# Patient Record
Sex: FEMALE | Race: WHITE | NOT HISPANIC OR LATINO
[De-identification: names, ages, dates, MRNs, and addresses within clinical notes are randomized per-mention and may not be internally consistent; named-entity substitution may affect disease eponyms.]

---

## 2018-08-03 ENCOUNTER — APPOINTMENT (OUTPATIENT)
Dept: ORTHOPEDIC SURGERY | Facility: CLINIC | Age: 83
End: 2018-08-03
Payer: MEDICARE

## 2018-08-03 VITALS — HEIGHT: 65 IN | RESPIRATION RATE: 14 BRPM | BODY MASS INDEX: 30.49 KG/M2 | WEIGHT: 183 LBS

## 2018-08-03 DIAGNOSIS — G56.01 CARPAL TUNNEL SYNDROME, RIGHT UPPER LIMB: ICD-10-CM

## 2018-08-03 PROCEDURE — 20605 DRAIN/INJ JOINT/BURSA W/O US: CPT | Mod: RT

## 2018-08-03 PROCEDURE — 99213 OFFICE O/P EST LOW 20 MIN: CPT | Mod: 25

## 2018-12-03 ENCOUNTER — APPOINTMENT (OUTPATIENT)
Dept: ORTHOPEDIC SURGERY | Facility: CLINIC | Age: 83
End: 2018-12-03
Payer: MEDICARE

## 2018-12-03 PROCEDURE — 73564 X-RAY EXAM KNEE 4 OR MORE: CPT | Mod: RT

## 2018-12-03 PROCEDURE — 20610 DRAIN/INJ JOINT/BURSA W/O US: CPT | Mod: RT

## 2018-12-03 PROCEDURE — 99214 OFFICE O/P EST MOD 30 MIN: CPT | Mod: 25

## 2019-02-19 ENCOUNTER — APPOINTMENT (OUTPATIENT)
Dept: ORTHOPEDIC SURGERY | Facility: CLINIC | Age: 84
End: 2019-02-19
Payer: MEDICARE

## 2019-02-19 VITALS — DIASTOLIC BLOOD PRESSURE: 75 MMHG | SYSTOLIC BLOOD PRESSURE: 181 MMHG | HEART RATE: 62 BPM

## 2019-02-19 VITALS — WEIGHT: 183 LBS | BODY MASS INDEX: 29.41 KG/M2 | HEIGHT: 66 IN

## 2019-02-19 DIAGNOSIS — M17.11 UNILATERAL PRIMARY OSTEOARTHRITIS, RIGHT KNEE: ICD-10-CM

## 2019-02-19 PROCEDURE — 20610 DRAIN/INJ JOINT/BURSA W/O US: CPT | Mod: RT

## 2019-02-19 PROCEDURE — 99214 OFFICE O/P EST MOD 30 MIN: CPT | Mod: 25

## 2019-02-19 RX ORDER — SIMVASTATIN 20 MG/1
20 TABLET, FILM COATED ORAL
Refills: 0 | Status: ACTIVE | COMMUNITY

## 2019-02-19 RX ORDER — OXYBUTYNIN CHLORIDE 5 MG/1
5 TABLET ORAL
Qty: 90 | Refills: 0 | Status: ACTIVE | COMMUNITY
Start: 2019-01-29

## 2019-02-19 RX ORDER — HYALURONATE SODIUM, STABILIZED 88 MG/4 ML
88 SYRINGE (ML) INTRAARTICULAR
Qty: 1 | Refills: 0 | Status: ACTIVE | OUTPATIENT
Start: 2019-02-19

## 2019-02-19 RX ORDER — TOLTERODINE TARTRATE 4 MG/1
4 CAPSULE, EXTENDED RELEASE ORAL
Qty: 30 | Refills: 0 | Status: ACTIVE | COMMUNITY
Start: 2018-08-28

## 2019-02-19 RX ORDER — APIXABAN 5 MG/1
5 TABLET, FILM COATED ORAL
Qty: 180 | Refills: 0 | Status: ACTIVE | COMMUNITY
Start: 2019-01-04

## 2019-02-19 RX ORDER — LOSARTAN POTASSIUM 100 MG/1
100 TABLET, FILM COATED ORAL
Qty: 90 | Refills: 0 | Status: ACTIVE | COMMUNITY
Start: 2019-01-28

## 2019-02-19 RX ORDER — AMLODIPINE BESYLATE 5 MG/1
5 TABLET ORAL
Qty: 90 | Refills: 0 | Status: ACTIVE | COMMUNITY
Start: 2019-01-26

## 2019-02-19 RX ORDER — PANTOPRAZOLE SODIUM 40 MG/10ML
INJECTION, POWDER, FOR SOLUTION INTRAVENOUS
Refills: 0 | Status: ACTIVE | COMMUNITY

## 2019-02-20 PROBLEM — M17.11 PRIMARY OSTEOARTHRITIS OF RIGHT KNEE: Status: ACTIVE | Noted: 2018-12-03

## 2019-06-21 ENCOUNTER — TRANSCRIPTION ENCOUNTER (OUTPATIENT)
Age: 84
End: 2019-06-21

## 2019-06-21 ENCOUNTER — INPATIENT (INPATIENT)
Facility: HOSPITAL | Age: 84
LOS: 21 days | Discharge: NURSING, REHAB OR LONG-TERM CARE | DRG: 469 | End: 2019-07-13
Attending: INTERNAL MEDICINE | Admitting: SPECIALIST
Payer: MEDICARE

## 2019-06-21 VITALS
RESPIRATION RATE: 16 BRPM | HEART RATE: 84 BPM | HEIGHT: 65 IN | OXYGEN SATURATION: 97 % | WEIGHT: 182.98 LBS | TEMPERATURE: 97 F | DIASTOLIC BLOOD PRESSURE: 74 MMHG | SYSTOLIC BLOOD PRESSURE: 149 MMHG

## 2019-06-21 DIAGNOSIS — Z90.49 ACQUIRED ABSENCE OF OTHER SPECIFIED PARTS OF DIGESTIVE TRACT: Chronic | ICD-10-CM

## 2019-06-21 LAB
ALBUMIN SERPL ELPH-MCNC: 4.3 G/DL — SIGNIFICANT CHANGE UP (ref 3.3–5)
ALP SERPL-CCNC: 150 U/L — HIGH (ref 40–120)
ALT FLD-CCNC: 15 U/L — SIGNIFICANT CHANGE UP (ref 10–45)
ANION GAP SERPL CALC-SCNC: 14 MMOL/L — SIGNIFICANT CHANGE UP (ref 5–17)
APTT BLD: 33.8 SEC — SIGNIFICANT CHANGE UP (ref 27.5–36.3)
AST SERPL-CCNC: 22 U/L — SIGNIFICANT CHANGE UP (ref 10–40)
BASOPHILS # BLD AUTO: 0.1 K/UL — SIGNIFICANT CHANGE UP (ref 0–0.2)
BASOPHILS NFR BLD AUTO: 1.1 % — SIGNIFICANT CHANGE UP (ref 0–2)
BILIRUB SERPL-MCNC: 0.3 MG/DL — SIGNIFICANT CHANGE UP (ref 0.2–1.2)
BLD GP AB SCN SERPL QL: NEGATIVE — SIGNIFICANT CHANGE UP
BUN SERPL-MCNC: 27 MG/DL — HIGH (ref 7–23)
CALCIUM SERPL-MCNC: 10 MG/DL — SIGNIFICANT CHANGE UP (ref 8.4–10.5)
CHLORIDE SERPL-SCNC: 102 MMOL/L — SIGNIFICANT CHANGE UP (ref 96–108)
CO2 SERPL-SCNC: 24 MMOL/L — SIGNIFICANT CHANGE UP (ref 22–31)
CREAT SERPL-MCNC: 1.46 MG/DL — HIGH (ref 0.5–1.3)
EOSINOPHIL # BLD AUTO: 0.2 K/UL — SIGNIFICANT CHANGE UP (ref 0–0.5)
EOSINOPHIL NFR BLD AUTO: 2.1 % — SIGNIFICANT CHANGE UP (ref 0–6)
GLUCOSE SERPL-MCNC: 128 MG/DL — HIGH (ref 70–99)
HCT VFR BLD CALC: 42.7 % — SIGNIFICANT CHANGE UP (ref 34.5–45)
HGB BLD-MCNC: 14.1 G/DL — SIGNIFICANT CHANGE UP (ref 11.5–15.5)
INR BLD: 1.08 RATIO — SIGNIFICANT CHANGE UP (ref 0.88–1.16)
LYMPHOCYTES # BLD AUTO: 1.9 K/UL — SIGNIFICANT CHANGE UP (ref 1–3.3)
LYMPHOCYTES # BLD AUTO: 24.1 % — SIGNIFICANT CHANGE UP (ref 13–44)
MCHC RBC-ENTMCNC: 30.8 PG — SIGNIFICANT CHANGE UP (ref 27–34)
MCHC RBC-ENTMCNC: 33 GM/DL — SIGNIFICANT CHANGE UP (ref 32–36)
MCV RBC AUTO: 93.3 FL — SIGNIFICANT CHANGE UP (ref 80–100)
MONOCYTES # BLD AUTO: 0.5 K/UL — SIGNIFICANT CHANGE UP (ref 0–0.9)
MONOCYTES NFR BLD AUTO: 6.9 % — SIGNIFICANT CHANGE UP (ref 2–14)
NEUTROPHILS # BLD AUTO: 5.2 K/UL — SIGNIFICANT CHANGE UP (ref 1.8–7.4)
NEUTROPHILS NFR BLD AUTO: 65.8 % — SIGNIFICANT CHANGE UP (ref 43–77)
PLATELET # BLD AUTO: 236 K/UL — SIGNIFICANT CHANGE UP (ref 150–400)
POTASSIUM SERPL-MCNC: 4.4 MMOL/L — SIGNIFICANT CHANGE UP (ref 3.5–5.3)
POTASSIUM SERPL-SCNC: 4.4 MMOL/L — SIGNIFICANT CHANGE UP (ref 3.5–5.3)
PROT SERPL-MCNC: 7.6 G/DL — SIGNIFICANT CHANGE UP (ref 6–8.3)
PROTHROM AB SERPL-ACNC: 12.3 SEC — SIGNIFICANT CHANGE UP (ref 10–12.9)
RBC # BLD: 4.57 M/UL — SIGNIFICANT CHANGE UP (ref 3.8–5.2)
RBC # FLD: 11.8 % — SIGNIFICANT CHANGE UP (ref 10.3–14.5)
RH IG SCN BLD-IMP: POSITIVE — SIGNIFICANT CHANGE UP
SODIUM SERPL-SCNC: 140 MMOL/L — SIGNIFICANT CHANGE UP (ref 135–145)
WBC # BLD: 7.9 K/UL — SIGNIFICANT CHANGE UP (ref 3.8–10.5)
WBC # FLD AUTO: 7.9 K/UL — SIGNIFICANT CHANGE UP (ref 3.8–10.5)

## 2019-06-21 PROCEDURE — 71045 X-RAY EXAM CHEST 1 VIEW: CPT | Mod: 26

## 2019-06-21 PROCEDURE — 73564 X-RAY EXAM KNEE 4 OR MORE: CPT | Mod: 26,RT

## 2019-06-21 PROCEDURE — 73503 X-RAY EXAM HIP UNI 4/> VIEWS: CPT | Mod: 26,RT

## 2019-06-21 PROCEDURE — 73552 X-RAY EXAM OF FEMUR 2/>: CPT | Mod: 26,RT

## 2019-06-21 PROCEDURE — 93010 ELECTROCARDIOGRAM REPORT: CPT

## 2019-06-21 PROCEDURE — 99285 EMERGENCY DEPT VISIT HI MDM: CPT | Mod: GC

## 2019-06-21 PROCEDURE — 73590 X-RAY EXAM OF LOWER LEG: CPT | Mod: 26,RT

## 2019-06-21 RX ORDER — MORPHINE SULFATE 50 MG/1
2 CAPSULE, EXTENDED RELEASE ORAL ONCE
Refills: 0 | Status: DISCONTINUED | OUTPATIENT
Start: 2019-06-21 | End: 2019-06-21

## 2019-06-21 RX ORDER — ONDANSETRON 8 MG/1
4 TABLET, FILM COATED ORAL ONCE
Refills: 0 | Status: COMPLETED | OUTPATIENT
Start: 2019-06-21 | End: 2019-06-21

## 2019-06-21 RX ADMIN — MORPHINE SULFATE 2 MILLIGRAM(S): 50 CAPSULE, EXTENDED RELEASE ORAL at 20:43

## 2019-06-21 RX ADMIN — MORPHINE SULFATE 2 MILLIGRAM(S): 50 CAPSULE, EXTENDED RELEASE ORAL at 23:04

## 2019-06-21 RX ADMIN — MORPHINE SULFATE 2 MILLIGRAM(S): 50 CAPSULE, EXTENDED RELEASE ORAL at 21:35

## 2019-06-21 NOTE — ED PROVIDER NOTE - NS ED ROS FT
GENERAL: No fever or chills  EYES: no change in vision  HEENT: no trouble swallowing or speaking  CARDIAC: no chest pain or palpitations   PULMONARY: no cough or SOB  GI: no abdominal pain, nausea, vomiting, diarrhea, or constipation   : No changes in urination  SKIN: no rashes  NEURO: no headache, numbness, or weakness  MSK: R hip/knee pain    ~Celso Huston PGY1

## 2019-06-21 NOTE — ED PROVIDER NOTE - PHYSICAL EXAMINATION
Gen: AAOx3, non-toxic  Head: NCAT  HEENT: EOMI, oral mucosa moist, normal conjunctiva  Lung: CTAB, no respiratory distress, no wheezes/rhonchi/rales B/L, speaking in full sentences  CV: +DP and PT pulses palpated bilaterally. RRR, no murmurs, rubs or gallops  Abd: soft, NTND, no guarding, no CVA tenderness  MSK: R leg shortened and externally rotated. TTP over R hip and R knee. no bony TTP anywhere else  Neuro: No focal sensory or motor deficits, normal CN exam   Skin: Warm, well perfused, no rash/bruising/laceration. R leg/foot warm and well perfused.   Psych: normal affect.     ~Celso Huston PGY1

## 2019-06-21 NOTE — ED ADULT NURSE NOTE - OBJECTIVE STATEMENT
brought in by [facility] from home s/p fall at at home from standing position; denies loc or headstrike;  has right hip pain brought in by [facility] from home s/p fall at at home from standing position; denies loc or headstrike;  on eliquis for afib; has right hip pain; hx polio; pt is alert and oriented x 4; skin warm and dry; right foot externally rotated; daughter at bedside

## 2019-06-21 NOTE — ED PROVIDER NOTE - OBJECTIVE STATEMENT
89F with PMH of HTN, HLD, and Afib on Eliquis p/w R hip and R knee pain s/p mechanical fall. States she was turning around in her kitchen when she accidentally tripped and fell onto her R side. Denies head trauma or LOC. Was able to call for help and get transported to the ED immediately. Now reporting pain to R hip and knee. Denies pain anywhere else. Bean any other symptoms including HA, visual changes, numbness, weakness, chest pain, SOB, abdominal pain, N/V/D.

## 2019-06-21 NOTE — ED PROVIDER NOTE - CLINICAL SUMMARY MEDICAL DECISION MAKING FREE TEXT BOX
R hip/knee pain s/p mechanical fall. No head trauma or LOC. Exam finding c/w hip fracture. Neurovascularly intact. Pt well appearing, HD stable. X-rays, pain control, and reassess. Patient presenting with R hip/knee pain s/p mechanical fall. No head trauma or LOC. Exam finding c/w hip fracture. Neurovascularly intact. Pt well appearing, HD stable. X-rays, pain control, and reassess.  ATTG: Dr. Bai

## 2019-06-22 ENCOUNTER — RESULT REVIEW (OUTPATIENT)
Age: 84
End: 2019-06-22

## 2019-06-22 DIAGNOSIS — S72.009A FRACTURE OF UNSPECIFIED PART OF NECK OF UNSPECIFIED FEMUR, INITIAL ENCOUNTER FOR CLOSED FRACTURE: ICD-10-CM

## 2019-06-22 LAB
ALBUMIN SERPL ELPH-MCNC: 3.9 G/DL — SIGNIFICANT CHANGE UP (ref 3.3–5)
ANION GAP SERPL CALC-SCNC: 11 MMOL/L — SIGNIFICANT CHANGE UP (ref 5–17)
ANION GAP SERPL CALC-SCNC: 15 MMOL/L — SIGNIFICANT CHANGE UP (ref 5–17)
APPEARANCE UR: CLEAR — SIGNIFICANT CHANGE UP
APTT BLD: 33.9 SEC — SIGNIFICANT CHANGE UP (ref 27.5–36.3)
BASOPHILS # BLD AUTO: 0 K/UL — SIGNIFICANT CHANGE UP (ref 0–0.2)
BASOPHILS NFR BLD AUTO: 0.3 % — SIGNIFICANT CHANGE UP (ref 0–2)
BILIRUB UR-MCNC: NEGATIVE — SIGNIFICANT CHANGE UP
BLD GP AB SCN SERPL QL: NEGATIVE — SIGNIFICANT CHANGE UP
BUN SERPL-MCNC: 24 MG/DL — HIGH (ref 7–23)
BUN SERPL-MCNC: 27 MG/DL — HIGH (ref 7–23)
CALCIUM SERPL-MCNC: 9.2 MG/DL — SIGNIFICANT CHANGE UP (ref 8.4–10.5)
CALCIUM SERPL-MCNC: 9.4 MG/DL — SIGNIFICANT CHANGE UP (ref 8.4–10.5)
CHLORIDE SERPL-SCNC: 103 MMOL/L — SIGNIFICANT CHANGE UP (ref 96–108)
CHLORIDE SERPL-SCNC: 107 MMOL/L — SIGNIFICANT CHANGE UP (ref 96–108)
CO2 SERPL-SCNC: 21 MMOL/L — LOW (ref 22–31)
CO2 SERPL-SCNC: 25 MMOL/L — SIGNIFICANT CHANGE UP (ref 22–31)
COLOR SPEC: YELLOW — SIGNIFICANT CHANGE UP
CREAT SERPL-MCNC: 1.41 MG/DL — HIGH (ref 0.5–1.3)
CREAT SERPL-MCNC: 1.48 MG/DL — HIGH (ref 0.5–1.3)
DIFF PNL FLD: NEGATIVE — SIGNIFICANT CHANGE UP
EOSINOPHIL # BLD AUTO: 0.1 K/UL — SIGNIFICANT CHANGE UP (ref 0–0.5)
EOSINOPHIL NFR BLD AUTO: 0.5 % — SIGNIFICANT CHANGE UP (ref 0–6)
GLUCOSE SERPL-MCNC: 134 MG/DL — HIGH (ref 70–99)
GLUCOSE SERPL-MCNC: 185 MG/DL — HIGH (ref 70–99)
GLUCOSE UR QL: NEGATIVE — SIGNIFICANT CHANGE UP
HCT VFR BLD CALC: 37.6 % — SIGNIFICANT CHANGE UP (ref 34.5–45)
HCT VFR BLD CALC: 37.8 % — SIGNIFICANT CHANGE UP (ref 34.5–45)
HGB BLD-MCNC: 12.8 G/DL — SIGNIFICANT CHANGE UP (ref 11.5–15.5)
HGB BLD-MCNC: 13 G/DL — SIGNIFICANT CHANGE UP (ref 11.5–15.5)
INR BLD: 1.14 RATIO — SIGNIFICANT CHANGE UP (ref 0.88–1.16)
KETONES UR-MCNC: NEGATIVE — SIGNIFICANT CHANGE UP
LEUKOCYTE ESTERASE UR-ACNC: ABNORMAL
LYMPHOCYTES # BLD AUTO: 1.1 K/UL — SIGNIFICANT CHANGE UP (ref 1–3.3)
LYMPHOCYTES # BLD AUTO: 9 % — LOW (ref 13–44)
MCHC RBC-ENTMCNC: 31.6 PG — SIGNIFICANT CHANGE UP (ref 27–34)
MCHC RBC-ENTMCNC: 32.5 PG — SIGNIFICANT CHANGE UP (ref 27–34)
MCHC RBC-ENTMCNC: 33.7 GM/DL — SIGNIFICANT CHANGE UP (ref 32–36)
MCHC RBC-ENTMCNC: 34.5 GM/DL — SIGNIFICANT CHANGE UP (ref 32–36)
MCV RBC AUTO: 93.7 FL — SIGNIFICANT CHANGE UP (ref 80–100)
MCV RBC AUTO: 94.1 FL — SIGNIFICANT CHANGE UP (ref 80–100)
MONOCYTES # BLD AUTO: 0.2 K/UL — SIGNIFICANT CHANGE UP (ref 0–0.9)
MONOCYTES NFR BLD AUTO: 1.7 % — LOW (ref 2–14)
NEUTROPHILS # BLD AUTO: 11.1 K/UL — HIGH (ref 1.8–7.4)
NEUTROPHILS NFR BLD AUTO: 88.5 % — HIGH (ref 43–77)
NITRITE UR-MCNC: NEGATIVE — SIGNIFICANT CHANGE UP
PH UR: 5.5 — SIGNIFICANT CHANGE UP (ref 5–8)
PLATELET # BLD AUTO: 202 K/UL — SIGNIFICANT CHANGE UP (ref 150–400)
PLATELET # BLD AUTO: 212 K/UL — SIGNIFICANT CHANGE UP (ref 150–400)
POTASSIUM SERPL-MCNC: 4.5 MMOL/L — SIGNIFICANT CHANGE UP (ref 3.5–5.3)
POTASSIUM SERPL-MCNC: 5 MMOL/L — SIGNIFICANT CHANGE UP (ref 3.5–5.3)
POTASSIUM SERPL-SCNC: 4.5 MMOL/L — SIGNIFICANT CHANGE UP (ref 3.5–5.3)
POTASSIUM SERPL-SCNC: 5 MMOL/L — SIGNIFICANT CHANGE UP (ref 3.5–5.3)
PROT UR-MCNC: SIGNIFICANT CHANGE UP
PROTHROM AB SERPL-ACNC: 13 SEC — HIGH (ref 10–12.9)
RBC # BLD: 3.99 M/UL — SIGNIFICANT CHANGE UP (ref 3.8–5.2)
RBC # BLD: 4.04 M/UL — SIGNIFICANT CHANGE UP (ref 3.8–5.2)
RBC # FLD: 11.7 % — SIGNIFICANT CHANGE UP (ref 10.3–14.5)
RBC # FLD: 11.8 % — SIGNIFICANT CHANGE UP (ref 10.3–14.5)
RH IG SCN BLD-IMP: POSITIVE — SIGNIFICANT CHANGE UP
SODIUM SERPL-SCNC: 139 MMOL/L — SIGNIFICANT CHANGE UP (ref 135–145)
SODIUM SERPL-SCNC: 143 MMOL/L — SIGNIFICANT CHANGE UP (ref 135–145)
SP GR SPEC: 1.02 — SIGNIFICANT CHANGE UP (ref 1.01–1.02)
UROBILINOGEN FLD QL: SIGNIFICANT CHANGE UP
WBC # BLD: 12.6 K/UL — HIGH (ref 3.8–10.5)
WBC # BLD: 9.6 K/UL — SIGNIFICANT CHANGE UP (ref 3.8–10.5)
WBC # FLD AUTO: 12.6 K/UL — HIGH (ref 3.8–10.5)
WBC # FLD AUTO: 9.6 K/UL — SIGNIFICANT CHANGE UP (ref 3.8–10.5)

## 2019-06-22 PROCEDURE — 73560 X-RAY EXAM OF KNEE 1 OR 2: CPT | Mod: 26,RT

## 2019-06-22 PROCEDURE — 72192 CT PELVIS W/O DYE: CPT | Mod: 26

## 2019-06-22 PROCEDURE — 27236 TREAT THIGH FRACTURE: CPT | Mod: RT

## 2019-06-22 PROCEDURE — 76377 3D RENDER W/INTRP POSTPROCES: CPT | Mod: 26

## 2019-06-22 PROCEDURE — 72170 X-RAY EXAM OF PELVIS: CPT | Mod: 26

## 2019-06-22 PROCEDURE — 88305 TISSUE EXAM BY PATHOLOGIST: CPT | Mod: 26

## 2019-06-22 PROCEDURE — 88311 DECALCIFY TISSUE: CPT | Mod: 26

## 2019-06-22 RX ORDER — SODIUM CHLORIDE 9 MG/ML
1000 INJECTION INTRAMUSCULAR; INTRAVENOUS; SUBCUTANEOUS
Refills: 0 | Status: DISCONTINUED | OUTPATIENT
Start: 2019-06-22 | End: 2019-06-22

## 2019-06-22 RX ORDER — SENNA PLUS 8.6 MG/1
2 TABLET ORAL AT BEDTIME
Refills: 0 | Status: DISCONTINUED | OUTPATIENT
Start: 2019-06-22 | End: 2019-07-13

## 2019-06-22 RX ORDER — MORPHINE SULFATE 50 MG/1
4 CAPSULE, EXTENDED RELEASE ORAL ONCE
Refills: 0 | Status: DISCONTINUED | OUTPATIENT
Start: 2019-06-22 | End: 2019-06-22

## 2019-06-22 RX ORDER — CEFAZOLIN SODIUM 1 G
2000 VIAL (EA) INJECTION EVERY 8 HOURS
Refills: 0 | Status: COMPLETED | OUTPATIENT
Start: 2019-06-23 | End: 2019-06-23

## 2019-06-22 RX ORDER — MORPHINE SULFATE 50 MG/1
2 CAPSULE, EXTENDED RELEASE ORAL EVERY 4 HOURS
Refills: 0 | Status: DISCONTINUED | OUTPATIENT
Start: 2019-06-22 | End: 2019-06-23

## 2019-06-22 RX ORDER — OXYCODONE HYDROCHLORIDE 5 MG/1
2.5 TABLET ORAL EVERY 4 HOURS
Refills: 0 | Status: DISCONTINUED | OUTPATIENT
Start: 2019-06-22 | End: 2019-06-23

## 2019-06-22 RX ORDER — HYDROMORPHONE HYDROCHLORIDE 2 MG/ML
0.2 INJECTION INTRAMUSCULAR; INTRAVENOUS; SUBCUTANEOUS
Refills: 0 | Status: DISCONTINUED | OUTPATIENT
Start: 2019-06-22 | End: 2019-06-22

## 2019-06-22 RX ORDER — MORPHINE SULFATE 50 MG/1
1 CAPSULE, EXTENDED RELEASE ORAL
Refills: 0 | Status: DISCONTINUED | OUTPATIENT
Start: 2019-06-22 | End: 2019-06-23

## 2019-06-22 RX ORDER — TRAMADOL HYDROCHLORIDE 50 MG/1
25 TABLET ORAL EVERY 6 HOURS
Refills: 0 | Status: DISCONTINUED | OUTPATIENT
Start: 2019-06-22 | End: 2019-06-22

## 2019-06-22 RX ORDER — SENNA PLUS 8.6 MG/1
2 TABLET ORAL AT BEDTIME
Refills: 0 | Status: DISCONTINUED | OUTPATIENT
Start: 2019-06-22 | End: 2019-06-22

## 2019-06-22 RX ORDER — MIRTAZAPINE 45 MG/1
15 TABLET, ORALLY DISINTEGRATING ORAL AT BEDTIME
Refills: 0 | Status: DISCONTINUED | OUTPATIENT
Start: 2019-06-22 | End: 2019-06-26

## 2019-06-22 RX ORDER — SIMVASTATIN 20 MG/1
20 TABLET, FILM COATED ORAL AT BEDTIME
Refills: 0 | Status: DISCONTINUED | OUTPATIENT
Start: 2019-06-22 | End: 2019-07-13

## 2019-06-22 RX ORDER — LOSARTAN POTASSIUM 100 MG/1
100 TABLET, FILM COATED ORAL DAILY
Refills: 0 | Status: DISCONTINUED | OUTPATIENT
Start: 2019-06-22 | End: 2019-06-24

## 2019-06-22 RX ORDER — ONDANSETRON 8 MG/1
4 TABLET, FILM COATED ORAL ONCE
Refills: 0 | Status: DISCONTINUED | OUTPATIENT
Start: 2019-06-22 | End: 2019-06-22

## 2019-06-22 RX ORDER — PANTOPRAZOLE SODIUM 20 MG/1
40 TABLET, DELAYED RELEASE ORAL
Refills: 0 | Status: DISCONTINUED | OUTPATIENT
Start: 2019-06-22 | End: 2019-06-22

## 2019-06-22 RX ORDER — OXYCODONE HYDROCHLORIDE 5 MG/1
2.5 TABLET ORAL EVERY 4 HOURS
Refills: 0 | Status: DISCONTINUED | OUTPATIENT
Start: 2019-06-22 | End: 2019-06-22

## 2019-06-22 RX ORDER — ONDANSETRON 8 MG/1
4 TABLET, FILM COATED ORAL EVERY 6 HOURS
Refills: 0 | Status: DISCONTINUED | OUTPATIENT
Start: 2019-06-22 | End: 2019-07-13

## 2019-06-22 RX ORDER — LOSARTAN POTASSIUM 100 MG/1
100 TABLET, FILM COATED ORAL DAILY
Refills: 0 | Status: DISCONTINUED | OUTPATIENT
Start: 2019-06-22 | End: 2019-06-22

## 2019-06-22 RX ORDER — MORPHINE SULFATE 50 MG/1
2 CAPSULE, EXTENDED RELEASE ORAL ONCE
Refills: 0 | Status: DISCONTINUED | OUTPATIENT
Start: 2019-06-22 | End: 2019-06-22

## 2019-06-22 RX ORDER — AMLODIPINE BESYLATE 2.5 MG/1
5 TABLET ORAL DAILY
Refills: 0 | Status: DISCONTINUED | OUTPATIENT
Start: 2019-06-22 | End: 2019-06-24

## 2019-06-22 RX ORDER — DOCUSATE SODIUM 100 MG
100 CAPSULE ORAL THREE TIMES A DAY
Refills: 0 | Status: DISCONTINUED | OUTPATIENT
Start: 2019-06-22 | End: 2019-07-13

## 2019-06-22 RX ORDER — PANTOPRAZOLE SODIUM 20 MG/1
40 TABLET, DELAYED RELEASE ORAL
Refills: 0 | Status: DISCONTINUED | OUTPATIENT
Start: 2019-06-22 | End: 2019-07-05

## 2019-06-22 RX ORDER — APIXABAN 2.5 MG/1
2.5 TABLET, FILM COATED ORAL EVERY 12 HOURS
Refills: 0 | Status: DISCONTINUED | OUTPATIENT
Start: 2019-06-23 | End: 2019-06-24

## 2019-06-22 RX ORDER — SIMVASTATIN 20 MG/1
20 TABLET, FILM COATED ORAL AT BEDTIME
Refills: 0 | Status: DISCONTINUED | OUTPATIENT
Start: 2019-06-22 | End: 2019-06-22

## 2019-06-22 RX ORDER — MIRTAZAPINE 45 MG/1
15 TABLET, ORALLY DISINTEGRATING ORAL AT BEDTIME
Refills: 0 | Status: DISCONTINUED | OUTPATIENT
Start: 2019-06-22 | End: 2019-06-22

## 2019-06-22 RX ORDER — OXYCODONE HYDROCHLORIDE 5 MG/1
5 TABLET ORAL EVERY 4 HOURS
Refills: 0 | Status: DISCONTINUED | OUTPATIENT
Start: 2019-06-22 | End: 2019-06-23

## 2019-06-22 RX ORDER — POLYETHYLENE GLYCOL 3350 17 G/17G
17 POWDER, FOR SOLUTION ORAL DAILY
Refills: 0 | Status: DISCONTINUED | OUTPATIENT
Start: 2019-06-22 | End: 2019-07-13

## 2019-06-22 RX ORDER — AMLODIPINE BESYLATE 2.5 MG/1
5 TABLET ORAL DAILY
Refills: 0 | Status: DISCONTINUED | OUTPATIENT
Start: 2019-06-22 | End: 2019-06-22

## 2019-06-22 RX ORDER — MAGNESIUM HYDROXIDE 400 MG/1
30 TABLET, CHEWABLE ORAL DAILY
Refills: 0 | Status: DISCONTINUED | OUTPATIENT
Start: 2019-06-22 | End: 2019-07-13

## 2019-06-22 RX ORDER — ACETAMINOPHEN 500 MG
975 TABLET ORAL EVERY 8 HOURS
Refills: 0 | Status: DISCONTINUED | OUTPATIENT
Start: 2019-06-22 | End: 2019-07-13

## 2019-06-22 RX ORDER — SODIUM CHLORIDE 9 MG/ML
1000 INJECTION INTRAMUSCULAR; INTRAVENOUS; SUBCUTANEOUS
Refills: 0 | Status: DISCONTINUED | OUTPATIENT
Start: 2019-06-22 | End: 2019-06-23

## 2019-06-22 RX ORDER — ACETAMINOPHEN 500 MG
975 TABLET ORAL EVERY 8 HOURS
Refills: 0 | Status: DISCONTINUED | OUTPATIENT
Start: 2019-06-22 | End: 2019-06-22

## 2019-06-22 RX ORDER — OXYCODONE HYDROCHLORIDE 5 MG/1
5 TABLET ORAL EVERY 4 HOURS
Refills: 0 | Status: DISCONTINUED | OUTPATIENT
Start: 2019-06-22 | End: 2019-06-22

## 2019-06-22 RX ORDER — DOCUSATE SODIUM 100 MG
100 CAPSULE ORAL THREE TIMES A DAY
Refills: 0 | Status: DISCONTINUED | OUTPATIENT
Start: 2019-06-22 | End: 2019-06-22

## 2019-06-22 RX ADMIN — SODIUM CHLORIDE 125 MILLILITER(S): 9 INJECTION INTRAMUSCULAR; INTRAVENOUS; SUBCUTANEOUS at 08:15

## 2019-06-22 RX ADMIN — PANTOPRAZOLE SODIUM 40 MILLIGRAM(S): 20 TABLET, DELAYED RELEASE ORAL at 06:48

## 2019-06-22 RX ADMIN — MORPHINE SULFATE 2 MILLIGRAM(S): 50 CAPSULE, EXTENDED RELEASE ORAL at 01:49

## 2019-06-22 RX ADMIN — MORPHINE SULFATE 2 MILLIGRAM(S): 50 CAPSULE, EXTENDED RELEASE ORAL at 03:30

## 2019-06-22 RX ADMIN — Medication 975 MILLIGRAM(S): at 13:46

## 2019-06-22 RX ADMIN — Medication 975 MILLIGRAM(S): at 07:20

## 2019-06-22 RX ADMIN — ONDANSETRON 4 MILLIGRAM(S): 8 TABLET, FILM COATED ORAL at 00:02

## 2019-06-22 RX ADMIN — OXYCODONE HYDROCHLORIDE 5 MILLIGRAM(S): 5 TABLET ORAL at 06:48

## 2019-06-22 RX ADMIN — Medication 975 MILLIGRAM(S): at 06:48

## 2019-06-22 RX ADMIN — SODIUM CHLORIDE 125 MILLILITER(S): 9 INJECTION INTRAMUSCULAR; INTRAVENOUS; SUBCUTANEOUS at 22:23

## 2019-06-22 RX ADMIN — LOSARTAN POTASSIUM 100 MILLIGRAM(S): 100 TABLET, FILM COATED ORAL at 06:49

## 2019-06-22 RX ADMIN — MORPHINE SULFATE 4 MILLIGRAM(S): 50 CAPSULE, EXTENDED RELEASE ORAL at 03:35

## 2019-06-22 RX ADMIN — AMLODIPINE BESYLATE 5 MILLIGRAM(S): 2.5 TABLET ORAL at 11:12

## 2019-06-22 RX ADMIN — Medication 975 MILLIGRAM(S): at 12:46

## 2019-06-22 RX ADMIN — OXYCODONE HYDROCHLORIDE 5 MILLIGRAM(S): 5 TABLET ORAL at 07:20

## 2019-06-22 NOTE — H&P ADULT - NSICDXPASTMEDICALHX_GEN_ALL_CORE_FT
PAST MEDICAL HISTORY:  High cholesterol     Hypertension     Pleural effusion on left     Polio     Splenic abscess

## 2019-06-22 NOTE — H&P ADULT - HISTORY OF PRESENT ILLNESS
89y Female presents to [facility] ED s/p mech fall in the kitchen onto R hip when right leg gave way, now c/o severe right hip pain and inability to ambulate.  Patient denies headstrike or LOC. Localizes pain to right hip/femur/knee. Patient denies radiation of pain. Patient denies numbness/tingling/burning in the RLE. No other bone/joint complaints. Patient is a community ambulator at baseline with a rolling walker. Patient has no issues w/ ADLs/IADLs.               Imaging:  XR demonstrating _ femoral neck/intertroch/subtroch fracture 89y Female presents to [facility] ED s/p mech fall in the kitchen onto R hip when right leg gave way, now c/o severe right hip pain and inability to ambulate.  Patient denies headstrike or LOC. Localizes pain to right hip/femur/knee. Patient denies radiation of pain. Patient denies numbness/tingling/burning in the RLE. No other bone/joint complaints. Patient is a community ambulator at baseline with a rolling walker. Patient has no issues w/ ADLs/IADLs.

## 2019-06-22 NOTE — H&P ADULT - ASSESSMENT
89y Female with right femoral neck fracture    - admit to orthopedic surgery service under Dr. Muhammad  - Pain control  - NPO/IVF  - CBC/BMP/Coags/UA/T+S x2  - EKG/CXR  - Medical clearance  - Plan for OR for right hip hemiarthroplasty

## 2019-06-22 NOTE — H&P ADULT - NSHPLABSRESULTS_GEN_ALL_CORE
Labs:                          14.1   7.9   )-----------( 236      ( 21 Jun 2019 20:53 )             42.7   06-21    140  |  102  |  27<H>  ----------------------------<  128<H>  4.4   |  24  |  1.46<H>    Ca    10.0      21 Jun 2019 20:53    TPro  7.6  /  Alb  4.3  /  TBili  0.3  /  DBili  x   /  AST  22  /  ALT  15  /  AlkPhos  150<H>  06-21  PT/INR - ( 21 Jun 2019 20:53 )   PT: 12.3 sec;   INR: 1.08 ratio         PTT - ( 21 Jun 2019 20:53 )  PTT:33.8 sec    Imaging:    < from: CT Pelvis Bony Only No Cont (06.22.19 @ 01:15) >    INTERPRETATION:  Acute right subcapital femoral fracture with anterior   displacement of the femoral neck with respect to the femoral head.    Discussed with Dr. Falcon at 2:19 AM      ******PRELIMINARY REPORT******    ******PRELIMINARY REPORT******          < end of copied text >      < from: CT 3D Reconstruct w/ Workstation (06.22.19 @ 01:15) >      INTERPRETATION:  Acute right subcapital femoral fracture with anterior   displacement of the femoral neck with respect to the femoral head.    Discussed with Dr. Falcon at 2:19 AM    < end of copied text >    Preliminary report

## 2019-06-22 NOTE — PRE-ANESTHESIA EVALUATION ADULT - NSANTHOSAYNRD_GEN_A_CORE
No. SANYA screening performed.  STOP BANG Legend: 0-2 = LOW Risk; 3-4 = INTERMEDIATE Risk; 5-8 = HIGH Risk

## 2019-06-22 NOTE — PRE-ANESTHESIA EVALUATION ADULT - NSANTHPMHFT_GEN_ALL_CORE
medical clearance: accidental fall with a right subcapital femoral neck fracture that requires orthopedic ORIF. Her comorbidities include paroxysmal atrial fibrillation now in NSR, anticoagulation with eliquis last dose 36 hours ago,  controlled hypertension, osteoporiosis, DJD of both knees, urinary incontinence and advanced age. Exam, lab, EKG and CXR are stable. The patient is medically stable, medically optimized and has no medical contraindication to surgery later today as required.  TTE 2016: EF 65%, 1) mild MR, min TR. Normal left ventricular internal dimensions and wall thicknesses. 2. Normal left ventricular systolic function. No segmental wall motion abnormalities. 3. Mild diastolic dysfunction (Stage I). 4. Normal right ventricular size and function. 5. Agitated saline injection demonstrates no evidence of a patent foramen ovale.  KATHRYN 9/2016: Conclusions:  1. Mitral annular calcification, otherwise normal mitral  valve. No vegetations seen.  2. Calcified trileaflet aortic valve with normal opening.  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  3. Moderate atheroma noted in aortic arch/descending aorta.  4. No left atrial or left atrial appendage thrombus seen.  5. Grossly normal left ventricular systolic function.  6. Grossly normal tricuspid valve. Mild tricuspid  regurgitation.  7. Grossly normal pulmonic valve. Minimal pulmonic  regurgitation.  8. Contrast injection demonstrates no evidence of a patent  foramen ovale.  9. Small pericardial effusion. medical clearance: accidental fall with a right subcapital femoral neck fracture that requires orthopedic ORIF. Her comorbidities include paroxysmal atrial fibrillation now in NSR, anticoagulation with eliquis last dose 36 hours ago,  controlled hypertension, osteoporiosis, DJD of both knees, urinary incontinence and advanced age. Exam, lab, EKG and CXR are stable. The patient is medically stable, medically optimized and has no medical contraindication to surgery later today as required; shoulder surgery; splenic drain for abscess  TTE 2016: EF 65%, 1) mild MR, min TR. Normal left ventricular internal dimensions and wall thicknesses. 2. Normal left ventricular systolic function. No segmental wall motion abnormalities. 3. Mild diastolic dysfunction (Stage I). 4. Normal right ventricular size and function. 5. Agitated saline injection demonstrates no evidence of a patent foramen ovale.  KATHRYN 9/2016: Conclusions:  1. Mitral annular calcification, otherwise normal mitral  valve. No vegetations seen.  2. Calcified trileaflet aortic valve with normal opening.  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  3. Moderate atheroma noted in aortic arch/descending aorta.  4. No left atrial or left atrial appendage thrombus seen.  5. Grossly normal left ventricular systolic function.  6. Grossly normal tricuspid valve. Mild tricuspid  regurgitation.  7. Grossly normal pulmonic valve. Minimal pulmonic  regurgitation.  8. Contrast injection demonstrates no evidence of a patent  foramen ovale.  9. Small pericardial effusion.

## 2019-06-22 NOTE — H&P ADULT - NSHPPHYSICALEXAM_GEN_ALL_CORE
PE   RLE:  Skin intact; No ecchymosis/soft tissue swelling  foot with external rotation deformity, per patient it is chronic as a result of childhood polio  Compartments soft; + TTP about hip. No TTP to leg/ankle/foot, mild TTP about knee  ROM lmited 2/2 pain  Unable to SLR; + Log Roll/Heel Strike  Motor intact GS/TA/FHL/EHL, weak 2/2 childhood polio  SILT L2-S1  WWP distally    LLE/BUE:   No bony TTP; Good ROM w/o pain; Exam Unremarkable

## 2019-06-22 NOTE — CONSULT NOTE ADULT - SUBJECTIVE AND OBJECTIVE BOX
The patient was seen and examined today after an accidental fall with a right subcapital femoral neck fracture that requires orthopedic ORIF. Her comorbidities include paroxysmal atrial fibrillation now in NSR, anticoagulation with eliquis last dose 36 hours ago,  controlled hypertension, osteoporiosis, DJD of both knessurinary incontenence and advanced age. Exam, lab, EKG and CXR are stable. The patient is medically stable, medically optimized and has no medical contraindication to surgery later today as required. Exam time 70 minutes including > than 50 % for bedside discussion and counseling. Comprehensive consultation dictated #[id number]		.

## 2019-06-22 NOTE — CHART NOTE - NSCHARTNOTEFT_GEN_A_CORE
POST-OPERATIVE NOTE    Subjective:  Patient is s/p R hip elia. Recovering appropriately.     Vital Signs Last 24 Hrs  T(C): 36 (2019 22:00), Max: 37.2 (2019 05:29)  T(F): 96.8 (2019 22:00), Max: 98.9 (2019 05:29)  HR: 77 (2019 22:30) (64 - 92)  BP: 108/52 (2019 22:30) (107/52 - 152/76)  BP(mean): 75 (2019 22:30) (75 - 93)  RR: 16 (2019 22:30) (16 - 18)  SpO2: 97% (2019 22:30) (91% - 98%)  I&O's Detail    2019 07:01  -  2019 22:48  --------------------------------------------------------  IN:    sodium chloride 0.9%: 1500 mL  Total IN: 1500 mL    OUT:    Incontinent per Collection Ba mL  Total OUT: 600 mL    Total NET: 900 mL        amLODIPine   Tablet 5  losartan 100    PAST MEDICAL & SURGICAL HISTORY:  Pleural effusion on left  Splenic abscess  Polio  High cholesterol  Hypertension  History of appendectomy        Physical Exam:  General: NAD, resting comfortably in bed  Pulmonary: Nonlabored breathing, no respiratory distress  Gen: awake, alert, NAD  Resp: no increased work of breathing  RLE:  dressing c/d/i  motor intact however weak 2/2 childhood polio  SILT L3-S1  +DP/PT Pulses  Compartments soft  No calf TTP       LABS:                        12.8   9.6   )-----------( 202      ( 2019 05:58 )             37.8     -    139  |  103  |  27<H>  ----------------------------<  134<H>  5.0   |  25  |  1.48<H>    Ca    9.4      2019 05:58    TPro  x   /  Alb  3.9  /  TBili  x   /  DBili  x   /  AST  x   /  ALT  x   /  AlkPhos  x       PT/INR - ( 2019 05:58 )   PT: 13.0 sec;   INR: 1.14 ratio         PTT - ( 2019 05:58 )  PTT:33.9 sec  CAPILLARY BLOOD GLUCOSE      Assessment:  The patient is a 89y Female who is now several hours post-op from a R hip hemiarthroplasty    Plan:  - Pain control as needed  - DVT ppx  - OOB and ambulating as tolerated  - WBAT  - PT/OT  - F/u AM labs

## 2019-06-23 LAB
ANION GAP SERPL CALC-SCNC: 12 MMOL/L — SIGNIFICANT CHANGE UP (ref 5–17)
BASOPHILS # BLD AUTO: 0.03 K/UL — SIGNIFICANT CHANGE UP (ref 0–0.2)
BASOPHILS NFR BLD AUTO: 0.4 % — SIGNIFICANT CHANGE UP (ref 0–2)
BUN SERPL-MCNC: 24 MG/DL — HIGH (ref 7–23)
CALCIUM SERPL-MCNC: 8.8 MG/DL — SIGNIFICANT CHANGE UP (ref 8.4–10.5)
CHLORIDE SERPL-SCNC: 107 MMOL/L — SIGNIFICANT CHANGE UP (ref 96–108)
CO2 SERPL-SCNC: 21 MMOL/L — LOW (ref 22–31)
CREAT SERPL-MCNC: 1.41 MG/DL — HIGH (ref 0.5–1.3)
EOSINOPHIL # BLD AUTO: 0.16 K/UL — SIGNIFICANT CHANGE UP (ref 0–0.5)
EOSINOPHIL NFR BLD AUTO: 1.9 % — SIGNIFICANT CHANGE UP (ref 0–6)
GLUCOSE SERPL-MCNC: 168 MG/DL — HIGH (ref 70–99)
HCT VFR BLD CALC: 34.7 % — SIGNIFICANT CHANGE UP (ref 34.5–45)
HGB BLD-MCNC: 10.7 G/DL — LOW (ref 11.5–15.5)
IMM GRANULOCYTES NFR BLD AUTO: 0.4 % — SIGNIFICANT CHANGE UP (ref 0–1.5)
LYMPHOCYTES # BLD AUTO: 0.8 K/UL — LOW (ref 1–3.3)
LYMPHOCYTES # BLD AUTO: 9.4 % — LOW (ref 13–44)
MCHC RBC-ENTMCNC: 30.2 PG — SIGNIFICANT CHANGE UP (ref 27–34)
MCHC RBC-ENTMCNC: 30.8 GM/DL — LOW (ref 32–36)
MCV RBC AUTO: 98 FL — SIGNIFICANT CHANGE UP (ref 80–100)
MONOCYTES # BLD AUTO: 0.32 K/UL — SIGNIFICANT CHANGE UP (ref 0–0.9)
MONOCYTES NFR BLD AUTO: 3.8 % — SIGNIFICANT CHANGE UP (ref 2–14)
NEUTROPHILS # BLD AUTO: 7.16 K/UL — SIGNIFICANT CHANGE UP (ref 1.8–7.4)
NEUTROPHILS NFR BLD AUTO: 84.1 % — HIGH (ref 43–77)
PLATELET # BLD AUTO: 170 K/UL — SIGNIFICANT CHANGE UP (ref 150–400)
POTASSIUM SERPL-MCNC: 4.8 MMOL/L — SIGNIFICANT CHANGE UP (ref 3.5–5.3)
POTASSIUM SERPL-SCNC: 4.8 MMOL/L — SIGNIFICANT CHANGE UP (ref 3.5–5.3)
RBC # BLD: 3.54 M/UL — LOW (ref 3.8–5.2)
RBC # FLD: 13 % — SIGNIFICANT CHANGE UP (ref 10.3–14.5)
SODIUM SERPL-SCNC: 140 MMOL/L — SIGNIFICANT CHANGE UP (ref 135–145)
WBC # BLD: 8.5 K/UL — SIGNIFICANT CHANGE UP (ref 3.8–10.5)
WBC # FLD AUTO: 8.5 K/UL — SIGNIFICANT CHANGE UP (ref 3.8–10.5)

## 2019-06-23 RX ORDER — LIDOCAINE 4 G/100G
1 CREAM TOPICAL EVERY 24 HOURS
Refills: 0 | Status: DISCONTINUED | OUTPATIENT
Start: 2019-06-23 | End: 2019-07-13

## 2019-06-23 RX ORDER — TRAMADOL HYDROCHLORIDE 50 MG/1
25 TABLET ORAL EVERY 4 HOURS
Refills: 0 | Status: DISCONTINUED | OUTPATIENT
Start: 2019-06-23 | End: 2019-06-30

## 2019-06-23 RX ORDER — TRAMADOL HYDROCHLORIDE 50 MG/1
50 TABLET ORAL EVERY 4 HOURS
Refills: 0 | Status: DISCONTINUED | OUTPATIENT
Start: 2019-06-23 | End: 2019-06-30

## 2019-06-23 RX ORDER — OXYBUTYNIN CHLORIDE 5 MG
5 TABLET ORAL DAILY
Refills: 0 | Status: DISCONTINUED | OUTPATIENT
Start: 2019-06-23 | End: 2019-07-13

## 2019-06-23 RX ADMIN — Medication 975 MILLIGRAM(S): at 21:45

## 2019-06-23 RX ADMIN — SENNA PLUS 2 TABLET(S): 8.6 TABLET ORAL at 21:14

## 2019-06-23 RX ADMIN — Medication 100 MILLIGRAM(S): at 13:34

## 2019-06-23 RX ADMIN — APIXABAN 2.5 MILLIGRAM(S): 2.5 TABLET, FILM COATED ORAL at 06:36

## 2019-06-23 RX ADMIN — Medication 975 MILLIGRAM(S): at 13:33

## 2019-06-23 RX ADMIN — TRAMADOL HYDROCHLORIDE 25 MILLIGRAM(S): 50 TABLET ORAL at 11:50

## 2019-06-23 RX ADMIN — Medication 100 MILLIGRAM(S): at 03:03

## 2019-06-23 RX ADMIN — LOSARTAN POTASSIUM 100 MILLIGRAM(S): 100 TABLET, FILM COATED ORAL at 06:40

## 2019-06-23 RX ADMIN — TRAMADOL HYDROCHLORIDE 25 MILLIGRAM(S): 50 TABLET ORAL at 21:45

## 2019-06-23 RX ADMIN — Medication 100 MILLIGRAM(S): at 10:24

## 2019-06-23 RX ADMIN — TRAMADOL HYDROCHLORIDE 25 MILLIGRAM(S): 50 TABLET ORAL at 21:14

## 2019-06-23 RX ADMIN — LIDOCAINE 1 PATCH: 4 CREAM TOPICAL at 12:03

## 2019-06-23 RX ADMIN — Medication 975 MILLIGRAM(S): at 07:10

## 2019-06-23 RX ADMIN — TRAMADOL HYDROCHLORIDE 25 MILLIGRAM(S): 50 TABLET ORAL at 12:25

## 2019-06-23 RX ADMIN — SIMVASTATIN 20 MILLIGRAM(S): 20 TABLET, FILM COATED ORAL at 21:15

## 2019-06-23 RX ADMIN — Medication 975 MILLIGRAM(S): at 14:00

## 2019-06-23 RX ADMIN — Medication 975 MILLIGRAM(S): at 06:40

## 2019-06-23 RX ADMIN — Medication 975 MILLIGRAM(S): at 21:14

## 2019-06-23 RX ADMIN — POLYETHYLENE GLYCOL 3350 17 GRAM(S): 17 POWDER, FOR SOLUTION ORAL at 11:55

## 2019-06-23 RX ADMIN — PANTOPRAZOLE SODIUM 40 MILLIGRAM(S): 20 TABLET, DELAYED RELEASE ORAL at 06:40

## 2019-06-23 RX ADMIN — Medication 100 MILLIGRAM(S): at 06:40

## 2019-06-23 RX ADMIN — APIXABAN 2.5 MILLIGRAM(S): 2.5 TABLET, FILM COATED ORAL at 18:02

## 2019-06-23 RX ADMIN — Medication 100 MILLIGRAM(S): at 21:14

## 2019-06-23 RX ADMIN — LIDOCAINE 1 PATCH: 4 CREAM TOPICAL at 20:10

## 2019-06-23 RX ADMIN — Medication 5 MILLIGRAM(S): at 11:52

## 2019-06-23 RX ADMIN — AMLODIPINE BESYLATE 5 MILLIGRAM(S): 2.5 TABLET ORAL at 06:40

## 2019-06-23 NOTE — PROGRESS NOTE ADULT - SUBJECTIVE AND OBJECTIVE BOX
Patient resting without complaints.  No chest pain, SOB, N/V.    T(C): 36.9 (06-23-19 @ 05:28), Max: 36.9 (06-22-19 @ 08:02)  HR: 81 (06-23-19 @ 05:28) (64 - 92)  BP: 132/58 (06-23-19 @ 05:28) (105/56 - 148/68)  RR: 18 (06-23-19 @ 05:28) (16 - 18)  SpO2: 94% (06-23-19 @ 05:28) (93% - 98%)      Exam:  Alert and Oriented, No Acute Distress  Lower Extremities:  RLE: Aquacel Dressing C/D/I, Dull sensation grossly intact, No hematoma appreciated, Abduction pillow in place,  Toes warm and mobile,  Calves soft, NT                            13.0   12.6  )-----------( 212      ( 22 Jun 2019 22:54 )             37.6    06-22    143  |  107  |  24<H>  ----------------------------<  185<H>  4.5   |  21<L>  |  1.41<H>    Ca    9.2      22 Jun 2019 22:54    TPro  x   /  Alb  3.9  /  TBili  x   /  DBili  x   /  AST  x   /  ALT  x   /  AlkPhos  x   06-22

## 2019-06-23 NOTE — PROGRESS NOTE ADULT - ASSESSMENT
The patient underwent a right hemiarthroplasty ORIF surgery on 6/22/2019. The patient is now postop, feeling well and complaining of moderate right hip incisional pain.  She is anxious to be out of bed to chair and begin ambulation, which will be performed tomorrow morning.   The patient has no new medical complaints at this time.

## 2019-06-23 NOTE — PROGRESS NOTE ADULT - SUBJECTIVE AND OBJECTIVE BOX
Patient is a 89y old  Female who presents with a chief complaint of hip fracture (2019 07:36)        MEDICATIONS  (STANDING):  acetaminophen   Tablet .. 975 milliGRAM(s) Oral every 8 hours  amLODIPine   Tablet 5 milliGRAM(s) Oral daily  apixaban 2.5 milliGRAM(s) Oral every 12 hours  ceFAZolin   IVPB 2000 milliGRAM(s) IV Intermittent every 8 hours  docusate sodium 100 milliGRAM(s) Oral three times a day  losartan 100 milliGRAM(s) Oral daily  pantoprazole    Tablet 40 milliGRAM(s) Oral before breakfast  polyethylene glycol 3350 17 Gram(s) Oral daily  senna 2 Tablet(s) Oral at bedtime  simvastatin 20 milliGRAM(s) Oral at bedtime  sodium chloride 0.9%. 1000 milliLiter(s) (125 mL/Hr) IV Continuous <Continuous>    MEDICATIONS  (PRN):  magnesium hydroxide Suspension 30 milliLiter(s) Oral daily PRN Constipation  mirtazapine 15 milliGRAM(s) Oral at bedtime PRN Insomnia  morphine  - Injectable 1 milliGRAM(s) IV Push every 2 hours PRN Severe Pain (7 - 10)  morphine  - Injectable 2 milliGRAM(s) IV Push every 4 hours PRN severe breakthrough pain  ondansetron Injectable 4 milliGRAM(s) IV Push every 6 hours PRN Nausea and/or Vomiting  oxyCODONE    IR 2.5 milliGRAM(s) Oral every 4 hours PRN Moderate Pain (4 - 6)  oxyCODONE    IR 5 milliGRAM(s) Oral every 4 hours PRN Severe Pain (7 - 10)      Allergies    No Known Allergies    Intolerances      VITALS:   T(C): 37.4 (19 @ 08:40), Max: 37.4 (19 @ 08:40)  HR: 75 (19 @ 08:40) (64 - 92)  BP: 108/48 (19 @ 08:40) (105/56 - 148/68)  RR: 18 (19 @ 08:40) (16 - 18)  SpO2: 95% (19 @ 08:40) (93% - 98%)  Wt(kg): --    PHYSICAL EXAM:  GENERAL: NAD, well nourished and conversant  HEAD:  Atraumatic  EYES: EOM, PERRLA, conjunctiva pink and sclera white  ENT: No tonsillar erythema, exudates, or enlargement, moist mucous membranes, good dentition, no lesions  NECK: Supple, No JVD, normal thyroid, carotids with normal upstrokes and no bruits  CHEST/LUNG: Clear to auscultation bilaterally, No rales, rhonchi, wheezing, or rubs  HEART: Regular rate and rhythm, No murmurs, rubs, or gallops  ABDOMEN: Soft, nondistended, no masses, guarding, tenderness or rebound, bowel sounds present  EXTREMITIES:  2+ Peripheral Pulses, No clubbing, cyanosis, or edema. No arthritis of shoulders, elbows, hands, hips, knees, ankles, or feet. No DJD C spine, T spine, or L/S spine  LYMPH: No lymphadenopathy noted  SKIN: No rashes or lesions  NERVOUS SYSTEM:  Alert & Oriented X3, normal cognitive function. Motor Strength 5/5 right upper and right lower.  5/5 left upper and left lower extremities, DTRs 2+ intact and symmetric    LABS:        CBC Full  -  ( 2019 08:46 )  WBC Count : 8.50 K/uL  RBC Count : 3.54 M/uL  Hemoglobin : 10.7 g/dL  Hematocrit : 34.7 %  Platelet Count - Automated : 170 K/uL  Mean Cell Volume : 98.0 fl  Mean Cell Hemoglobin : 30.2 pg  Mean Cell Hemoglobin Concentration : 30.8 gm/dL  Auto Neutrophil # : 7.16 K/uL  Auto Lymphocyte # : 0.80 K/uL  Auto Monocyte # : 0.32 K/uL  Auto Eosinophil # : 0.16 K/uL  Auto Basophil # : 0.03 K/uL  Auto Neutrophil % : 84.1 %  Auto Lymphocyte % : 9.4 %  Auto Monocyte % : 3.8 %  Auto Eosinophil % : 1.9 %  Auto Basophil % : 0.4 %    -    140  |  107  |  24<H>  ----------------------------<  168<H>  4.8   |  21<L>  |  1.41<H>    Ca    8.8      2019 07:00    TPro  x   /  Alb  3.9  /  TBili  x   /  DBili  x   /  AST  x   /  ALT  x   /  AlkPhos  x   -22    LIVER FUNCTIONS - ( 2019 05:58 )  Alb: 3.9 g/dL / Pro: x     / ALK PHOS: x     / ALT: x     / AST: x     / GGT: x           PT/INR - ( 2019 05:58 )   PT: 13.0 sec;   INR: 1.14 ratio         PTT - ( 2019 05:58 )  PTT:33.9 sec  Urinalysis Basic - ( 2019 14:54 )    Color: Yellow / Appearance: Clear / S.021 / pH: x  Gluc: x / Ketone: Negative  / Bili: Negative / Urobili: <2 mg/dL   Blood: x / Protein: Trace / Nitrite: Negative   Leuk Esterase: Moderate / RBC: 2 /HPF / WBC 6 /HPF   Sq Epi: x / Non Sq Epi: 3 /HPF / Bacteria: Negative      CAPILLARY BLOOD GLUCOSE          RADIOLOGY & ADDITIONAL TESTS:      Consultant(s):    Care Discussed with Consultants/Other Providers [ ] YES  [ ] NO Patient is a 89y old  Female who presents with a chief complaint of hip fracture.  s/p mech fall in the kitchen onto R hip when right leg gave way, now c/o severe right hip pain and inability to ambulate.  Patient denies headstrike or LOC. Localizes pain to right hip/femur/knee. Patient denies radiation of pain. Patient denies numbness/tingling/burning in the RLE. No other bone/joint complaints. Patient is a community ambulator at baseline with a rolling walker. Patient has no issues w/ ADLs/IADLs. The patient underwent a right hemiarthroplasty ORIF surgery on 2019. The patient is now postop, feeling well and complaining of moderate right hip incisional pain.  She is anxious to be out of bed to chair and begin ambulation, which will be performed tomorrow morning.   The patient has no new medical complaints at this time.        MEDICATIONS  (STANDING):  acetaminophen   Tablet .. 975 milliGRAM(s) Oral every 8 hours  amLODIPine   Tablet 5 milliGRAM(s) Oral daily  apixaban 2.5 milliGRAM(s) Oral every 12 hours  ceFAZolin   IVPB 2000 milliGRAM(s) IV Intermittent every 8 hours  docusate sodium 100 milliGRAM(s) Oral three times a day  losartan 100 milliGRAM(s) Oral daily  pantoprazole    Tablet 40 milliGRAM(s) Oral before breakfast  polyethylene glycol 3350 17 Gram(s) Oral daily  senna 2 Tablet(s) Oral at bedtime  simvastatin 20 milliGRAM(s) Oral at bedtime  sodium chloride 0.9%. 1000 milliLiter(s) (125 mL/Hr) IV Continuous <Continuous>    MEDICATIONS  (PRN):  magnesium hydroxide Suspension 30 milliLiter(s) Oral daily PRN Constipation  mirtazapine 15 milliGRAM(s) Oral at bedtime PRN Insomnia  morphine  - Injectable 1 milliGRAM(s) IV Push every 2 hours PRN Severe Pain (7 - 10)  morphine  - Injectable 2 milliGRAM(s) IV Push every 4 hours PRN severe breakthrough pain  ondansetron Injectable 4 milliGRAM(s) IV Push every 6 hours PRN Nausea and/or Vomiting  oxyCODONE    IR 2.5 milliGRAM(s) Oral every 4 hours PRN Moderate Pain (4 - 6)  oxyCODONE    IR 5 milliGRAM(s) Oral every 4 hours PRN Severe Pain (7 - 10)      Allergies    No Known Allergies    Intolerances      VITALS:   T(C): 37.4 (19 @ 08:40), Max: 37.4 (19 @ 08:40)  HR: 75 (19 @ 08:40) (64 - 92)  BP: 108/48 (19 @ 08:40) (105/56 - 148/68)  RR: 18 (19 @ 08:40) (16 - 18)  SpO2: 95% (19 @ 08:40) (93% - 98%)  Wt(kg): --    PHYSICAL EXAM:  GENERAL: NAD, well nourished and conversant  HEAD:  Atraumatic  EYES: EOM, PERRLA, conjunctiva pink and sclera white  ENT: No tonsillar erythema, exudates, or enlargement, moist mucous membranes, good dentition, no lesions  NECK: Supple, No JVD, normal thyroid, carotids with normal upstrokes and no bruits  CHEST/LUNG: Clear to auscultation bilaterally, No rales, rhonchi, wheezing, or rubs  HEART: Regular rate and rhythm, No murmurs, rubs, or gallops  ABDOMEN: Soft, nondistended, no masses, guarding, tenderness or rebound, bowel sounds present  EXTREMITIES:  2+ Peripheral Pulses, No clubbing, cyanosis, or edema. No arthritis of shoulders, elbows, hands, hips, knees, ankles, or feet. No DJD C spine, T spine, or L/S spine  LYMPH: No lymphadenopathy noted  SKIN: No rashes or lesions  NERVOUS SYSTEM:  Alert & Oriented X3, normal cognitive function. Motor Strength 5/5 right upper and right lower.  5/5 left upper and left lower extremities, DTRs 2+ intact and symmetric    LABS:        CBC Full  -  ( 2019 08:46 )  WBC Count : 8.50 K/uL  RBC Count : 3.54 M/uL  Hemoglobin : 10.7 g/dL  Hematocrit : 34.7 %  Platelet Count - Automated : 170 K/uL  Mean Cell Volume : 98.0 fl  Mean Cell Hemoglobin : 30.2 pg  Mean Cell Hemoglobin Concentration : 30.8 gm/dL  Auto Neutrophil # : 7.16 K/uL  Auto Lymphocyte # : 0.80 K/uL  Auto Monocyte # : 0.32 K/uL  Auto Eosinophil # : 0.16 K/uL  Auto Basophil # : 0.03 K/uL  Auto Neutrophil % : 84.1 %  Auto Lymphocyte % : 9.4 %  Auto Monocyte % : 3.8 %  Auto Eosinophil % : 1.9 %  Auto Basophil % : 0.4 %        140  |  107  |  24<H>  ----------------------------<  168<H>  4.8   |  21<L>  |  1.41<H>    Ca    8.8      2019 07:00    TPro  x   /  Alb  3.9  /  TBili  x   /  DBili  x   /  AST  x   /  ALT  x   /  AlkPhos  x       LIVER FUNCTIONS - ( 2019 05:58 )  Alb: 3.9 g/dL / Pro: x     / ALK PHOS: x     / ALT: x     / AST: x     / GGT: x           PT/INR - ( 2019 05:58 )   PT: 13.0 sec;   INR: 1.14 ratio         PTT - ( 2019 05:58 )  PTT:33.9 sec  Urinalysis Basic - ( 2019 14:54 )    Color: Yellow / Appearance: Clear / S.021 / pH: x  Gluc: x / Ketone: Negative  / Bili: Negative / Urobili: <2 mg/dL   Blood: x / Protein: Trace / Nitrite: Negative   Leuk Esterase: Moderate / RBC: 2 /HPF / WBC 6 /HPF   Sq Epi: x / Non Sq Epi: 3 /HPF / Bacteria: Negative      CAPILLARY BLOOD GLUCOSE          RADIOLOGY & ADDITIONAL TESTS:      Consultant(s):    Care Discussed with Consultants/Other Providers [ ] YES  [ ] NO

## 2019-06-23 NOTE — PROGRESS NOTE ADULT - ASSESSMENT
A/P: 89 y/o F POD# 1 S/P R Willam    DVT ppx - Eliquis  RLE WBAT  PT  Pain management prn  FU AM labs      HENRY Casarez  Orthopedic Surgery  [phone]

## 2019-06-24 ENCOUNTER — TRANSCRIPTION ENCOUNTER (OUTPATIENT)
Age: 84
End: 2019-06-24

## 2019-06-24 LAB — 24R-OH-CALCIDIOL SERPL-MCNC: 26.3 NG/ML — LOW (ref 30–80)

## 2019-06-24 PROCEDURE — 70450 CT HEAD/BRAIN W/O DYE: CPT | Mod: 26

## 2019-06-24 PROCEDURE — 93010 ELECTROCARDIOGRAM REPORT: CPT

## 2019-06-24 PROCEDURE — 71045 X-RAY EXAM CHEST 1 VIEW: CPT | Mod: 26

## 2019-06-24 PROCEDURE — 70553 MRI BRAIN STEM W/O & W/DYE: CPT | Mod: 26

## 2019-06-24 RX ORDER — ACETAMINOPHEN 500 MG
1000 TABLET ORAL ONCE
Refills: 0 | Status: COMPLETED | OUTPATIENT
Start: 2019-06-24 | End: 2019-06-24

## 2019-06-24 RX ORDER — ACETAMINOPHEN 500 MG
975 TABLET ORAL ONCE
Refills: 0 | Status: COMPLETED | OUTPATIENT
Start: 2019-06-24 | End: 2019-06-24

## 2019-06-24 RX ORDER — LOSARTAN POTASSIUM 100 MG/1
100 TABLET, FILM COATED ORAL DAILY
Refills: 0 | Status: DISCONTINUED | OUTPATIENT
Start: 2019-06-24 | End: 2019-07-13

## 2019-06-24 RX ORDER — ASPIRIN/CALCIUM CARB/MAGNESIUM 324 MG
325 TABLET ORAL DAILY
Refills: 0 | Status: DISCONTINUED | OUTPATIENT
Start: 2019-06-24 | End: 2019-07-13

## 2019-06-24 RX ORDER — DOCUSATE SODIUM 100 MG
1 CAPSULE ORAL
Qty: 0 | Refills: 0 | DISCHARGE
Start: 2019-06-24

## 2019-06-24 RX ORDER — APIXABAN 2.5 MG/1
1 TABLET, FILM COATED ORAL
Qty: 0 | Refills: 0 | DISCHARGE
Start: 2019-06-24

## 2019-06-24 RX ORDER — APIXABAN 2.5 MG/1
5 TABLET, FILM COATED ORAL
Refills: 0 | Status: DISCONTINUED | OUTPATIENT
Start: 2019-06-24 | End: 2019-07-06

## 2019-06-24 RX ORDER — AMLODIPINE BESYLATE 2.5 MG/1
5 TABLET ORAL DAILY
Refills: 0 | Status: DISCONTINUED | OUTPATIENT
Start: 2019-06-24 | End: 2019-07-13

## 2019-06-24 RX ORDER — ACETAMINOPHEN 500 MG
3 TABLET ORAL
Qty: 0 | Refills: 0 | DISCHARGE
Start: 2019-06-24

## 2019-06-24 RX ORDER — TRAMADOL HYDROCHLORIDE 50 MG/1
0.5 TABLET ORAL
Qty: 0 | Refills: 0 | DISCHARGE
Start: 2019-06-24

## 2019-06-24 RX ORDER — POLYETHYLENE GLYCOL 3350 17 G/17G
17 POWDER, FOR SOLUTION ORAL
Qty: 0 | Refills: 0 | DISCHARGE
Start: 2019-06-24

## 2019-06-24 RX ORDER — ASPIRIN/CALCIUM CARB/MAGNESIUM 324 MG
325 TABLET ORAL ONCE
Refills: 0 | Status: COMPLETED | OUTPATIENT
Start: 2019-06-24 | End: 2019-06-24

## 2019-06-24 RX ADMIN — Medication 1000 MILLIGRAM(S): at 13:41

## 2019-06-24 RX ADMIN — APIXABAN 5 MILLIGRAM(S): 2.5 TABLET, FILM COATED ORAL at 18:43

## 2019-06-24 RX ADMIN — LIDOCAINE 1 PATCH: 4 CREAM TOPICAL at 00:59

## 2019-06-24 RX ADMIN — TRAMADOL HYDROCHLORIDE 25 MILLIGRAM(S): 50 TABLET ORAL at 05:33

## 2019-06-24 RX ADMIN — Medication 100 MILLIGRAM(S): at 22:14

## 2019-06-24 RX ADMIN — PANTOPRAZOLE SODIUM 40 MILLIGRAM(S): 20 TABLET, DELAYED RELEASE ORAL at 05:34

## 2019-06-24 RX ADMIN — SIMVASTATIN 20 MILLIGRAM(S): 20 TABLET, FILM COATED ORAL at 22:15

## 2019-06-24 RX ADMIN — LIDOCAINE 1 PATCH: 4 CREAM TOPICAL at 12:13

## 2019-06-24 RX ADMIN — LOSARTAN POTASSIUM 100 MILLIGRAM(S): 100 TABLET, FILM COATED ORAL at 05:34

## 2019-06-24 RX ADMIN — Medication 100 MILLIGRAM(S): at 05:35

## 2019-06-24 RX ADMIN — Medication 400 MILLIGRAM(S): at 13:18

## 2019-06-24 RX ADMIN — Medication 975 MILLIGRAM(S): at 19:23

## 2019-06-24 RX ADMIN — Medication 325 MILLIGRAM(S): at 18:44

## 2019-06-24 RX ADMIN — TRAMADOL HYDROCHLORIDE 25 MILLIGRAM(S): 50 TABLET ORAL at 06:03

## 2019-06-24 RX ADMIN — Medication 5 MILLIGRAM(S): at 12:13

## 2019-06-24 RX ADMIN — SENNA PLUS 2 TABLET(S): 8.6 TABLET ORAL at 22:15

## 2019-06-24 RX ADMIN — Medication 975 MILLIGRAM(S): at 05:34

## 2019-06-24 RX ADMIN — Medication 975 MILLIGRAM(S): at 06:03

## 2019-06-24 RX ADMIN — TRAMADOL HYDROCHLORIDE 50 MILLIGRAM(S): 50 TABLET ORAL at 12:12

## 2019-06-24 RX ADMIN — Medication 975 MILLIGRAM(S): at 22:16

## 2019-06-24 RX ADMIN — TRAMADOL HYDROCHLORIDE 50 MILLIGRAM(S): 50 TABLET ORAL at 12:45

## 2019-06-24 RX ADMIN — APIXABAN 2.5 MILLIGRAM(S): 2.5 TABLET, FILM COATED ORAL at 05:34

## 2019-06-24 RX ADMIN — AMLODIPINE BESYLATE 5 MILLIGRAM(S): 2.5 TABLET ORAL at 05:34

## 2019-06-24 RX ADMIN — POLYETHYLENE GLYCOL 3350 17 GRAM(S): 17 POWDER, FOR SOLUTION ORAL at 12:13

## 2019-06-24 NOTE — CHART NOTE - NSCHARTNOTEFT_GEN_A_CORE
Called to bedside by PT Waqas, concerned of new onset LUE weakness, possible facial droop.  On evaluation, no cranial nerve findings, appreciate L>R weakness on .  Patient also states numbness to pain on forearm and hand.  Patient states symptoms began evening of 6/23/19 at about 8pm and did not alert staff or physicians this AM.  Will obtain non-contrast CT urgently, will consider neuro consult, and will hold eliquis until CT head performed.  If no acute bleed/findings will discuss with medicine/restart eliquis.  Patient is at high risk for TIA/Embolic stroke with hx afib.    ADRIENNE Puri PA-C  #[id number] Called to bedside by PT Waqas, concerned of new onset LUE weakness, possible facial droop.  On evaluation, no cranial nerve findings, appreciate L>R weakness on .  All other extremities grossly intact.  Patient also states numbness to pain on forearm and hand.  Patient states symptoms began evening of 6/23/19 at about 8pm and did not alert staff or physicians this AM.  Will obtain non-contrast CT urgently, will consider neuro consult, and will hold eliquis until CT head performed.  If no acute bleed/findings will discuss with medicine/restart eliquis.  Patient is at high risk for TIA/Embolic stroke with hx afib.    ADRIENNE Puri PA-C  #[id number]

## 2019-06-24 NOTE — CONSULT NOTE ADULT - ASSESSMENT
Pt is a 89 yo F with a PMH of HTN, HLD, A-Fib (on Eliquis) and Polio (w/ residual LLE 1/5 strength) who is admitted for right femoral neck fracture after a slip and fall. Neurology consulted for new onset left facial droop, RUE weakness w/ decreased sensation. Exam shows nasolabial flattening with JACKIE 4/5 and decreased sensation on the left with upgoing left toe. Possible symptoms 2/2 intracranial hemorrhage (especially given fall on Eliquis) but given time since fall and limited symptoms more likely ischemic. Very unlikely cervical spine pathology given laterality, full distribution of weakness and facial asymmetry. NIHSS: 6. MRS: 4.    Recommendations:  - Neuro Checks Q4hr  - Urgent CT head to r/o intracranial hemorrhage  - If no hemorrhage resume Eliquis immediately  - Carotid Duplex to assess for stenosis, if greater than 70% in the right ICA get Vascular eval for possible CEA (otherwise c/w medical management)  - TTE w/ bubble study can be done as outpatient  - Dysphagia Screen, if fails then Speech and Swallow Eval  - c/w Simvastatin 20mg PO HS when passed dysphagia  - Lipid Panel and HbA1c  - Permissive HTN (up to 220/110) for 24 hours followed by gradual normotension  - PT/OT

## 2019-06-24 NOTE — PROVIDER CONTACT NOTE (OTHER) - ASSESSMENT
c/o weakness/numbness L arm. Pain 10/10 managed with IV tylenol. VSS as charted. Stat EKG done and reported by MD CORNELIUS. According to Pt. daughter Pt experiencing these symptoms 6/23 post op but stated she didn't notify RN/provider because she thought it was anesthesia related. Since onset of symtpoms is unclear, PA stated no code stroke necessary b/c pt. not TPA candidate. c/o weakness/numbness L arm. Pain 10/10 managed with IV tylenol. VSS as charted. Stat EKG done and reported to MD CORNELIUS.  Pt's daughter at bedside when these symptoms started 6/23 post op but stated she didn't notify RN/provider because she thought it was anesthesia related. Since onset of symtpoms was unclear, PA stated no code stroke necessary b/c pt. not TPA candidate.

## 2019-06-24 NOTE — PROGRESS NOTE ADULT - SUBJECTIVE AND OBJECTIVE BOX
Patient is a 89y old  Female who presents with a chief complaint of hip fracture.  s/p mech fall in the kitchen onto R hip when right leg gave way, now c/o severe right hip pain and inability to ambulate.  Patient denies headstrike or LOC. Localizes pain to right hip/femur/knee. Patient denies radiation of pain. Patient denies numbness/tingling/burning in the RLE. No other bone/joint complaints. Patient is a community ambulator at baseline with a rolling walker. Patient has no issues w/ ADLs/IADLs. The patient underwent a right hemiarthroplasty ORIF surgery on 2019. The patient is now postop, feeling well and complaining of moderate right hip incisional pain.  She is anxious to be out of bed to chair and begin ambulation, which will be performed tomorrow morning.   The patient has no new medical complaints at this time.        MEDICATIONS  (STANDING):  acetaminophen   Tablet .. 975 milliGRAM(s) Oral every 8 hours  amLODIPine   Tablet 5 milliGRAM(s) Oral daily  apixaban 2.5 milliGRAM(s) Oral every 12 hours  ceFAZolin   IVPB 2000 milliGRAM(s) IV Intermittent every 8 hours  docusate sodium 100 milliGRAM(s) Oral three times a day  losartan 100 milliGRAM(s) Oral daily  pantoprazole    Tablet 40 milliGRAM(s) Oral before breakfast  polyethylene glycol 3350 17 Gram(s) Oral daily  senna 2 Tablet(s) Oral at bedtime  simvastatin 20 milliGRAM(s) Oral at bedtime  sodium chloride 0.9%. 1000 milliLiter(s) (125 mL/Hr) IV Continuous <Continuous>    MEDICATIONS  (PRN):  magnesium hydroxide Suspension 30 milliLiter(s) Oral daily PRN Constipation  mirtazapine 15 milliGRAM(s) Oral at bedtime PRN Insomnia  morphine  - Injectable 1 milliGRAM(s) IV Push every 2 hours PRN Severe Pain (7 - 10)  morphine  - Injectable 2 milliGRAM(s) IV Push every 4 hours PRN severe breakthrough pain  ondansetron Injectable 4 milliGRAM(s) IV Push every 6 hours PRN Nausea and/or Vomiting  oxyCODONE    IR 2.5 milliGRAM(s) Oral every 4 hours PRN Moderate Pain (4 - 6)  oxyCODONE    IR 5 milliGRAM(s) Oral every 4 hours PRN Severe Pain (7 - 10)      Allergies    No Known Allergies    Intolerances      VITALS:   T(C): 37.4 (19 @ 08:40), Max: 37.4 (19 @ 08:40)  HR: 75 (19 @ 08:40) (64 - 92)  BP: 108/48 (19 @ 08:40) (105/56 - 148/68)  RR: 18 (19 @ 08:40) (16 - 18)  SpO2: 95% (19 @ 08:40) (93% - 98%)  Wt(kg): --    PHYSICAL EXAM:  GENERAL: NAD, well nourished and conversant  HEAD:  Atraumatic  EYES: EOM, PERRLA, conjunctiva pink and sclera white  ENT: No tonsillar erythema, exudates, or enlargement, moist mucous membranes, good dentition, no lesions  NECK: Supple, No JVD, normal thyroid, carotids with normal upstrokes and no bruits  CHEST/LUNG: Clear to auscultation bilaterally, No rales, rhonchi, wheezing, or rubs  HEART: Regular rate and rhythm, No murmurs, rubs, or gallops  ABDOMEN: Soft, nondistended, no masses, guarding, tenderness or rebound, bowel sounds present  EXTREMITIES:  2+ Peripheral Pulses, No clubbing, cyanosis, or edema. No arthritis of shoulders, elbows, hands, hips, knees, ankles, or feet. No DJD C spine, T spine, or L/S spine  LYMPH: No lymphadenopathy noted  SKIN: No rashes or lesions  NERVOUS SYSTEM:  Alert & Oriented X3, normal cognitive function. Motor Strength 5/5 right upper and right lower.  5/5 left upper and left lower extremities, DTRs 2+ intact and symmetric    LABS:        CBC Full  -  ( 2019 08:46 )  WBC Count : 8.50 K/uL  RBC Count : 3.54 M/uL  Hemoglobin : 10.7 g/dL  Hematocrit : 34.7 %  Platelet Count - Automated : 170 K/uL  Mean Cell Volume : 98.0 fl  Mean Cell Hemoglobin : 30.2 pg  Mean Cell Hemoglobin Concentration : 30.8 gm/dL  Auto Neutrophil # : 7.16 K/uL  Auto Lymphocyte # : 0.80 K/uL  Auto Monocyte # : 0.32 K/uL  Auto Eosinophil # : 0.16 K/uL  Auto Basophil # : 0.03 K/uL  Auto Neutrophil % : 84.1 %  Auto Lymphocyte % : 9.4 %  Auto Monocyte % : 3.8 %  Auto Eosinophil % : 1.9 %  Auto Basophil % : 0.4 %        140  |  107  |  24<H>  ----------------------------<  168<H>  4.8   |  21<L>  |  1.41<H>    Ca    8.8      2019 07:00    TPro  x   /  Alb  3.9  /  TBili  x   /  DBili  x   /  AST  x   /  ALT  x   /  AlkPhos  x       LIVER FUNCTIONS - ( 2019 05:58 )  Alb: 3.9 g/dL / Pro: x     / ALK PHOS: x     / ALT: x     / AST: x     / GGT: x           PT/INR - ( 2019 05:58 )   PT: 13.0 sec;   INR: 1.14 ratio         PTT - ( 2019 05:58 )  PTT:33.9 sec  Urinalysis Basic - ( 2019 14:54 )    Color: Yellow / Appearance: Clear / S.021 / pH: x  Gluc: x / Ketone: Negative  / Bili: Negative / Urobili: <2 mg/dL   Blood: x / Protein: Trace / Nitrite: Negative   Leuk Esterase: Moderate / RBC: 2 /HPF / WBC 6 /HPF   Sq Epi: x / Non Sq Epi: 3 /HPF / Bacteria: Negative      CAPILLARY BLOOD GLUCOSE          RADIOLOGY & ADDITIONAL TESTS:      Consultant(s):    Care Discussed with Consultants/Other Providers [ ] YES  [ ] NO Patient is a 89y old  Female who presents with a chief complaint of hip fracture.  s/p mech fall in the kitchen onto R hip when right leg gave way, now c/o severe right hip pain and inability to ambulate.  Patient denies headstrike or LOC. Localizes pain to right hip/femur/knee. Patient denies radiation of pain. Patient denies numbness/tingling/burning in the RLE. No other bone/joint complaints. Patient is a community ambulator at baseline with a rolling walker. Patient has no issues w/ ADLs/IADLs. The patient underwent a right hemiarthroplasty ORIF surgery on 2019. The patient is now postop, feeling well and complaining of moderate right hip incisional pain. When last seen on the morning of 19, she is wanted to be out of bed to chair and begin ambulation. She had an uneventful day until 6:30 PM when she noted weakness of her left hand and altered ability to move her fingers.  Her daughter noticed facial weakness and slurring of words.  No staff was notified and the patient remained this way until next examined by physical therapy on the morning of 19.  She was found to have  significant left hemiparesis of the left arm greater than left leg, with facial droop and difficulty articulating.  The patient was alert and oriented x3 and she answered questions appropriately.  She had clear weakness in the left arm and left leg as compared to the day before.  EKG was obtained and she was in normal sinus rhythm,  with no ST-T wave changes. Blood pressure was low between 100- 120 systolic.  Blood pressure medications were held, unless blood pressure was greater than 130 systolic.  The patient had a neurological consultation and  right brain CVA was suspected.  The patient underwent CT of the brain at this time showed no hemorrhages or masses.  There was no hydrocephalus.  Possibility of infarction was present and an MRI was advised.  After the CT scan of the brain the patient received 325 mg of aspirin.  It was now approximately 16 hours after the onset of left hemiparesis.  The patient was transferred to a monitored bed to rule out  episodic paroxysmal atrial fibrillation that could have caused her  acute CVA.  The patient had previously been on 2.5 mg Eliquis twice a day and dosage wasnow increased to 5 mg twice a day for therapeutic anticoagulation.  Her rhythm has remained  NSR with a pulse of 64.  The patient has no pain and with the exception of left-sided weakness, has no changes in her condition.  She complains bitterly of right leg postoperative pain  The patient has no new medical complaints at this time. katie was submitted for MRI of the brain, carotid Doppler study, echocardiogram, and venous Doppler of both legs.  The daughter was present during the entire time that service was rendered.        MEDICATIONS  (STANDING):  acetaminophen   Tablet .. 975 milliGRAM(s) Oral every 8 hours  amLODIPine   Tablet 5 milliGRAM(s) Oral daily  apixaban 2.5 milliGRAM(s) Oral every 12 hours  ceFAZolin   IVPB 2000 milliGRAM(s) IV Intermittent every 8 hours  docusate sodium 100 milliGRAM(s) Oral three times a day  losartan 100 milliGRAM(s) Oral daily  pantoprazole    Tablet 40 milliGRAM(s) Oral before breakfast  polyethylene glycol 3350 17 Gram(s) Oral daily  senna 2 Tablet(s) Oral at bedtime  simvastatin 20 milliGRAM(s) Oral at bedtime  sodium chloride 0.9%. 1000 milliLiter(s) (125 mL/Hr) IV Continuous <Continuous>    MEDICATIONS  (PRN):  magnesium hydroxide Suspension 30 milliLiter(s) Oral daily PRN Constipation  mirtazapine 15 milliGRAM(s) Oral at bedtime PRN Insomnia  morphine  - Injectable 1 milliGRAM(s) IV Push every 2 hours PRN Severe Pain (7 - 10)  morphine  - Injectable 2 milliGRAM(s) IV Push every 4 hours PRN severe breakthrough pain  ondansetron Injectable 4 milliGRAM(s) IV Push every 6 hours PRN Nausea and/or Vomiting  oxyCODONE    IR 2.5 milliGRAM(s) Oral every 4 hours PRN Moderate Pain (4 - 6)  oxyCODONE    IR 5 milliGRAM(s) Oral every 4 hours PRN Severe Pain (7 - 10)      Allergies    No Known Allergies    Intolerances      VITALS:   T(C): 37.4 (19 @ 08:40), Max: 37.4 (19 @ 08:40)  HR: 75 (19 @ 08:40) (64 - 92)  BP: 108/48 (19 @ 08:40) (105/56 - 148/68)  RR: 18 (19 @ 08:40) (16 - 18)  SpO2: 95% (19 @ 08:40) (93% - 98%)  Wt(kg): --    PHYSICAL EXAM:  GENERAL: NAD, well nourished and conversant  HEAD:  Atraumatic  EYES: EOM, PERRLA, conjunctiva pink and sclera white  ENT: No tonsillar erythema, exudates, or enlargement, moist mucous membranes, good dentition, no lesions  NECK: Supple, No JVD, normal thyroid, carotids with normal upstrokes and no bruits  CHEST/LUNG: Clear to auscultation bilaterally, No rales, rhonchi, wheezing, or rubs  HEART: Regular rate and rhythm, No murmurs, rubs, or gallops  ABDOMEN: Soft, nondistended, no masses, guarding, tenderness or rebound, bowel sounds present  EXTREMITIES:  2+ Peripheral Pulses, No clubbing, cyanosis, or edema. No arthritis of shoulders, elbows, hands, hips, knees, ankles, or feet. No DJD C spine, T spine, or L/S spine  LYMPH: No lymphadenopathy noted  SKIN: No rashes or lesions  NERVOUS SYSTEM:  Alert & Oriented X3, normal cognitive function. Motor Strength 5/5 right upper and right lower.  5/5 left upper and left lower extremities, DTRs 2+ intact and symmetric    LABS:        CBC Full  -  ( 2019 08:46 )  WBC Count : 8.50 K/uL  RBC Count : 3.54 M/uL  Hemoglobin : 10.7 g/dL  Hematocrit : 34.7 %  Platelet Count - Automated : 170 K/uL  Mean Cell Volume : 98.0 fl  Mean Cell Hemoglobin : 30.2 pg  Mean Cell Hemoglobin Concentration : 30.8 gm/dL  Auto Neutrophil # : 7.16 K/uL  Auto Lymphocyte # : 0.80 K/uL  Auto Monocyte # : 0.32 K/uL  Auto Eosinophil # : 0.16 K/uL  Auto Basophil # : 0.03 K/uL  Auto Neutrophil % : 84.1 %  Auto Lymphocyte % : 9.4 %  Auto Monocyte % : 3.8 %  Auto Eosinophil % : 1.9 %  Auto Basophil % : 0.4 %        140  |  107  |  24<H>  ----------------------------<  168<H>  4.8   |  21<L>  |  1.41<H>    Ca    8.8      2019 07:00    TPro  x   /  Alb  3.9  /  TBili  x   /  DBili  x   /  AST  x   /  ALT  x   /  AlkPhos  x   06-22    LIVER FUNCTIONS - ( 2019 05:58 )  Alb: 3.9 g/dL / Pro: x     / ALK PHOS: x     / ALT: x     / AST: x     / GGT: x           PT/INR - ( 2019 05:58 )   PT: 13.0 sec;   INR: 1.14 ratio         PTT - ( 2019 05:58 )  PTT:33.9 sec  Urinalysis Basic - ( 2019 14:54 )    Color: Yellow / Appearance: Clear / S.021 / pH: x  Gluc: x / Ketone: Negative  / Bili: Negative / Urobili: <2 mg/dL   Blood: x / Protein: Trace / Nitrite: Negative   Leuk Esterase: Moderate / RBC: 2 /HPF / WBC 6 /HPF   Sq Epi: x / Non Sq Epi: 3 /HPF / Bacteria: Negative      CAPILLARY BLOOD GLUCOSE          RADIOLOGY & ADDITIONAL TESTS:      Consultant(s):    Care Discussed with Consultants/Other Providers [ ] YES  [ ] NO

## 2019-06-24 NOTE — DISCHARGE NOTE PROVIDER - NSDCFUADDINST_GEN_ALL_CORE_FT
Rehab staff may remove sutures/staples on post op day 14 if applicable. Keep dressing clean and dry. PT/OT: WBAT RLE. Continue taking eliquis for DVT prophylaxis as previously prescribed. Schedule appointment with Dr. Muhammad upon discharge from rehab for followup. Recommend scheduling followup appointment with PMD within 1 month of surgery for routine care. Rehab staff may remove sutures/staples on post op day 14 (7/5) if applicable. Keep dressing clean and dry. PT/OT: WBAT RLE. Continue taking Eliquis for DVT prophylaxis as previously prescribed. Schedule appointment with Dr. Muhammad upon discharge from rehab for followup. Recommend scheduling followup appointment with PMD within 1 month of surgery for routine care.

## 2019-06-24 NOTE — PROGRESS NOTE ADULT - SUBJECTIVE AND OBJECTIVE BOX
Orthopaedic Surgery Progress Note    Subjective:   Patient seen and examined  No acute events overnight  Pain well controlled  OOBTC with PT yesterda    Objective:  T(C): 37.1 (19 @ 04:29), Max: 37.6 (19 @ 20:22)  HR: 82 (19 @ 04:29) (63 - 82)  BP: 122/58 (19 @ 04:29) (95/51 - 150/85)  RR: 18 (19 @ 04:29) (18 - 18)  SpO2: 95% (19 @ 04:29) (93% - 97%)  Wt(kg): --     @ 07:01  -   @ 07:00  --------------------------------------------------------  IN: 3025 mL / OUT: 1300 mL / NET: 1725 mL     @ 07:01  -   @ 06:32  --------------------------------------------------------  IN: 1465 mL / OUT: 1200 mL / NET: 265 mL        PE    NAD  RLE:   dressing C/D/I  motor intact GS/TA/EHL  SILT S/S/SP/DP  WWP                          10.7   8.50  )-----------( 170      ( 2019 08:46 )             34.7     06-23    140  |  107  |  24<H>  ----------------------------<  168<H>  4.8   |  21<L>  |  1.41<H>    Ca    8.8      2019 07:00        Urinalysis Basic - ( 2019 14:54 )    Color: Yellow / Appearance: Clear / S.021 / pH: x  Gluc: x / Ketone: Negative  / Bili: Negative / Urobili: <2 mg/dL   Blood: x / Protein: Trace / Nitrite: Negative   Leuk Esterase: Moderate / RBC: 2 /HPF / WBC 6 /HPF   Sq Epi: x / Non Sq Epi: 3 /HPF / Bacteria: Negative        89y Female s/p R hip elia  - Pain control  - WBAT  - Posterior dislocation precautions  - PT/OT/OOB  - DVT ppx - eliquis  - Dispo planning

## 2019-06-24 NOTE — CONSULT NOTE ADULT - SUBJECTIVE AND OBJECTIVE BOX
HPI:  Pt is a 89 yo F with a PMH of HTN, HLD, A-Fib (on Eliquis) and Polio (w/ residual LLE 1/5 strength) who is admitted for right femoral neck fracture after a slip and fall. Neurology consulted for new onset right facial droop and RUE weakness. LKN 6/23 at bedtime. Pt also notes decreased sensation on the right side of the face and right arm. Pt denies and head trauma, LOC, shaking or urine/bowel loss with the fall. Pt denies any prior such episodes or any recent fevers, chills, HA, dizziness, tingling, speech changes. NIHSS: 6. MRS: 4.      MEDICATIONS  (STANDING):  acetaminophen   Tablet .. 975 milliGRAM(s) Oral every 8 hours  amLODIPine   Tablet 5 milliGRAM(s) Oral daily  docusate sodium 100 milliGRAM(s) Oral three times a day  lidocaine   Patch 1 Patch Transdermal every 24 hours  losartan 100 milliGRAM(s) Oral daily  oxybutynin 5 milliGRAM(s) Oral daily  pantoprazole    Tablet 40 milliGRAM(s) Oral before breakfast  polyethylene glycol 3350 17 Gram(s) Oral daily  senna 2 Tablet(s) Oral at bedtime  simvastatin 20 milliGRAM(s) Oral at bedtime    MEDICATIONS  (PRN):  bisacodyl Suppository 10 milliGRAM(s) Rectal daily PRN If no bowel movement  magnesium hydroxide Suspension 30 milliLiter(s) Oral daily PRN Constipation  mirtazapine 15 milliGRAM(s) Oral at bedtime PRN Insomnia  ondansetron Injectable 4 milliGRAM(s) IV Push every 6 hours PRN Nausea and/or Vomiting  traMADol 25 milliGRAM(s) Oral every 4 hours PRN Moderate Pain (4 - 6)  traMADol 50 milliGRAM(s) Oral every 4 hours PRN Severe Pain (7 - 10)    PAST MEDICAL & SURGICAL HISTORY:  Pleural effusion on left  Splenic abscess  Polio  High cholesterol  Hypertension  History of appendectomy    FAMILY HISTORY:  No pertinent family history in first degree relatives    Allergies    No Known Allergies    Intolerances    SHx - No smoking, No ETOH, No drug abuse    Review of Systems:  See HPI.    Vital Signs Last 24 Hrs  T(C): 36.8 (24 Jun 2019 13:52), Max: 37.6 (23 Jun 2019 20:22)  T(F): 98.3 (24 Jun 2019 13:52), Max: 99.7 (23 Jun 2019 20:22)  HR: 51 (24 Jun 2019 13:52) (51 - 82)  BP: 122/53 (24 Jun 2019 13:52) (95/51 - 122/58)  BP(mean): --  RR: 18 (24 Jun 2019 13:52) (18 - 18)  SpO2: 95% (24 Jun 2019 13:52) (93% - 97%)      General Exam:   General appearance: No acute distress    Neurological Exam:  Mental Status: Orientated to self, date and place.  Attention intact.  No dysarthria. Speech fluent.  Cranial Nerves: PERRL, EOMI, VFF, no nystagmus. Decreased sensation to LT in the left CN V1-3. Nasolabial flattening on the left. Hearing intact to finger rub bilaterally.  Tongue, uvula and palate midline.  Sternocleidomastoid and Trapezius intact bilaterally.    Motor:   Tone: normal.                  Strength:     [] Upper extremity                      Delt       Bicep    Tricep                                                  R         5/5        5/5        5/5       5/5                                               L          4/5        4/5        4/5       4/5  [] Lower extremity                       HF          KE          KF        DF         PF                                               R        1/5        1/5        1/5       1/5       1/5                                               L         5/5        5/5       5/5       5/5        5/5  Dysmetria: None to finger-nose-finger b/l (but weakness limited in the LUE)  No truncal ataxia.    Tremor: No resting, postural or action tremor.  No myoclonus.    Sensation: decreased to light touch in the LUE, no extinction    Deep Tendon Reflexes:              Biceps          BR        Patellar        Ankle       Babinski                                         R       2+               2+                                         upgoing                                         L        2+               2+        2+               0             upgoing      06-23    140  |  107  |  24<H>  ----------------------------<  168<H>  4.8   |  21<L>  |  1.41<H>    Ca    8.8      23 Jun 2019 07:00               10.7   8.50  )-----------( 170      ( 23 Jun 2019 08:46 )             34.7

## 2019-06-24 NOTE — PROVIDER CONTACT NOTE (OTHER) - ASSESSMENT
pt a&ox4, pt was [facility] from [address] [place] [address] [facility] made aware, stated that she will give report to NP, pt resting in bed comfortably,

## 2019-06-24 NOTE — DISCHARGE NOTE PROVIDER - CARE PROVIDER_API CALL
Jeff Muhammad)  Orthopaedic Surgery  [address] [facility] [address]  [place] [address]  Phone: [phone]  Fax: [phone]  Follow Up Time: Jeff Muhammad)  Orthopaedic Surgery  [address] [facility], [address]  [place] [address]  Phone: [phone]  Fax: [phone]  Follow Up Time:     Rosy Al)  Surgery; Surgical Critical Care; Vascular Surgery  [address] [facility], [address]  [place] [address]  Phone: [phone]  Fax: [phone]  Follow Up Time: 2 weeks

## 2019-06-24 NOTE — DISCHARGE NOTE PROVIDER - HOSPITAL COURSE
89y female with PMH of hypertension, afib, hyperlipidemia, and polio presented to [facility] ED on 6/21 s/p mech fall onto R hip. Patient was admitted and underwent a R hip hemiarthroplasty with Dr. Muhammad on 6/22. Patient underwent procedure without complication. PT/OT evaluated patient and recommended subacute rehab for discharge. Rest of hospital stay unremarkable.        Home Medications:     * Patient Currently Takes Medications as of 18-Nov-2016 11:11 documented in Structured Notes    · 	losartan 100 mg oral tablet: 1 tab(s) orally once a day    · 	mirtazapine 15 mg oral tablet: 1 tab(s) orally once a day (at bedtime), As needed, insomnia    · 	simvastatin 20 mg oral tablet: 1 tab(s) orally once a day (at bedtime)    · 	amLODIPine 5 mg oral tablet: 1 tab(s) orally once a day    · 	pantoprazole 40 mg oral delayed release tablet: 1 tab(s) orally once a day (before a meal) 89 y/o Female who presents with a chief complaint of hip fracture.  s/p mech fall in the kitchen onto R hip when right leg gave way, now c/o severe right hip pain and inability to ambulate.  Patient denies headstrike or LOC. Localizes pain to right hip/femur/knee. Patient denies radiation of pain. Patient denies numbness/tingling/burning in the RLE. No other bone/joint complaints. Patient is a community ambulator at baseline with a rolling walker. Patient has no issues w/ ADLs/IADLs. The patient underwent a right hemiarthroplasty ORIF surgery on 6/22/2019. The patient is now postop, feeling well and complaining of moderate right hip incisional pain. When last seen on the morning of 6/23/19, she is wanted to be out of bed to chair and begin ambulation. She had an uneventful day until 6:30 PM when she noted weakness of her left hand and altered ability to move her fingers.  Her daughter noticed facial weakness and slurring of words.  No staff was notified and the patient remained this way until next examined by physical therapy on the morning of 6/24/19.  She was found to have  significant left hemiparesis of the left arm greater than left leg, with facial droop and difficulty articulating.  The patient was alert and oriented x3 and she answered questions appropriately.  She had clear weakness in the left arm and left leg as compared to the day before.  EKG was obtained and she was in normal sinus rhythm,  with no ST-T wave changes. Blood pressure was low between 100- 120 systolic.  Blood pressure medications were held, unless blood pressure was greater than 130 systolic.  The patient had a neurological consultation and  right brain CVA was suspected.  The patient underwent CT of the brain at this time showed no hemorrhages or masses.  There was no hydrocephalus.  Possibility of infarction was present and an MRI was advised.  After the CT scan of the brain the patient received 325 mg of aspirin.  It was now approximately 16 hours after the onset of left hemiparesis.  The patient was transferred to a monitored bed to rule out  episodic paroxysmal atrial fibrillation that could have caused her  acute CVA.  The patient had previously been on 2.5 mg Eliquis twice a day and dosage wasnow increased to 5 mg twice a day for therapeutic anticoagulation.  Her rhythm has remained  NSR with a pulse of 64.  The patient has no pain and with the exception of left-sided weakness, has no changes in her condition.  She complains bitterly of right leg postoperative pain  The patient has no new medical complaints at this time. 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  The patient and daughter were fully  informed of right carotid critical stenosis. Telemetry continues with NSR  and no arrhythmias. Patient seen resting comfortably. speech is minimally slurred and Patient is moving all extremities    Patient and vascular have decided to do rehab before undergoing carotid endarterectomy.     Pt received 1U PRBC on 7/2 for Hgb: 6.5.     Patient is otherwise medically stable for discharge.

## 2019-06-24 NOTE — DISCHARGE NOTE PROVIDER - PROVIDER TOKENS
PROVIDER:[TOKEN:[[id number]]] PROVIDER:[TOKEN:[[id number]]],PROVIDER:[TOKEN:[[id number]],FOLLOWUP:[2 weeks]]

## 2019-06-24 NOTE — PROVIDER CONTACT NOTE (OTHER) - ASSESSMENT
AOx4, 98.6, HR 75, 95/61, RR 18 96% 2L O2. No c/o dizziness, SOB, chest pain. Pt sleeping comfortably in bed.

## 2019-06-24 NOTE — CONSULT NOTE ADULT - ATTENDING COMMENTS
VASCULAR NEUROLOGY ATTENDING  The patient is seen and examined the history and imaging are reviewed. I agree with the resident note unless otherwise noted. Patient appears at her neurologic baseline. Low suspicion for cerebrovascular event. Continue AC. Outpatient neurology follow-up.

## 2019-06-24 NOTE — PROGRESS NOTE ADULT - ASSESSMENT
The patient underwent a right hemiarthroplasty ORIF surgery on 6/22/2019. The patient is now postop, feeling well and complaining of moderate right hip incisional pain.  She is anxious to be out of bed to chair and begin ambulation, which will be performed tomorrow morning.   The patient has no new medical complaints at this time. The patient underwent a right hemiarthroplasty ORIF surgery on 6/22/2019. The patient is now postop, feeling well and complaining of moderate right hip incisional pain.  The patient was 24 hours postop, when she developed acute left hemiparesis, arm greater than leg. he extent of her stroke and etiology has yet to be determined.

## 2019-06-24 NOTE — DISCHARGE NOTE PROVIDER - NSDCACTIVITY_GEN_ALL_CORE
Do not make important decisions/No heavy lifting/straining/Walking - Outdoors allowed/Do not drive or operate machinery

## 2019-06-24 NOTE — DISCHARGE NOTE PROVIDER - NSDCCAREPROVSEEN_GEN_ALL_CORE_FT
Jeff Muhammad Jeff Muhammad  [facility] Medicine, [facility] PracticeFormerly [facility], [facility] Surgery, Vascular  [facility] Trauma Surgery, Team

## 2019-06-24 NOTE — PROGRESS NOTE ADULT - ATTENDING COMMENTS
No medical complications post-op to date and to proceed with physical therapy, as tolerated. Continues pulmonary toilet to lessen atelectasis, leg exercises as taught to lessen the risk of DVT and supervised pain medications for post-op pain control. The patient suffered an acute right CVA 24, hours postop. The patient is now transferred to the medical service for cardiac monitoring and full neurological evaluation.  Patient received 325 mg of aspirin and Eliquis is increased to 5 mg twice a day.

## 2019-06-24 NOTE — PROVIDER CONTACT NOTE (OTHER) - SITUATION
Pt. C/O weakness/numbness on L arm when trying to get OOB with Pt today. According to Pt. daughter Pt experiencing these symptoms 6/23 post op but stated she didn't notify RN/provider Pt. C/O weakness/numbness on L arm when trying to get OOB with Pt today. According to Pt's. daughter Pt experiencing these symptoms 6/23 post op

## 2019-06-24 NOTE — CHART NOTE - NSCHARTNOTEFT_GEN_A_CORE
Called by RN for patient who arrived to floor with fever of 101.5.  Patient non-diaphoretic and non-toxic appearing.  Denies chills, sore throat, cough, diarrhea, and dysuria.  Spoke with Dr. Barraza, suspects post-op atelectasis.  CXR, urinalysis (will send urine culture if positive), blood cultures, and pulmonary toileting ordered as discussed.  Will hold off on antibiotics pending workup - will give Tylenol now for fever.    Sadaf Melendez Np  [phone] Called by RN for patient who arrived to floor with fever of 101.5 - vitals otherwise stable.  Patient non-diaphoretic and non-toxic appearing.  Denies chills, sore throat, cough, diarrhea, and dysuria.  Spoke with Dr. Barraza, suspects post-op atelectasis.  CXR, urinalysis (will send urine culture if positive), blood cultures, and pulmonary toileting ordered as discussed.  Will hold off on antibiotics pending workup - will give Tylenol now for fever.    Sadaf Melendez, PARISH  [phone]

## 2019-06-24 NOTE — DISCHARGE NOTE PROVIDER - NSDCCPCAREPLAN_GEN_ALL_CORE_FT
PRINCIPAL DISCHARGE DIAGNOSIS  Diagnosis: Hip fracture  Assessment and Plan of Treatment: PRINCIPAL DISCHARGE DIAGNOSIS  Diagnosis: Hip fracture  Assessment and Plan of Treatment: s/p R hemiarthroplasty on 6/22.  Follow up with Dr. Muhammad in 1-2 weeks following discharge      SECONDARY DISCHARGE DIAGNOSES  Diagnosis: Carotid stenosis  Assessment and Plan of Treatment: Proceed with carotid endarterectomy as an outpatient after discharge to rehab to build strength and increase independence/functionality.    Diagnosis: CVA (cerebral vascular accident)  Assessment and Plan of Treatment: Continue with Eliquis and Aspirin.   You had an ischemic stroke which is a clot in the vessels of the brain that decreases the oxygenation to the brain which can cause brain death. It can cause major deficits. Early warning signs of stroke include slurred speech or trouble speaking, one sided weakness, paralysis or numbness of the face, arm, or leg,  facial droop, instability, sudden or temporary vision loss, mental confusion, headache, and/or inability to understand. Detection of a stroke and timing is very important as there is a short amount of time allotted for certain interventions to minimize deficits. Follow up with your neurologist within 1-2 weeks of discharge. Continue to take all your medications as prescribed by your doctor. If you experience any of the above mentioned symptoms call 911 and/or return to the emergency room.      Diagnosis: Hypertension  Assessment and Plan of Treatment: You have high blood pressure which puts you at risk for heart attack and stroke.   Take all blood pressure medication as prescribed by your doctor.   Follow up with your medical doctor for routine blood pressure monitoring at your next visit.  Notify your doctor if you have any of the following symptoms:   Dizziness, Lightheadedness, Blurry vision, Headache, Chest pain, Shortness of breath.   Continue to follow a low salt and low cholesterol diet with limited added salt. Avoid fatty foods such as cured meats - becerra, ham, prosciutto, fried foods, canned soups/broths, and frozen dinners.

## 2019-06-25 ENCOUNTER — TRANSCRIPTION ENCOUNTER (OUTPATIENT)
Age: 84
End: 2019-06-25

## 2019-06-25 LAB
ANION GAP SERPL CALC-SCNC: 12 MMOL/L — SIGNIFICANT CHANGE UP (ref 5–17)
APPEARANCE UR: CLEAR — SIGNIFICANT CHANGE UP
BACTERIA # UR AUTO: NEGATIVE — SIGNIFICANT CHANGE UP
BILIRUB UR-MCNC: NEGATIVE — SIGNIFICANT CHANGE UP
BUN SERPL-MCNC: 28 MG/DL — HIGH (ref 7–23)
CALCIUM SERPL-MCNC: 9 MG/DL — SIGNIFICANT CHANGE UP (ref 8.4–10.5)
CHLORIDE SERPL-SCNC: 106 MMOL/L — SIGNIFICANT CHANGE UP (ref 96–108)
CO2 SERPL-SCNC: 23 MMOL/L — SIGNIFICANT CHANGE UP (ref 22–31)
COLOR SPEC: SIGNIFICANT CHANGE UP
CREAT SERPL-MCNC: 1.34 MG/DL — HIGH (ref 0.5–1.3)
DIFF PNL FLD: NEGATIVE — SIGNIFICANT CHANGE UP
EPI CELLS # UR: 1 /HPF — SIGNIFICANT CHANGE UP
GLUCOSE SERPL-MCNC: 107 MG/DL — HIGH (ref 70–99)
GLUCOSE UR QL: NEGATIVE — SIGNIFICANT CHANGE UP
HCT VFR BLD CALC: 28.5 % — LOW (ref 34.5–45)
HGB BLD-MCNC: 8.7 G/DL — LOW (ref 11.5–15.5)
HYALINE CASTS # UR AUTO: 0 /LPF — SIGNIFICANT CHANGE UP (ref 0–2)
KETONES UR-MCNC: NEGATIVE — SIGNIFICANT CHANGE UP
LEUKOCYTE ESTERASE UR-ACNC: NEGATIVE — SIGNIFICANT CHANGE UP
MCHC RBC-ENTMCNC: 30.3 PG — SIGNIFICANT CHANGE UP (ref 27–34)
MCHC RBC-ENTMCNC: 30.5 GM/DL — LOW (ref 32–36)
MCV RBC AUTO: 99.3 FL — SIGNIFICANT CHANGE UP (ref 80–100)
NITRITE UR-MCNC: NEGATIVE — SIGNIFICANT CHANGE UP
PH UR: 5.5 — SIGNIFICANT CHANGE UP (ref 5–8)
PLATELET # BLD AUTO: 161 K/UL — SIGNIFICANT CHANGE UP (ref 150–400)
POTASSIUM SERPL-MCNC: 4.4 MMOL/L — SIGNIFICANT CHANGE UP (ref 3.5–5.3)
POTASSIUM SERPL-SCNC: 4.4 MMOL/L — SIGNIFICANT CHANGE UP (ref 3.5–5.3)
PROT UR-MCNC: SIGNIFICANT CHANGE UP
RBC # BLD: 2.87 M/UL — LOW (ref 3.8–5.2)
RBC # FLD: 13.3 % — SIGNIFICANT CHANGE UP (ref 10.3–14.5)
RBC CASTS # UR COMP ASSIST: 1 /HPF — SIGNIFICANT CHANGE UP (ref 0–4)
SODIUM SERPL-SCNC: 141 MMOL/L — SIGNIFICANT CHANGE UP (ref 135–145)
SP GR SPEC: 1.01 — SIGNIFICANT CHANGE UP (ref 1.01–1.02)
UROBILINOGEN FLD QL: NEGATIVE — SIGNIFICANT CHANGE UP
WBC # BLD: 7.52 K/UL — SIGNIFICANT CHANGE UP (ref 3.8–10.5)
WBC # FLD AUTO: 7.52 K/UL — SIGNIFICANT CHANGE UP (ref 3.8–10.5)
WBC UR QL: 1 /HPF — SIGNIFICANT CHANGE UP (ref 0–5)

## 2019-06-25 PROCEDURE — 93306 TTE W/DOPPLER COMPLETE: CPT | Mod: 26

## 2019-06-25 PROCEDURE — 93970 EXTREMITY STUDY: CPT | Mod: 26

## 2019-06-25 PROCEDURE — 93880 EXTRACRANIAL BILAT STUDY: CPT | Mod: 26

## 2019-06-25 RX ADMIN — Medication 975 MILLIGRAM(S): at 21:54

## 2019-06-25 RX ADMIN — APIXABAN 5 MILLIGRAM(S): 2.5 TABLET, FILM COATED ORAL at 19:48

## 2019-06-25 RX ADMIN — Medication 975 MILLIGRAM(S): at 06:21

## 2019-06-25 RX ADMIN — Medication 975 MILLIGRAM(S): at 14:23

## 2019-06-25 RX ADMIN — Medication 5 MILLIGRAM(S): at 14:23

## 2019-06-25 RX ADMIN — POLYETHYLENE GLYCOL 3350 17 GRAM(S): 17 POWDER, FOR SOLUTION ORAL at 14:23

## 2019-06-25 RX ADMIN — LIDOCAINE 1 PATCH: 4 CREAM TOPICAL at 19:37

## 2019-06-25 RX ADMIN — LIDOCAINE 1 PATCH: 4 CREAM TOPICAL at 18:20

## 2019-06-25 RX ADMIN — Medication 100 MILLIGRAM(S): at 05:15

## 2019-06-25 RX ADMIN — Medication 975 MILLIGRAM(S): at 15:21

## 2019-06-25 RX ADMIN — LIDOCAINE 1 PATCH: 4 CREAM TOPICAL at 14:23

## 2019-06-25 RX ADMIN — APIXABAN 5 MILLIGRAM(S): 2.5 TABLET, FILM COATED ORAL at 05:15

## 2019-06-25 RX ADMIN — SIMVASTATIN 20 MILLIGRAM(S): 20 TABLET, FILM COATED ORAL at 21:11

## 2019-06-25 RX ADMIN — Medication 100 MILLIGRAM(S): at 14:23

## 2019-06-25 RX ADMIN — Medication 975 MILLIGRAM(S): at 05:14

## 2019-06-25 RX ADMIN — SENNA PLUS 2 TABLET(S): 8.6 TABLET ORAL at 21:10

## 2019-06-25 RX ADMIN — PANTOPRAZOLE SODIUM 40 MILLIGRAM(S): 20 TABLET, DELAYED RELEASE ORAL at 05:15

## 2019-06-25 RX ADMIN — Medication 10 MILLIGRAM(S): at 14:24

## 2019-06-25 RX ADMIN — LIDOCAINE 1 PATCH: 4 CREAM TOPICAL at 00:02

## 2019-06-25 RX ADMIN — Medication 975 MILLIGRAM(S): at 21:10

## 2019-06-25 RX ADMIN — Medication 100 MILLIGRAM(S): at 21:10

## 2019-06-25 RX ADMIN — Medication 325 MILLIGRAM(S): at 14:22

## 2019-06-25 NOTE — CHART NOTE - NSCHARTNOTEFT_GEN_A_CORE
Notified by attending patient with + carotid doppler, > 90% stenosis on right side, instructed to call vascular surgery consult, -- > #[phone], d/w Resident Rik, will see patient, will f/u recommendations.     Winston Mcleod PA-C   Department of Medicine

## 2019-06-25 NOTE — PROGRESS NOTE ADULT - ASSESSMENT
A/P: 89 y/o F POD# 2 S/P R Willam    DVT ppx - Eliquis  RLE WBAT  PT  Pain management prn  FU AM labs  FU MRI head read  FU carotid dopplers  FU echo  Dispo - SARAH planning

## 2019-06-25 NOTE — PROGRESS NOTE ADULT - SUBJECTIVE AND OBJECTIVE BOX
Patient is a 89y old  Female who presents with a chief complaint of hip fracture.  s/p mech fall in the kitchen onto R hip when right leg gave way, now c/o severe right hip pain and inability to ambulate.  Patient denies headstrike or LOC. Localizes pain to right hip/femur/knee. Patient denies radiation of pain. Patient denies numbness/tingling/burning in the RLE. No other bone/joint complaints. Patient is a community ambulator at baseline with a rolling walker. Patient has no issues w/ ADLs/IADLs. The patient underwent a right hemiarthroplasty ORIF surgery on 2019. The patient is now postop, feeling well and complaining of moderate right hip incisional pain. When last seen on the morning of 19, she is wanted to be out of bed to chair and begin ambulation. She had an uneventful day until 6:30 PM when she noted weakness of her left hand and altered ability to move her fingers.  Her daughter noticed facial weakness and slurring of words.  No staff was notified and the patient remained this way until next examined by physical therapy on the morning of 19.  She was found to have  significant left hemiparesis of the left arm greater than left leg, with facial droop and difficulty articulating.  The patient was alert and oriented x3 and she answered questions appropriately.  She had clear weakness in the left arm and left leg as compared to the day before.  EKG was obtained and she was in normal sinus rhythm,  with no ST-T wave changes. Blood pressure was low between 100- 120 systolic.  Blood pressure medications were held, unless blood pressure was greater than 130 systolic.  The patient had a neurological consultation and  right brain CVA was suspected.  The patient underwent CT of the brain at this time showed no hemorrhages or masses.  There was no hydrocephalus.  Possibility of infarction was present and an MRI was advised.  After the CT scan of the brain the patient received 325 mg of aspirin.  It was now approximately 16 hours after the onset of left hemiparesis.  The patient was transferred to a monitored bed to rule out  episodic paroxysmal atrial fibrillation that could have caused her  acute CVA.  The patient had previously been on 2.5 mg Eliquis twice a day and dosage wasnow increased to 5 mg twice a day for therapeutic anticoagulation.  Her rhythm has remained  NSR with a pulse of 64.  The patient has no pain and with the exception of left-sided weakness, has no changes in her condition.  She complains bitterly of right leg postoperative pain  The patient has no new medical complaints at this time. katie was submitted for MRI of the brain, carotid Doppler study, echocardiogram, and venous Doppler of both legs.  The daughter was present during the entire time that service was rendered.        MEDICATIONS  (STANDING):  acetaminophen   Tablet .. 975 milliGRAM(s) Oral every 8 hours  amLODIPine   Tablet 5 milliGRAM(s) Oral daily  apixaban 2.5 milliGRAM(s) Oral every 12 hours  ceFAZolin   IVPB 2000 milliGRAM(s) IV Intermittent every 8 hours  docusate sodium 100 milliGRAM(s) Oral three times a day  losartan 100 milliGRAM(s) Oral daily  pantoprazole    Tablet 40 milliGRAM(s) Oral before breakfast  polyethylene glycol 3350 17 Gram(s) Oral daily  senna 2 Tablet(s) Oral at bedtime  simvastatin 20 milliGRAM(s) Oral at bedtime  sodium chloride 0.9%. 1000 milliLiter(s) (125 mL/Hr) IV Continuous <Continuous>    MEDICATIONS  (PRN):  magnesium hydroxide Suspension 30 milliLiter(s) Oral daily PRN Constipation  mirtazapine 15 milliGRAM(s) Oral at bedtime PRN Insomnia  morphine  - Injectable 1 milliGRAM(s) IV Push every 2 hours PRN Severe Pain (7 - 10)  morphine  - Injectable 2 milliGRAM(s) IV Push every 4 hours PRN severe breakthrough pain  ondansetron Injectable 4 milliGRAM(s) IV Push every 6 hours PRN Nausea and/or Vomiting  oxyCODONE    IR 2.5 milliGRAM(s) Oral every 4 hours PRN Moderate Pain (4 - 6)  oxyCODONE    IR 5 milliGRAM(s) Oral every 4 hours PRN Severe Pain (7 - 10)      Allergies    No Known Allergies    Intolerances      VITALS:   T(C): 37.4 (19 @ 08:40), Max: 37.4 (19 @ 08:40)  HR: 75 (19 @ 08:40) (64 - 92)  BP: 108/48 (19 @ 08:40) (105/56 - 148/68)  RR: 18 (19 @ 08:40) (16 - 18)  SpO2: 95% (19 @ 08:40) (93% - 98%)  Wt(kg): --    PHYSICAL EXAM:  GENERAL: NAD, well nourished and conversant  HEAD:  Atraumatic  EYES: EOM, PERRLA, conjunctiva pink and sclera white  ENT: No tonsillar erythema, exudates, or enlargement, moist mucous membranes, good dentition, no lesions  NECK: Supple, No JVD, normal thyroid, carotids with normal upstrokes and no bruits  CHEST/LUNG: Clear to auscultation bilaterally, No rales, rhonchi, wheezing, or rubs  HEART: Regular rate and rhythm, No murmurs, rubs, or gallops  ABDOMEN: Soft, nondistended, no masses, guarding, tenderness or rebound, bowel sounds present  EXTREMITIES:  2+ Peripheral Pulses, No clubbing, cyanosis, or edema. No arthritis of shoulders, elbows, hands, hips, knees, ankles, or feet. No DJD C spine, T spine, or L/S spine  LYMPH: No lymphadenopathy noted  SKIN: No rashes or lesions  NERVOUS SYSTEM:  Alert & Oriented X3, normal cognitive function. Motor Strength 5/5 right upper and right lower.  5/5 left upper and left lower extremities, DTRs 2+ intact and symmetric    LABS:        CBC Full  -  ( 2019 08:46 )  WBC Count : 8.50 K/uL  RBC Count : 3.54 M/uL  Hemoglobin : 10.7 g/dL  Hematocrit : 34.7 %  Platelet Count - Automated : 170 K/uL  Mean Cell Volume : 98.0 fl  Mean Cell Hemoglobin : 30.2 pg  Mean Cell Hemoglobin Concentration : 30.8 gm/dL  Auto Neutrophil # : 7.16 K/uL  Auto Lymphocyte # : 0.80 K/uL  Auto Monocyte # : 0.32 K/uL  Auto Eosinophil # : 0.16 K/uL  Auto Basophil # : 0.03 K/uL  Auto Neutrophil % : 84.1 %  Auto Lymphocyte % : 9.4 %  Auto Monocyte % : 3.8 %  Auto Eosinophil % : 1.9 %  Auto Basophil % : 0.4 %        140  |  107  |  24<H>  ----------------------------<  168<H>  4.8   |  21<L>  |  1.41<H>    Ca    8.8      2019 07:00    TPro  x   /  Alb  3.9  /  TBili  x   /  DBili  x   /  AST  x   /  ALT  x   /  AlkPhos  x   06-22    LIVER FUNCTIONS - ( 2019 05:58 )  Alb: 3.9 g/dL / Pro: x     / ALK PHOS: x     / ALT: x     / AST: x     / GGT: x           PT/INR - ( 2019 05:58 )   PT: 13.0 sec;   INR: 1.14 ratio         PTT - ( 2019 05:58 )  PTT:33.9 sec  Urinalysis Basic - ( 2019 14:54 )    Color: Yellow / Appearance: Clear / S.021 / pH: x  Gluc: x / Ketone: Negative  / Bili: Negative / Urobili: <2 mg/dL   Blood: x / Protein: Trace / Nitrite: Negative   Leuk Esterase: Moderate / RBC: 2 /HPF / WBC 6 /HPF   Sq Epi: x / Non Sq Epi: 3 /HPF / Bacteria: Negative      CAPILLARY BLOOD GLUCOSE          RADIOLOGY & ADDITIONAL TESTS:      Consultant(s):    Care Discussed with Consultants/Other Providers [ ] YES  [ ] NO Patient is a 89y old  Female who presents with a chief complaint of hip fracture.  s/p mech fall in the kitchen onto R hip when right leg gave way, now c/o severe right hip pain and inability to ambulate.  Patient denies headstrike or LOC. Localizes pain to right hip/femur/knee. Patient denies radiation of pain. Patient denies numbness/tingling/burning in the RLE. No other bone/joint complaints. Patient is a community ambulator at baseline with a rolling walker. Patient has no issues w/ ADLs/IADLs. The patient underwent a right hemiarthroplasty ORIF surgery on 2019. The patient is now postop, feeling well and complaining of moderate right hip incisional pain. When last seen on the morning of 19, she is wanted to be out of bed to chair and begin ambulation. She had an uneventful day until 6:30 PM when she noted weakness of her left hand and altered ability to move her fingers.  Her daughter noticed facial weakness and slurring of words.  No staff was notified and the patient remained this way until next examined by physical therapy on the morning of 19.  She was found to have  significant left hemiparesis of the left arm greater than left leg, with facial droop and difficulty articulating.  The patient was alert and oriented x3 and she answered questions appropriately.  She had clear weakness in the left arm and left leg as compared to the day before.  EKG was obtained and she was in normal sinus rhythm,  with no ST-T wave changes. Blood pressure was low between 100- 120 systolic.  Blood pressure medications were held, unless blood pressure was greater than 130 systolic.  The patient had a neurological consultation and  right brain CVA was suspected.  The patient underwent CT of the brain at this time showed no hemorrhages or masses.  There was no hydrocephalus.  Possibility of infarction was present and an MRI was advised.  After the CT scan of the brain the patient received 325 mg of aspirin.  It was now approximately 16 hours after the onset of left hemiparesis.  The patient was transferred to a monitored bed to rule out  episodic paroxysmal atrial fibrillation that could have caused her  acute CVA.  The patient had previously been on 2.5 mg Eliquis twice a day and dosage wasnow increased to 5 mg twice a day for therapeutic anticoagulation.  Her rhythm has remained  NSR with a pulse of 64.  The patient has no pain and with the exception of left-sided weakness, has no changes in her condition.  She complains bitterly of right leg postoperative pain  The patient has no new medical complaints at this time. katie was submitted for MRI of the brain, carotid Doppler study, echocardiogram, and venous Doppler of both legs.  The daughter was present during the entire time that service was rendered.        MEDICATIONS  (STANDING):  acetaminophen   Tablet .. 975 milliGRAM(s) Oral every 8 hours  amLODIPine   Tablet 5 milliGRAM(s) Oral daily  apixaban 5 milliGRAM(s) Oral two times a day  aspirin 325 milliGRAM(s) Oral daily  docusate sodium 100 milliGRAM(s) Oral three times a day  lidocaine   Patch 1 Patch Transdermal every 24 hours  losartan 100 milliGRAM(s) Oral daily  oxybutynin 5 milliGRAM(s) Oral daily  pantoprazole    Tablet 40 milliGRAM(s) Oral before breakfast  polyethylene glycol 3350 17 Gram(s) Oral daily  senna 2 Tablet(s) Oral at bedtime  simvastatin 20 milliGRAM(s) Oral at bedtime    MEDICATIONS  (PRN):  bisacodyl Suppository 10 milliGRAM(s) Rectal daily PRN If no bowel movement  magnesium hydroxide Suspension 30 milliLiter(s) Oral daily PRN Constipation  mirtazapine 15 milliGRAM(s) Oral at bedtime PRN Insomnia  ondansetron Injectable 4 milliGRAM(s) IV Push every 6 hours PRN Nausea and/or Vomiting  traMADol 25 milliGRAM(s) Oral every 4 hours PRN Moderate Pain (4 - 6)  traMADol 50 milliGRAM(s) Oral every 4 hours PRN Severe Pain (7 - 10)        Allergies    No Known Allergies    Intolerances      Vital Signs Last 24 Hrs  T(C): 36.9 (2019 11:49), Max: 38.6 (2019 18:23)  T(F): 98.4 (2019 11:49), Max: 101.5 (2019 18:23)  HR: 72 (2019 11:49) (51 - 95)  BP: 124/67 (2019 11:49) (94/53 - 124/67)  BP(mean): --  RR: 18 (2019 11:49) (18 - 18)  SpO2: 96% (2019 11:49) (89% - 97%)    PHYSICAL EXAM:  GENERAL: NAD, well nourished and conversant  HEAD:  Atraumatic  EYES: EOM, PERRLA, conjunctiva pink and sclera white  ENT: No tonsillar erythema, exudates, or enlargement, moist mucous membranes, good dentition, no lesions  NECK: Supple, No JVD, normal thyroid, carotids with normal upstrokes and no bruits  CHEST/LUNG: Clear to auscultation bilaterally, No rales, rhonchi, wheezing, or rubs  HEART: Regular rate and rhythm, No murmurs, rubs, or gallops  ABDOMEN: Soft, nondistended, no masses, guarding, tenderness or rebound, bowel sounds present  EXTREMITIES:  2+ Peripheral Pulses, No clubbing, cyanosis, or edema. No arthritis of shoulders, elbows, hands, hips, knees, ankles, or feet. No DJD C spine, T spine, or L/S spine  LYMPH: No lymphadenopathy noted  SKIN: No rashes or lesions  NERVOUS SYSTEM:  Alert & Oriented X3, normal cognitive function. Motor Strength 5/5 right upper and right lower.  5/5 left upper and left lower extremities, DTRs 2+ intact and symmetric          CBC Full  -  ( 2019 10:04 )  WBC Count : 7.52 K/uL  RBC Count : 2.87 M/uL  Hemoglobin : 8.7 g/dL  Hematocrit : 28.5 %  Platelet Count - Automated : 161 K/uL  Mean Cell Volume : 99.3 fl  Mean Cell Hemoglobin : 30.3 pg  Mean Cell Hemoglobin Concentration : 30.5 gm/dL  Auto Neutrophil # : x  Auto Lymphocyte # : x  Auto Monocyte # : x  Auto Eosinophil # : x  Auto Basophil # : x  Auto Neutrophil % : x  Auto Lymphocyte % : x  Auto Monocyte % : x  Auto Eosinophil % : x  Auto Basophil % : x    06-25    141  |  106  |  28<H>  ----------------------------<  107<H>  4.4   |  23  |  1.34<H>    Ca    9.0      2019 07:01          Urinalysis Basic - ( 2019 00:30 )    Color: Light Yellow / Appearance: Clear / S.013 / pH: x  Gluc: x / Ketone: Negative  / Bili: Negative / Urobili: Negative   Blood: x / Protein: Trace / Nitrite: Negative   Leuk Esterase: Negative / RBC: 1 /hpf / WBC 1 /HPF   Sq Epi: x / Non Sq Epi: 1 /hpf / Bacteria: Negative                  RADIOLOGY & ADDITIONAL TESTS:      Consultant(s):    Care Discussed with Consultants/Other Providers [ ] YES  [ ] NO Patient is a 89y old  Female who presents with a chief complaint of hip fracture.  s/p mech fall in the kitchen onto R hip when right leg gave way, now c/o severe right hip pain and inability to ambulate.  Patient denies headstrike or LOC. Localizes pain to right hip/femur/knee. Patient denies radiation of pain. Patient denies numbness/tingling/burning in the RLE. No other bone/joint complaints. Patient is a community ambulator at baseline with a rolling walker. Patient has no issues w/ ADLs/IADLs. The patient underwent a right hemiarthroplasty ORIF surgery on 2019. The patient is now postop, feeling well and complaining of moderate right hip incisional pain. When last seen on the morning of 19, she is wanted to be out of bed to chair and begin ambulation. She had an uneventful day until 6:30 PM when she noted weakness of her left hand and altered ability to move her fingers.  Her daughter noticed facial weakness and slurring of words.  No staff was notified and the patient remained this way until next examined by physical therapy on the morning of 19.  She was found to have  significant left hemiparesis of the left arm greater than left leg, with facial droop and difficulty articulating.  The patient was alert and oriented x3 and she answered questions appropriately.  She had clear weakness in the left arm and left leg as compared to the day before.  EKG was obtained and she was in normal sinus rhythm,  with no ST-T wave changes. Blood pressure was low between 100- 120 systolic.  Blood pressure medications were held, unless blood pressure was greater than 130 systolic.  The patient had a neurological consultation and  right brain CVA was suspected.  The patient underwent CT of the brain at this time showed no hemorrhages or masses.  There was no hydrocephalus.  Possibility of infarction was present and an MRI was advised.  After the CT scan of the brain the patient received 325 mg of aspirin.  It was now approximately 16 hours after the onset of left hemiparesis.  The patient was transferred to a monitored bed to rule out  episodic paroxysmal atrial fibrillation that could have caused her  acute CVA.  The patient had previously been on 2.5 mg Eliquis twice a day and dosage wasnow increased to 5 mg twice a day for therapeutic anticoagulation.  Her rhythm has remained  NSR with a pulse of 64.  The patient has no pain and with the exception of left-sided weakness, has no changes in her condition.  She complains bitterly of right leg postoperative pain  The patient has no new medical complaints at this time. 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  The patient and daughter were fully  informed of right carotid critical stenosis. Telemetry continues with NSR  and no arrhythmias        MEDICATIONS  (STANDING):  acetaminophen   Tablet .. 975 milliGRAM(s) Oral every 8 hours  amLODIPine   Tablet 5 milliGRAM(s) Oral daily  apixaban 5 milliGRAM(s) Oral two times a day  aspirin 325 milliGRAM(s) Oral daily  docusate sodium 100 milliGRAM(s) Oral three times a day  lidocaine   Patch 1 Patch Transdermal every 24 hours  losartan 100 milliGRAM(s) Oral daily  oxybutynin 5 milliGRAM(s) Oral daily  pantoprazole    Tablet 40 milliGRAM(s) Oral before breakfast  polyethylene glycol 3350 17 Gram(s) Oral daily  senna 2 Tablet(s) Oral at bedtime  simvastatin 20 milliGRAM(s) Oral at bedtime    MEDICATIONS  (PRN):  bisacodyl Suppository 10 milliGRAM(s) Rectal daily PRN If no bowel movement  magnesium hydroxide Suspension 30 milliLiter(s) Oral daily PRN Constipation  mirtazapine 15 milliGRAM(s) Oral at bedtime PRN Insomnia  ondansetron Injectable 4 milliGRAM(s) IV Push every 6 hours PRN Nausea and/or Vomiting  traMADol 25 milliGRAM(s) Oral every 4 hours PRN Moderate Pain (4 - 6)  traMADol 50 milliGRAM(s) Oral every 4 hours PRN Severe Pain (7 - 10)        Allergies    No Known Allergies    Intolerances      Vital Signs Last 24 Hrs  T(C): 36.9 (2019 11:49), Max: 38.6 (2019 18:23)  T(F): 98.4 (2019 11:49), Max: 101.5 (2019 18:23)  HR: 72 (2019 11:49) (51 - 95)  BP: 124/67 (2019 11:49) (94/53 - 124/67)  BP(mean): --  RR: 18 (2019 11:49) (18 - 18)  SpO2: 96% (2019 11:49) (89% - 97%)    PHYSICAL EXAM:  GENERAL: NAD, well nourished and conversant  HEAD:  Atraumatic  EYES: EOM, PERRLA, conjunctiva pink and sclera white  ENT: No tonsillar erythema, exudates, or enlargement, moist mucous membranes, good dentition, no lesions  NECK: Supple, No JVD, normal thyroid, carotids with normal upstrokes and no bruits  CHEST/LUNG: Clear to auscultation bilaterally, No rales, rhonchi, wheezing, or rubs  HEART: Regular rate and rhythm, No murmurs, rubs, or gallops  ABDOMEN: Soft, nondistended, no masses, guarding, tenderness or rebound, bowel sounds present  EXTREMITIES:  2+ Peripheral Pulses, No clubbing, cyanosis, or edema. No arthritis of shoulders, elbows, hands, hips, knees, ankles, or feet. No DJD C spine, T spine, or L/S spine  LYMPH: No lymphadenopathy noted  SKIN: No rashes or lesions  NERVOUS SYSTEM:  Alert & Oriented X3, normal cognitive function. Motor Strength 5/5 right upper and right lower.  5/5 left upper and left lower extremities, DTRs 2+ intact and symmetric          CBC Full  -  ( 2019 10:04 )  WBC Count : 7.52 K/uL  RBC Count : 2.87 M/uL  Hemoglobin : 8.7 g/dL  Hematocrit : 28.5 %  Platelet Count - Automated : 161 K/uL  Mean Cell Volume : 99.3 fl  Mean Cell Hemoglobin : 30.3 pg  Mean Cell Hemoglobin Concentration : 30.5 gm/dL  Auto Neutrophil # : x  Auto Lymphocyte # : x  Auto Monocyte # : x  Auto Eosinophil # : x  Auto Basophil # : x  Auto Neutrophil % : x  Auto Lymphocyte % : x  Auto Monocyte % : x  Auto Eosinophil % : x  Auto Basophil % : x    06-    141  |  106  |  28<H>  ----------------------------<  107<H>  4.4   |  23  |  1.34<H>    Ca    9.0      2019 07:01          Urinalysis Basic - ( 2019 00:30 )    Color: Light Yellow / Appearance: Clear / S.013 / pH: x  Gluc: x / Ketone: Negative  / Bili: Negative / Urobili: Negative   Blood: x / Protein: Trace / Nitrite: Negative   Leuk Esterase: Negative / RBC: 1 /hpf / WBC 1 /HPF   Sq Epi: x / Non Sq Epi: 1 /hpf / Bacteria: Negative                  RADIOLOGY & ADDITIONAL TESTS:      Consultant(s):    Care Discussed with Consultants/Other Providers [ ] YES  [ ] NO

## 2019-06-25 NOTE — DISCHARGE NOTE NURSING/CASE MANAGEMENT/SOCIAL WORK - NSDCDPATPORTLINK_GEN_ALL_CORE
You can access the [facility], offered by , by registering with the following website: [email or web address]

## 2019-06-25 NOTE — PROVIDER CONTACT NOTE (OTHER) - SITUATION
patient failed dysphagia screen post acute CVA. she coughed and had wet voice after 5ml water. refusing all forms of swallow eval, verbalized understanding of risks of aspiration

## 2019-06-25 NOTE — PROGRESS NOTE ADULT - SUBJECTIVE AND OBJECTIVE BOX
Orthopedic Surgery Progress Note     S: Patient seen and examined today. Patient with LUE weakness and numbness yesterday, transferred to tele. Had MRI last night    MEDICATIONS  (STANDING):  acetaminophen   Tablet .. 975 milliGRAM(s) Oral every 8 hours  amLODIPine   Tablet 5 milliGRAM(s) Oral daily  apixaban 5 milliGRAM(s) Oral two times a day  aspirin 325 milliGRAM(s) Oral daily  docusate sodium 100 milliGRAM(s) Oral three times a day  lidocaine   Patch 1 Patch Transdermal every 24 hours  losartan 100 milliGRAM(s) Oral daily  oxybutynin 5 milliGRAM(s) Oral daily  pantoprazole    Tablet 40 milliGRAM(s) Oral before breakfast  polyethylene glycol 3350 17 Gram(s) Oral daily  senna 2 Tablet(s) Oral at bedtime  simvastatin 20 milliGRAM(s) Oral at bedtime    MEDICATIONS  (PRN):  bisacodyl Suppository 10 milliGRAM(s) Rectal daily PRN If no bowel movement  magnesium hydroxide Suspension 30 milliLiter(s) Oral daily PRN Constipation  mirtazapine 15 milliGRAM(s) Oral at bedtime PRN Insomnia  ondansetron Injectable 4 milliGRAM(s) IV Push every 6 hours PRN Nausea and/or Vomiting  traMADol 25 milliGRAM(s) Oral every 4 hours PRN Moderate Pain (4 - 6)  traMADol 50 milliGRAM(s) Oral every 4 hours PRN Severe Pain (7 - 10)      Physical Exam:    Vital Signs Last 24 Hrs  T(C): 36.9 (25 Jun 2019 04:25), Max: 38.6 (24 Jun 2019 18:23)  T(F): 98.5 (25 Jun 2019 04:25), Max: 101.5 (24 Jun 2019 18:23)  HR: 76 (25 Jun 2019 05:36) (51 - 95)  BP: 123/66 (25 Jun 2019 05:36) (94/53 - 123/66)  BP(mean): --  RR: 18 (25 Jun 2019 04:25) (18 - 18)  SpO2: 97% (25 Jun 2019 04:25) (89% - 97%)    06-23-19 @ 07:01  -  06-24-19 @ 07:00  --------------------------------------------------------  IN: 1665 mL / OUT: 1600 mL / NET: 65 mL    06-24-19 @ 07:01  -  06-25-19 @ 06:23  --------------------------------------------------------  IN: 490 mL / OUT: 1950 mL / NET: -1460 mL        NAD  RLE: Aquacel Dressing C/D/I, Dull sensation grossly intact, No hematoma appreciated, Abduction pillow in place,  Toes warm and mobile,  Calves soft, NT    LUE: 4/5 strength, decreased sensation          LABS:                        10.7   8.50  )-----------( 170      ( 23 Jun 2019 08:46 )             34.7     06-23    140  |  107  |  24<H>  ----------------------------<  168<H>  4.8   |  21<L>  |  1.41<H>    Ca    8.8      23 Jun 2019 07:00

## 2019-06-25 NOTE — CONSULT NOTE ADULT - ASSESSMENT
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 Vascular surgery consulted for preliminary duplex showing greater than 70% stenosis of R ICA    - Follow up final read of duplex    [id number]

## 2019-06-25 NOTE — PROGRESS NOTE ADULT - ATTENDING COMMENTS
The patient suffered an acute right CVA 24, hours postop. The patient is now transferred to the medical service for cardiac monitoring and full neurological evaluation.  Patient received 325 mg of aspirin and Eliquis is increased to 5 mg twice a day. The patient suffered an acute right CVA 24, hours postop. Severe right carotid stenosis seen on Doppler. The patient is now transferred to the medical service for cardiac monitoring and full neurological evaluation.  Patient received 325 mg of aspirin and Eliquis is increased to 5 mg twice a day. Vasular surgical consultation requested.

## 2019-06-25 NOTE — CONSULT NOTE ADULT - SUBJECTIVE AND OBJECTIVE BOX
General Surgery Consult  Consulting surgical team: Vascular   Consulting attending: Jayna    CC: 89 yo F with a PMH of HTN, HLD, A-Fib (on Eliquis) and Polio (w/ residual LLE 1/5 strength) who is admitted for right femoral neck fracture after a slip and fall s/p operative repair. C/b new right facial droop and RUE weakness postoperatively. LKN  at bedtime. MRI showed acute infarcts in R MCA territory. Vascular duplex obtained which showed >70% stenosis in R ICA (per prelim read) for which vascular surgery is consulted.      PAST MEDICAL HISTORY:  Pleural effusion on left  Splenic abscess  Polio  High cholesterol  Hypertension      PAST SURGICAL HISTORY:  History of appendectomy      MEDICATIONS:  acetaminophen   Tablet .. 975 milliGRAM(s) Oral every 8 hours  amLODIPine   Tablet 5 milliGRAM(s) Oral daily  apixaban 5 milliGRAM(s) Oral two times a day  aspirin 325 milliGRAM(s) Oral daily  bisacodyl Suppository 10 milliGRAM(s) Rectal daily PRN  docusate sodium 100 milliGRAM(s) Oral three times a day  lidocaine   Patch 1 Patch Transdermal every 24 hours  losartan 100 milliGRAM(s) Oral daily  magnesium hydroxide Suspension 30 milliLiter(s) Oral daily PRN  mirtazapine 15 milliGRAM(s) Oral at bedtime PRN  ondansetron Injectable 4 milliGRAM(s) IV Push every 6 hours PRN  oxybutynin 5 milliGRAM(s) Oral daily  pantoprazole    Tablet 40 milliGRAM(s) Oral before breakfast  polyethylene glycol 3350 17 Gram(s) Oral daily  senna 2 Tablet(s) Oral at bedtime  simvastatin 20 milliGRAM(s) Oral at bedtime  traMADol 25 milliGRAM(s) Oral every 4 hours PRN  traMADol 50 milliGRAM(s) Oral every 4 hours PRN      ALLERGIES:  No Known Allergies      VITALS & I/Os:  Vital Signs Last 24 Hrs  T(C): 36.9 (2019 20:27), Max: 36.9 (2019 04:25)  T(F): 98.4 (2019 20:27), Max: 98.5 (2019 04:25)  HR: 78 (2019 20:27) (72 - 78)  BP: 128/60 (2019 20:27) (109/62 - 128/60)  BP(mean): --  RR: 18 (2019 20:27) (18 - 18)  SpO2: 95% (2019 20:27) (87% - 97%)    I&O's Summary    2019 07:01  -  2019 07:00  --------------------------------------------------------  IN: 490 mL / OUT: 1950 mL / NET: -1460 mL    2019 07:01  -  2019 23:08  --------------------------------------------------------  IN: 0 mL / OUT: 300 mL / NET: -300 mL        PHYSICAL EXAM:  General: No acute distress  Respiratory: Nonlabored  Cardiovascular: RRR  Abdominal: Soft, nondistended, nontender.  Extremities: Warm    LABS:                        8.7    7.52  )-----------( 161      ( 2019 10:04 )             28.5     06-25    141  |  106  |  28<H>  ----------------------------<  107<H>  4.4   |  23  |  1.34<H>    Ca    9.0      2019 07:01      Lactate:              Urinalysis Basic - ( 2019 00:30 )    Color: Light Yellow / Appearance: Clear / S.013 / pH: x  Gluc: x / Ketone: Negative  / Bili: Negative / Urobili: Negative   Blood: x / Protein: Trace / Nitrite: Negative   Leuk Esterase: Negative / RBC: 1 /hpf / WBC 1 /HPF   Sq Epi: x / Non Sq Epi: 1 /hpf / Bacteria: Negative        IMAGING: Pending

## 2019-06-25 NOTE — DISCHARGE NOTE NURSING/CASE MANAGEMENT/SOCIAL WORK - NSDCPEPTSTRK_GEN_ALL_CORE
Prescribed medications/Need for follow up after discharge/Risk factors for stroke/Stroke education booklet/Stroke warning signs and symptoms/Signs and symptoms of stroke/Call 911 for stroke/Stroke support groups for patients, families, and friends

## 2019-06-25 NOTE — PROGRESS NOTE ADULT - ASSESSMENT
The patient underwent a right hemiarthroplasty ORIF surgery on 6/22/2019. The patient is now postop, feeling well and complaining of moderate right hip incisional pain.  The patient was 24 hours postop, when she developed acute left hemiparesis, arm greater than leg. he extent of her stroke and etiology has yet to be determined.

## 2019-06-26 LAB
ANION GAP SERPL CALC-SCNC: 10 MMOL/L — SIGNIFICANT CHANGE UP (ref 5–17)
BUN SERPL-MCNC: 27 MG/DL — HIGH (ref 7–23)
CALCIUM SERPL-MCNC: 9.2 MG/DL — SIGNIFICANT CHANGE UP (ref 8.4–10.5)
CHLORIDE SERPL-SCNC: 106 MMOL/L — SIGNIFICANT CHANGE UP (ref 96–108)
CHOLEST SERPL-MCNC: 117 MG/DL — SIGNIFICANT CHANGE UP (ref 10–199)
CO2 SERPL-SCNC: 24 MMOL/L — SIGNIFICANT CHANGE UP (ref 22–31)
CREAT SERPL-MCNC: 1.31 MG/DL — HIGH (ref 0.5–1.3)
GLUCOSE SERPL-MCNC: 130 MG/DL — HIGH (ref 70–99)
HBA1C BLD-MCNC: 5.6 % — SIGNIFICANT CHANGE UP (ref 4–5.6)
HCT VFR BLD CALC: 31 % — LOW (ref 34.5–45)
HDLC SERPL-MCNC: 37 MG/DL — LOW
HGB BLD-MCNC: 9.6 G/DL — LOW (ref 11.5–15.5)
LIPID PNL WITH DIRECT LDL SERPL: 53 MG/DL — SIGNIFICANT CHANGE UP
MCHC RBC-ENTMCNC: 30 PG — SIGNIFICANT CHANGE UP (ref 27–34)
MCHC RBC-ENTMCNC: 31 GM/DL — LOW (ref 32–36)
MCV RBC AUTO: 96.9 FL — SIGNIFICANT CHANGE UP (ref 80–100)
PLATELET # BLD AUTO: 198 K/UL — SIGNIFICANT CHANGE UP (ref 150–400)
POTASSIUM SERPL-MCNC: 4.6 MMOL/L — SIGNIFICANT CHANGE UP (ref 3.5–5.3)
POTASSIUM SERPL-SCNC: 4.6 MMOL/L — SIGNIFICANT CHANGE UP (ref 3.5–5.3)
RBC # BLD: 3.2 M/UL — LOW (ref 3.8–5.2)
RBC # FLD: 12.9 % — SIGNIFICANT CHANGE UP (ref 10.3–14.5)
SODIUM SERPL-SCNC: 140 MMOL/L — SIGNIFICANT CHANGE UP (ref 135–145)
TOTAL CHOLESTEROL/HDL RATIO MEASUREMENT: 3.2 RATIO — LOW (ref 3.3–7.1)
TRIGL SERPL-MCNC: 137 MG/DL — SIGNIFICANT CHANGE UP (ref 10–149)
WBC # BLD: 8.31 K/UL — SIGNIFICANT CHANGE UP (ref 3.8–10.5)
WBC # FLD AUTO: 8.31 K/UL — SIGNIFICANT CHANGE UP (ref 3.8–10.5)

## 2019-06-26 PROCEDURE — 70549 MR ANGIOGRAPH NECK W/O&W/DYE: CPT | Mod: 26

## 2019-06-26 PROCEDURE — 70544 MR ANGIOGRAPHY HEAD W/O DYE: CPT | Mod: 26

## 2019-06-26 RX ADMIN — Medication 325 MILLIGRAM(S): at 13:50

## 2019-06-26 RX ADMIN — LIDOCAINE 1 PATCH: 4 CREAM TOPICAL at 00:37

## 2019-06-26 RX ADMIN — Medication 975 MILLIGRAM(S): at 23:02

## 2019-06-26 RX ADMIN — LOSARTAN POTASSIUM 100 MILLIGRAM(S): 100 TABLET, FILM COATED ORAL at 05:14

## 2019-06-26 RX ADMIN — Medication 975 MILLIGRAM(S): at 05:36

## 2019-06-26 RX ADMIN — AMLODIPINE BESYLATE 5 MILLIGRAM(S): 2.5 TABLET ORAL at 05:14

## 2019-06-26 RX ADMIN — APIXABAN 5 MILLIGRAM(S): 2.5 TABLET, FILM COATED ORAL at 05:14

## 2019-06-26 RX ADMIN — Medication 975 MILLIGRAM(S): at 21:24

## 2019-06-26 RX ADMIN — LIDOCAINE 1 PATCH: 4 CREAM TOPICAL at 13:51

## 2019-06-26 RX ADMIN — Medication 975 MILLIGRAM(S): at 13:51

## 2019-06-26 RX ADMIN — LIDOCAINE 1 PATCH: 4 CREAM TOPICAL at 19:01

## 2019-06-26 RX ADMIN — Medication 5 MILLIGRAM(S): at 13:51

## 2019-06-26 RX ADMIN — SIMVASTATIN 20 MILLIGRAM(S): 20 TABLET, FILM COATED ORAL at 21:23

## 2019-06-26 RX ADMIN — APIXABAN 5 MILLIGRAM(S): 2.5 TABLET, FILM COATED ORAL at 18:10

## 2019-06-26 RX ADMIN — PANTOPRAZOLE SODIUM 40 MILLIGRAM(S): 20 TABLET, DELAYED RELEASE ORAL at 05:55

## 2019-06-26 RX ADMIN — MIRTAZAPINE 15 MILLIGRAM(S): 45 TABLET, ORALLY DISINTEGRATING ORAL at 02:14

## 2019-06-26 RX ADMIN — Medication 100 MILLIGRAM(S): at 13:51

## 2019-06-26 RX ADMIN — Medication 975 MILLIGRAM(S): at 05:14

## 2019-06-26 NOTE — OCCUPATIONAL THERAPY INITIAL EVALUATION ADULT - BALANCE TRAINING, PT EVAL
Pt will improve dynamic standing balance by 1/2 grade to improve functional performance with ADLs within 4 weeks.

## 2019-06-26 NOTE — PROGRESS NOTE ADULT - ASSESSMENT
A/P 89y year old female s/p Hemiarthroplasty, now with acute ischemic stroke presenting with L side weakness, numbness appears to be resolving.     Pain Control  DVT PPX - ASA BID  Afib -cont Eliquis  PT/OOB  WBAT   FU carotid dopplers, appreciate vascular recs  Appreciate care per primary team

## 2019-06-26 NOTE — PROGRESS NOTE ADULT - ASSESSMENT
The patient underwent a right hemiarthroplasty ORIF surgery on 6/22/2019. The patient is now postop, feeling well and complaining of moderate right hip incisional pain.  The patient was 24 hours postop, when she developed acute left hemiparesis, arm greater than leg. he extent of her stroke and etiology has yet to be determined.    (+) right carotid stenosis > 70% Fr MRA of the carotids and brain.  Vascular surgery to evaluate for treatment options

## 2019-06-26 NOTE — OCCUPATIONAL THERAPY INITIAL EVALUATION ADULT - DIAGNOSIS, OT EVAL
impaired balance, decreased strength, decreased AROM, decreased endurance,  cognitive impairments, impacting independence with ADLs and ability to perform functional mobility.

## 2019-06-26 NOTE — PROGRESS NOTE ADULT - SUBJECTIVE AND OBJECTIVE BOX
Patient is a 89y old  Female who presents with a chief complaint of hip fracture.  s/p mech fall in the kitchen onto R hip when right leg gave way, now c/o severe right hip pain and inability to ambulate.  Patient denies headstrike or LOC. Localizes pain to right hip/femur/knee. Patient denies radiation of pain. Patient denies numbness/tingling/burning in the RLE. No other bone/joint complaints. Patient is a community ambulator at baseline with a rolling walker. Patient has no issues w/ ADLs/IADLs. The patient underwent a right hemiarthroplasty ORIF surgery on 2019. The patient is now postop, feeling well and complaining of moderate right hip incisional pain. When last seen on the morning of 19, she is wanted to be out of bed to chair and begin ambulation. She had an uneventful day until 6:30 PM when she noted weakness of her left hand and altered ability to move her fingers.  Her daughter noticed facial weakness and slurring of words.  No staff was notified and the patient remained this way until next examined by physical therapy on the morning of 19.  She was found to have  significant left hemiparesis of the left arm greater than left leg, with facial droop and difficulty articulating.  The patient was alert and oriented x3 and she answered questions appropriately.  She had clear weakness in the left arm and left leg as compared to the day before.  EKG was obtained and she was in normal sinus rhythm,  with no ST-T wave changes. Blood pressure was low between 100- 120 systolic.  Blood pressure medications were held, unless blood pressure was greater than 130 systolic.  The patient had a neurological consultation and  right brain CVA was suspected.  The patient underwent CT of the brain at this time showed no hemorrhages or masses.  There was no hydrocephalus.  Possibility of infarction was present and an MRI was advised.  After the CT scan of the brain the patient received 325 mg of aspirin.  It was now approximately 16 hours after the onset of left hemiparesis.  The patient was transferred to a monitored bed to rule out  episodic paroxysmal atrial fibrillation that could have caused her  acute CVA.  The patient had previously been on 2.5 mg Eliquis twice a day and dosage wasnow increased to 5 mg twice a day for therapeutic anticoagulation.  Her rhythm has remained  NSR with a pulse of 64.  The patient has no pain and with the exception of left-sided weakness, has no changes in her condition.  She complains bitterly of right leg postoperative pain  The patient has no new medical complaints at this time. 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  The patient and daughter were fully  informed of right carotid critical stenosis. Telemetry continues with NSR  and no arrhythmias    Spoke with Dr Acosta Vascular  >70% stenosis to have MRA of brain and carotid and Dr Carnes vasular surgery to see patient  for possible surgical intervention.        MEDICATIONS  (STANDING):  acetaminophen   Tablet .. 975 milliGRAM(s) Oral every 8 hours  amLODIPine   Tablet 5 milliGRAM(s) Oral daily  apixaban 5 milliGRAM(s) Oral two times a day  aspirin 325 milliGRAM(s) Oral daily  docusate sodium 100 milliGRAM(s) Oral three times a day  lidocaine   Patch 1 Patch Transdermal every 24 hours  losartan 100 milliGRAM(s) Oral daily  oxybutynin 5 milliGRAM(s) Oral daily  pantoprazole    Tablet 40 milliGRAM(s) Oral before breakfast  polyethylene glycol 3350 17 Gram(s) Oral daily  senna 2 Tablet(s) Oral at bedtime  simvastatin 20 milliGRAM(s) Oral at bedtime    MEDICATIONS  (PRN):  bisacodyl Suppository 10 milliGRAM(s) Rectal daily PRN If no bowel movement  magnesium hydroxide Suspension 30 milliLiter(s) Oral daily PRN Constipation  mirtazapine 15 milliGRAM(s) Oral at bedtime PRN Insomnia  ondansetron Injectable 4 milliGRAM(s) IV Push every 6 hours PRN Nausea and/or Vomiting  traMADol 25 milliGRAM(s) Oral every 4 hours PRN Moderate Pain (4 - 6)  traMADol 50 milliGRAM(s) Oral every 4 hours PRN Severe Pain (7 - 10)        Allergies    No Known Allergies    Intolerances      Vital Signs Last 24 Hrs  T(C): 36.9 (2019 11:24), Max: 37.4 (2019 04:23)  T(F): 98.5 (2019 11:24), Max: 99.3 (2019 04:23)  HR: 73 (2019 11:24) (73 - 82)  BP: 117/70 (2019 11:24) (117/70 - 153/55)  BP(mean): --  RR: 17 (2019 11:24) (17 - 18)  SpO2: 96% (2019 11:24) (87% - 98%)    PHYSICAL EXAM:  GENERAL: NAD, well nourished and conversant  HEAD:  Atraumatic  (+)  > 70% stenosis Right ICA  EYES: EOM, PERRLA, conjunctiva pink and sclera white  ENT: No tonsillar erythema, exudates, or enlargement, moist mucous membranes, good dentition, no lesions  NECK: Supple, No JVD, normal thyroid, carotids with normal upstrokes and no bruits  CHEST/LUNG: Clear to auscultation bilaterally, No rales, rhonchi, wheezing, or rubs  HEART: Regular rate and rhythm, No murmurs, rubs, or gallops  ABDOMEN: Soft, nondistended, no masses, guarding, tenderness or rebound, bowel sounds present  EXTREMITIES:  2+ Peripheral Pulses, No clubbing, cyanosis, or edema. No arthritis of shoulders, elbows, hands, hips, knees, ankles, or feet. No DJD C spine, T spine, or L/S spine  LYMPH: No lymphadenopathy noted  SKIN: No rashes or lesions  NERVOUS SYSTEM:  Alert & Oriented X3, normal cognitive function. Motor Strength 5/5 right upper and right lower.  4/5 left upper and left lower extremities, DTRs 2+ intact and symmetric            CBC Full  -  ( 2019 08:17 )  WBC Count : 8.31 K/uL  RBC Count : 3.20 M/uL  Hemoglobin : 9.6 g/dL  Hematocrit : 31.0 %  Platelet Count - Automated : 198 K/uL  Mean Cell Volume : 96.9 fl  Mean Cell Hemoglobin : 30.0 pg  Mean Cell Hemoglobin Concentration : 31.0 gm/dL  Auto Neutrophil # : x  Auto Lymphocyte # : x  Auto Monocyte # : x  Auto Eosinophil # : x  Auto Basophil # : x  Auto Neutrophil % : x  Auto Lymphocyte % : x  Auto Monocyte % : x  Auto Eosinophil % : x  Auto Basophil % : x    06-    140  |  106  |  27<H>  ----------------------------<  130<H>  4.6   |  24  |  1.31<H>    Ca    9.2      2019 06:15          Urinalysis Basic - ( 2019 00:30 )    Color: Light Yellow / Appearance: Clear / S.013 / pH: x  Gluc: x / Ketone: Negative  / Bili: Negative / Urobili: Negative   Blood: x / Protein: Trace / Nitrite: Negative   Leuk Esterase: Negative / RBC: 1 /hpf / WBC 1 /HPF   Sq Epi: x / Non Sq Epi: 1 /hpf / Bacteria: Negative        CBC Full  -  ( 2019 10:04 )  WBC Count : 7.52 K/uL  RBC Count : 2.87 M/uL  Hemoglobin : 8.7 g/dL  Hematocrit : 28.5 %  Platelet Count - Automated : 161 K/uL  Mean Cell Volume : 99.3 fl  Mean Cell Hemoglobin : 30.3 pg  Mean Cell Hemoglobin Concentration : 30.5 gm/dL  Auto Neutrophil # : x  Auto Lymphocyte # : x  Auto Monocyte # : x  Auto Eosinophil # : x  Auto Basophil # : x  Auto Neutrophil % : x  Auto Lymphocyte % : x  Auto Monocyte % : x  Auto Eosinophil % : x  Auto Basophil % : x        141  |  106  |  28<H>  ----------------------------<  107<H>  4.4   |  23  |  1.34<H>    Ca    9.0      2019 07:01          Urinalysis Basic - ( 2019 00:30 )    Color: Light Yellow / Appearance: Clear / S.013 / pH: x  Gluc: x / Ketone: Negative  / Bili: Negative / Urobili: Negative   Blood: x / Protein: Trace / Nitrite: Negative   Leuk Esterase: Negative / RBC: 1 /hpf / WBC 1 /HPF   Sq Epi: x / Non Sq Epi: 1 /hpf / Bacteria: Negative                  RADIOLOGY & ADDITIONAL TESTS:      Consultant(s):    Care Discussed with Consultants/Other Providers [ ] YES  [ ] NO

## 2019-06-26 NOTE — OCCUPATIONAL THERAPY INITIAL EVALUATION ADULT - RANGE OF MOTION EXAMINATION, LOWER EXTREMITY
Decreased LLE AROM. Decreased distal RLE AROM/Left LE Passive ROM was WNL (within normal limits)/Right LE Passive ROM was WNL (within normal limits)

## 2019-06-26 NOTE — PHYSICAL THERAPY INITIAL EVALUATION ADULT - FOLLOWS COMMANDS/ANSWERS QUESTIONS, REHAB EVAL
100% of the time/able to follow single-step instructions
able to follow single-step instructions/100% of the time

## 2019-06-26 NOTE — PHYSICAL THERAPY INITIAL EVALUATION ADULT - BED MOBILITY TRAINING, PT EVAL
GOAL: patient will be able to perform bed mob (I) in 4 weeks
GOAL: patient will be able to performed bed mob (I) in 2 weeks

## 2019-06-26 NOTE — PHYSICAL THERAPY INITIAL EVALUATION ADULT - PASSIVE RANGE OF MOTION EXAMINATION, REHAB EVAL
Right LE Passive ROM was WFL (within functional limits)
Right LE Passive ROM was WFL (within functional limits)

## 2019-06-26 NOTE — PHYSICAL THERAPY INITIAL EVALUATION ADULT - ADDITIONAL COMMENTS
as per pt and daughter at b/s, pt resides in a PH alone, 4 stairs to enter with railings, one floor set up, PTA, pt amb (I) with rollator, distance limited to 100ft x2 d/t decr endurance, (I) with ADls.
as per pt and daughter at b/s, pt resides in a PH alone, 4 stairs to enter with railings, one floor set up, PTA, pt amb (I) with rollator, distance limited to 100ft x2 d/t decr endurance, (I) with ADls.

## 2019-06-26 NOTE — PHYSICAL THERAPY INITIAL EVALUATION ADULT - BALANCE DISTURBANCE, IDENTIFIED IMPAIRMENT CONTRIBUTE, REHAB EVAL
pain/decreased strength
impaired motor control/impaired postural control/decreased strength/pain/decreased ROM

## 2019-06-26 NOTE — OCCUPATIONAL THERAPY INITIAL EVALUATION ADULT - VISUAL ASSESSMENT: EYE MUSCLE BALANCE
normal Non-Graft Cartilage Fenestration Text: The cartilage was fenestrated with a 2mm punch biopsy to help facilitate healing.

## 2019-06-26 NOTE — PROGRESS NOTE ADULT - SUBJECTIVE AND OBJECTIVE BOX
I was requested by Gunner brooks and  Madi  to see this  patient  According  to the  family  patient  is  seeing  a Vascular Surgeon  for the  first time  today

## 2019-06-26 NOTE — OCCUPATIONAL THERAPY INITIAL EVALUATION ADULT - PLANNED THERAPY INTERVENTIONS, OT EVAL
bed mobility training/strengthening/transfer training/cognitive, visual perceptual/ADL retraining/balance training/fine motor coordination training/motor coordination training/ROM

## 2019-06-26 NOTE — PHYSICAL THERAPY INITIAL EVALUATION ADULT - BED MOBILITY LIMITATIONS, REHAB EVAL
impaired ability to control trunk for mobility/decreased ability to use legs for bridging/pushing
decreased ability to use legs for bridging/pushing/decreased ability to use arms for pushing/pulling

## 2019-06-26 NOTE — OCCUPATIONAL THERAPY INITIAL EVALUATION ADULT - PRECAUTIONS/LIMITATIONS, REHAB EVAL
fall precautions/right hip precautions/(posterior hip precautions). Patient has no issues w/ ADLs/IADLs. MRI Head (6/24): Several small acute right MCA territory infarcts, the largest of which is in the right centrum semiovale approximating the perirolandic cortex. There is no hemorrhagic transformation.  CT Head (6/24): An age-indeterminate lacunar infarct is present in the right posterior frontal-parietal centrum semiovale.  CT pelvic 6/22, Acute R subcapital femoral neck fx, small R knee joint effusion, CXR (-), XR RLE 6/22, acute R femoral neck fx, ECG 6/21, L axis deviation.

## 2019-06-26 NOTE — PROGRESS NOTE ADULT - ATTENDING COMMENTS
The patient suffered an acute right CVA 24, hours postop. Severe right carotid stenosis seen on Doppler. The patient is now transferred to the medical service for cardiac monitoring and full neurological evaluation.  Patient received 325 mg of aspirin and Eliquis is increased to 5 mg twice a day. Vascular surgical consultation requested.  Carotid Right side 70%  stenosis  - to have MRA of carotids and brain

## 2019-06-26 NOTE — PROGRESS NOTE ADULT - SUBJECTIVE AND OBJECTIVE BOX
Orthopedic Surgery Progress Note    S: Patient suffered likely ischemic stroke 2 days ago s/p R hip hemiarthroplasty, reporting LUE weakness/numbness.  She now denies any numbness/tingling, still complaining of weakness most prominent in the L hand.  Pain is well controlled, no other complaints.        O:  Vital Signs Last 24 Hrs  T(C): 37.4 (26 Jun 2019 04:23), Max: 37.4 (26 Jun 2019 04:23)  T(F): 99.3 (26 Jun 2019 04:23), Max: 99.3 (26 Jun 2019 04:23)  HR: 82 (26 Jun 2019 04:23) (72 - 82)  BP: 153/55 (26 Jun 2019 04:23) (124/67 - 153/55)  BP(mean): --  RR: 18 (26 Jun 2019 04:23) (18 - 18)  SpO2: 98% (26 Jun 2019 04:23) (87% - 98%)    Gen: Alert and oriented x3, NAD, patient resting in bed  Facial droop and mild dysarthria present  RUE: 5/5, SILT  WWP distally  LUE: Intrinsics 2/5, otherwise 4/5, SILT  WWP distally  LUE: 5/5, SILT  WWP distally  RLE:  Dressing C/D/I  EHL/FHL 5/5, TA/GS 2/5  SILT DP/SP/Serrano/Sa  WWP distally    Labs:                        8.7    7.52  )-----------( 161      ( 25 Jun 2019 10:04 )             28.5       06-25    141  |  106  |  28<H>  ----------------------------<  107<H>  4.4   |  23  |  1.34<H>      TTE: no PFO, normal LV systolic function    < from: [facility] Duplex Lower Ext Vein Scan, Bilat (06.25.19 @ 10:35) >  IMPRESSION:     No evidence of deep venous thrombosis in either lower extremity.      < end of copied text >      < from: MR Head w/wo IV Cont (06.24.19 @ 23:53) >  IMPRESSION:    Several small acute right MCA territory infarcts, the largest of which is   in the right centrum semiovale approximating the perirolandic cortex.   There is no hemorrhagic transformation.    No abnormal parenchymal or leptomeningeal enhancement.    < end of copied text >

## 2019-06-26 NOTE — PHYSICAL THERAPY INITIAL EVALUATION ADULT - ACTIVE RANGE OF MOTION EXAMINATION, REHAB EVAL
Left LE Active ROM was WFL (within functional limits)/Right LE Active ROM was WFL (within functional limits)/RLE AROM limited 2/2 pain, LUE leeann flexion limited to <90 degrees
Left LE Active ROM was WFL (within functional limits)/bilateral upper extremity Active ROM was WFL (within functional limits)

## 2019-06-26 NOTE — PHYSICAL THERAPY INITIAL EVALUATION ADULT - GAIT TRAINING, PT EVAL
GOAL: patient will be able to amb (I) with RW for 50 ft in 4 weeks
GOAL: patient will be able to amb (I) with RW for >100 ft in 2 weeks

## 2019-06-26 NOTE — PHYSICAL THERAPY INITIAL EVALUATION ADULT - PLANNED THERAPY INTERVENTIONS, PT EVAL
bed mobility training/transfer training/stairs: GOAL: patient will be able to negotiated 4 stairs (I) with U HR in 4 weeks/strengthening/gait training
transfer training/gait training/stairs: GOAL: patient will be able to negotiated 4 stairs (I) with one HR in 2 weeks/bed mobility training

## 2019-06-26 NOTE — PROGRESS NOTE ADULT - ATTENDING COMMENTS
Eyeglasses VASCULAR NEUROLOGY ATTENDING  The patient is seen and examined the history and imaging are reviewed. I agree with the resident note unless otherwise noted. Patient with R ICA presumed symptomatic stenosis. Competing mechanism would be PAF having been off AC for hip repair. Spoke with family and patient at length regarding risks and benefits of surgical intervention. Discussed stenting vs. CEA. Family awaiting vascular surgery to discuss options. All questions answered to the best of my abilities. Case discussed with Dr. Al and Dr. Conte.

## 2019-06-26 NOTE — PHYSICAL THERAPY INITIAL EVALUATION ADULT - TRANSFER TRAINING, PT EVAL
GOAL: patient will be able to perform sit<>stand transfers with CG Ax1 and RW in 4 weeks
GOAL: patient will be able to performed sit<>stand transfer (I) with RW in 2 weeks

## 2019-06-26 NOTE — PHYSICAL THERAPY INITIAL EVALUATION ADULT - IMPAIRMENTS CONTRIBUTING IMPAIRED BED MOBILITY, REHAB EVAL
pain/decreased ROM/decreased strength
decreased strength/decreased ROM/impaired balance/pain/impaired postural control/impaired motor control

## 2019-06-26 NOTE — OCCUPATIONAL THERAPY INITIAL EVALUATION ADULT - LIVES WITH, PROFILE
per pt and daughter at b/s, pt resides in a PH alone, 4 stairs to enter with railings, one floor set up, PTA, pt amb (I) with rollator, distance limited to 100ft x2 d/t decr endurance, (I) with ADls./alone

## 2019-06-26 NOTE — OCCUPATIONAL THERAPY INITIAL EVALUATION ADULT - RANGE OF MOTION EXAMINATION, UPPER EXTREMITY
Left UE Passive ROM was WNL (within normal limits)/Right UE Active ROM was WNL (within normal limits)/LUE decreased AROM/Right UE Passive ROM was WNL (within normal limits)

## 2019-06-26 NOTE — PHYSICAL THERAPY INITIAL EVALUATION ADULT - DISCHARGE DISPOSITION, PT EVAL
subacute rehab for strengthening, bed mob, transfer, gait and balance, endurance training/rehabilitation facility rehabilitation facility/subacute rehab

## 2019-06-26 NOTE — PROGRESS NOTE ADULT - ASSESSMENT
Pt is a 89 yo F with a PMH of HTN, HLD, A-Fib (on Eliquis) and Polio (w/ residual LLE 1/5 strength) who is admitted for right femoral neck fracture after a slip and fall. Neurology consulted for new onset left facial droop, RUE weakness w/ decreased sensation. Exam initially showed Left nasolabial flattening with JACKIE 4/5 and decreased sensation on the left with upgoing left toe. NIHSS: 6. MRS: 4. MRI brain - small acute right MCA territory infarcts. Carotid doppler showing right ICA stenosis, official read pending. TTE unremarkable.    Impression - Small Right MCA territory infarcts likely artery to artery embolism in the setting of right ICA stenosis    Recommendations:  Lipid panel and Hba1c  Follow up official carotid doppler read  Vascular surgery consulted  c/w Eliquis  c/w Simvastatin 20mg PO HS  PT/OT  Case to be discussed with attending

## 2019-06-26 NOTE — PROGRESS NOTE ADULT - SUBJECTIVE AND OBJECTIVE BOX
Neurology Progress Note    Interval history -     MEDICATIONS  (STANDING):  acetaminophen   Tablet .. 975 milliGRAM(s) Oral every 8 hours  amLODIPine   Tablet 5 milliGRAM(s) Oral daily  apixaban 5 milliGRAM(s) Oral two times a day  aspirin 325 milliGRAM(s) Oral daily  docusate sodium 100 milliGRAM(s) Oral three times a day  lidocaine   Patch 1 Patch Transdermal every 24 hours  losartan 100 milliGRAM(s) Oral daily  oxybutynin 5 milliGRAM(s) Oral daily  pantoprazole    Tablet 40 milliGRAM(s) Oral before breakfast  polyethylene glycol 3350 17 Gram(s) Oral daily  senna 2 Tablet(s) Oral at bedtime  simvastatin 20 milliGRAM(s) Oral at bedtime    MEDICATIONS  (PRN):  bisacodyl Suppository 10 milliGRAM(s) Rectal daily PRN If no bowel movement  magnesium hydroxide Suspension 30 milliLiter(s) Oral daily PRN Constipation  mirtazapine 15 milliGRAM(s) Oral at bedtime PRN Insomnia  ondansetron Injectable 4 milliGRAM(s) IV Push every 6 hours PRN Nausea and/or Vomiting  traMADol 25 milliGRAM(s) Oral every 4 hours PRN Moderate Pain (4 - 6)  traMADol 50 milliGRAM(s) Oral every 4 hours PRN Severe Pain (7 - 10)    Vital Signs Last 24 Hrs  T(C): 37.4 (26 Jun 2019 04:23), Max: 37.4 (26 Jun 2019 04:23)  T(F): 99.3 (26 Jun 2019 04:23), Max: 99.3 (26 Jun 2019 04:23)  HR: 82 (26 Jun 2019 04:23) (72 - 82)  BP: 153/55 (26 Jun 2019 04:23) (124/67 - 153/55)  BP(mean): --  RR: 18 (26 Jun 2019 04:23) (18 - 18)  SpO2: 98% (26 Jun 2019 04:23) (87% - 98%)    General Exam: (From previous, not updated)  General appearance: No acute distress    Neurological Exam:  Mental Status: Orientated to self, date and place.  Attention intact.  No dysarthria. Speech fluent.  Cranial Nerves: PERRL, EOMI, VFF, no nystagmus. Decreased sensation to LT in the left CN V1-3. Nasolabial flattening on the left. Hearing intact to finger rub bilaterally.  Tongue, uvula and palate midline.  Sternocleidomastoid and Trapezius intact bilaterally.    Motor:   Tone: normal.                  Strength:     [] Upper extremity                      Delt       Bicep    Tricep                                                  R         5/5 5/5 5/5 5/5                                               L          4/5 4/5 4/5 4/5  [] Lower extremity                       HF          KE          KF        DF         PF                                               R        1/5 1/5 1/5 1/5 1/5                                               L         5/5 5/5 5/5 5/5 5/5  Dysmetria: None to finger-nose-finger b/l (but weakness limited in the LUE)  No truncal ataxia.    Tremor: No resting, postural or action tremor.  No myoclonus.    Sensation: decreased to light touch in the LUE, no extinction    Deep Tendon Reflexes:              Biceps          BR        Patellar        Ankle       Babinski                                         R       2+               2+                                         upgoing                                         L        2+               2+        2+               0             upgoing                          8.7    7.52  )-----------( 161      ( 25 Jun 2019 10:04 )             28.5   06-26    140  |  106  |  27<H>  ----------------------------<  130<H>  4.6   |  24  |  1.31<H>    Ca    9.2      26 Jun 2019 06:15    Images  < from: MR Head w/wo IV Cont (06.24.19 @ 23:53) >  There are several small acute right MCA territory infarcts, the largest   of which is in the right centrum semiovale approximating the perirolandic   cortex. There is no hemorrhagic transformation.    No hydrocephalus, midline shift, large mass effect, or acute intracranial   hemorrhage. Extensive white matter microvascular ischemic disease. Signal   voids are seen within the major intracranial vessels consistent with   their patency.    No abnormal parenchymal or leptomeningeal enhancement.    The visualized paranasal sinuses and mastoid air cells are clear. The   orbits, sellar and suprasellar structures, and craniocervical junction   are unremarkable.    IMPRESSION:    Several small acute right MCA territory infarcts, the largest of which is   in the right centrum semiovale approximating the perirolandic cortex.   There is no hemorrhagic transformation.    No abnormal parenchymal or leptomeningeal enhancement.    < end of copied text > Neurology Progress Note    Interval history - no overnight events     MEDICATIONS  (STANDING):  acetaminophen   Tablet .. 975 milliGRAM(s) Oral every 8 hours  amLODIPine   Tablet 5 milliGRAM(s) Oral daily  apixaban 5 milliGRAM(s) Oral two times a day  aspirin 325 milliGRAM(s) Oral daily  docusate sodium 100 milliGRAM(s) Oral three times a day  lidocaine   Patch 1 Patch Transdermal every 24 hours  losartan 100 milliGRAM(s) Oral daily  oxybutynin 5 milliGRAM(s) Oral daily  pantoprazole    Tablet 40 milliGRAM(s) Oral before breakfast  polyethylene glycol 3350 17 Gram(s) Oral daily  senna 2 Tablet(s) Oral at bedtime  simvastatin 20 milliGRAM(s) Oral at bedtime    MEDICATIONS  (PRN):  bisacodyl Suppository 10 milliGRAM(s) Rectal daily PRN If no bowel movement  magnesium hydroxide Suspension 30 milliLiter(s) Oral daily PRN Constipation  mirtazapine 15 milliGRAM(s) Oral at bedtime PRN Insomnia  ondansetron Injectable 4 milliGRAM(s) IV Push every 6 hours PRN Nausea and/or Vomiting  traMADol 25 milliGRAM(s) Oral every 4 hours PRN Moderate Pain (4 - 6)  traMADol 50 milliGRAM(s) Oral every 4 hours PRN Severe Pain (7 - 10)    Vital Signs Last 24 Hrs  T(C): 37.4 (26 Jun 2019 04:23), Max: 37.4 (26 Jun 2019 04:23)  T(F): 99.3 (26 Jun 2019 04:23), Max: 99.3 (26 Jun 2019 04:23)  HR: 82 (26 Jun 2019 04:23) (72 - 82)  BP: 153/55 (26 Jun 2019 04:23) (124/67 - 153/55)  BP(mean): --  RR: 18 (26 Jun 2019 04:23) (18 - 18)  SpO2: 98% (26 Jun 2019 04:23) (87% - 98%)    General Exam: (From previous, not updated)  General appearance: No acute distress    Neurological Exam:  Mental Status: Orientated to self, date and place.  Attention intact.  No dysarthria. Speech fluent.  Cranial Nerves: PERRL, EOMI, VFF, no nystagmus. Decreased sensation to LT in the left CN V1-3. Nasolabial flattening on the left. Hearing intact to finger rub bilaterally.  Tongue, uvula and palate midline.  Sternocleidomastoid and Trapezius intact bilaterally.    Motor:   Tone: normal.                  Strength:     [] Upper extremity                      Delt       Bicep    Tricep                                                  R         5/5 5/5 5/5 5/5                                               L          4/5 4/5 4/5 4/5  [] Lower extremity                       HF          KE          KF        DF         PF                                               R        1/5 1/5 1/5 1/5 1/5                                               L         5/5 5/5 5/5 5/5 5/5  Dysmetria: None to finger-nose-finger b/l (but weakness limited in the LUE)  No truncal ataxia.    Tremor: No resting, postural or action tremor.  No myoclonus.    Sensation: decreased to light touch in the LUE, no extinction    Deep Tendon Reflexes:              Biceps          BR        Patellar        Ankle       Babinski                                         R       2+               2+                                         upgoing                                         L        2+               2+        2+               0             upgoing                          8.7    7.52  )-----------( 161      ( 25 Jun 2019 10:04 )             28.5   06-26    140  |  106  |  27<H>  ----------------------------<  130<H>  4.6   |  24  |  1.31<H>    Ca    9.2      26 Jun 2019 06:15    Images  < from: MR Head w/wo IV Cont (06.24.19 @ 23:53) >  There are several small acute right MCA territory infarcts, the largest   of which is in the right centrum semiovale approximating the perirolandic   cortex. There is no hemorrhagic transformation.    No hydrocephalus, midline shift, large mass effect, or acute intracranial   hemorrhage. Extensive white matter microvascular ischemic disease. Signal   voids are seen within the major intracranial vessels consistent with   their patency.    No abnormal parenchymal or leptomeningeal enhancement.    The visualized paranasal sinuses and mastoid air cells are clear. The   orbits, sellar and suprasellar structures, and craniocervical junction   are unremarkable.    IMPRESSION:    Several small acute right MCA territory infarcts, the largest of which is   in the right centrum semiovale approximating the perirolandic cortex.   There is no hemorrhagic transformation.    No abnormal parenchymal or leptomeningeal enhancement.    < end of copied text >

## 2019-06-26 NOTE — PROGRESS NOTE ADULT - SUBJECTIVE AND OBJECTIVE BOX
Patient is  seen  She  had  hemiarthroplasty right  hip  and  suffered  a  right  MCA  distribution  stroke  still has  residual  LUE weakness   speech is  normal  No facial droop US shows  severe  right  ICA stenosis  PsV  over  600   I discussed  the  risks  of  this  condition  future  TIA or CVA   right  CEA vs  Right  BHUMI  discussed  Right  CEA  carries  a lower  risk  compared to  BHUMI she is  also on Eliquis  and  received   325 mg  aspirin   Her  age  and comorbid  conditions  will put  her  at a high risk  for CEA  even though  medically speaking  this  is indicated  Cardiac eval  will be  helpful as well I discussed at length  with her  and her  children  Patient  is  currently  does not  want  any  surgery  but  wants  to wait  till MRA is  done  I will speak to them  tomorrow

## 2019-06-26 NOTE — PHYSICAL THERAPY INITIAL EVALUATION ADULT - PRECAUTIONS/LIMITATIONS, REHAB EVAL
fall precautions/right hip precautions/posterior hip precautions fall precautions/posterior hip precautions;   MRA Head 6/24: Several small acute right MCA territory infarcts, the largest of which is in the right centrum semiovale approximating the perirolandic cortex. There is no hemorrhagic transformation. No abnormal parenchymal or leptomeningeal enhancement. CTH 6/24: An age-indeterminate lacunar infarct is present in the right posterior frontal-parietal centrum semiovale. VA Duplex RLE (-) VA Duplex B/L Carotid: There is a severe, greater than 70% stenosis of the right internal carotid artery./right hip precautions

## 2019-06-26 NOTE — PHYSICAL THERAPY INITIAL EVALUATION ADULT - GENERAL OBSERVATIONS, REHAB EVAL
semi-supine in bed with NAD, +IV
Pt rec'd supine in bed in NAD, VSS, +O2 via NC, +female ex cath, +venodynes, +dressing to R hip C/D/I family at b/s, agreeable to PT session

## 2019-06-26 NOTE — PHYSICAL THERAPY INITIAL EVALUATION ADULT - IMPAIRMENTS FOUND, PT EVAL
gait, locomotion, and balance/muscle strength/aerobic capacity/endurance
gait, locomotion, and balance/aerobic capacity/endurance/muscle strength

## 2019-06-26 NOTE — PHYSICAL THERAPY INITIAL EVALUATION ADULT - MANUAL MUSCLE TESTING RESULTS, REHAB EVAL
grossly assessed due to/RUE and LLE grossly at least 3/5, RLE not tested 2/2 pain, LUE ~3-/5
UEs: 4/5, LLE: 4-/5, RLE: n/t

## 2019-06-26 NOTE — OCCUPATIONAL THERAPY INITIAL EVALUATION ADULT - PERTINENT HX OF CURRENT PROBLEM, REHAB EVAL
89y Female presents to [facility] ED s/p mech fall in the kitchen onto R hip when right leg gave way, now c/o severe right hip pain and inability to ambulate.  Patient denies headstrike or LOC. Localizes pain to right hip/femur/knee. Patient denies radiation of pain. Patient denies numbness/tingling/burning in the RLE. No other bone/joint complaints. Patient is a community ambulator at baseline with a rolling walker.

## 2019-06-26 NOTE — PHYSICAL THERAPY INITIAL EVALUATION ADULT - PERTINENT HX OF CURRENT PROBLEM, REHAB EVAL
89yoF s/p fall with R femoral neck fx, now s/p above procedure, CT pelvic 6/22, Acute R subcapital femoral neck fx, small R knee joint effusion, CXR (-), XR RLE 6/22, acute R femoral neck fx, ECG 6/21, L axis deviation
89yoF s/p fall with R femoral neck fx, now s/p above procedure, CT pelvic 6/22, Acute R subcapital femoral neck fx, small R knee joint effusion, CXR (-), XR RLE 6/22, acute R femoral neck fx, ECG 6/21, L axis deviation. Post op course c/b stroke code called 6/24 for new onset LUE weakness and facial droop see below..

## 2019-06-27 LAB
ANION GAP SERPL CALC-SCNC: 13 MMOL/L — SIGNIFICANT CHANGE UP (ref 5–17)
BUN SERPL-MCNC: 32 MG/DL — HIGH (ref 7–23)
CALCIUM SERPL-MCNC: 9.8 MG/DL — SIGNIFICANT CHANGE UP (ref 8.4–10.5)
CHLORIDE SERPL-SCNC: 106 MMOL/L — SIGNIFICANT CHANGE UP (ref 96–108)
CO2 SERPL-SCNC: 25 MMOL/L — SIGNIFICANT CHANGE UP (ref 22–31)
CREAT SERPL-MCNC: 1.44 MG/DL — HIGH (ref 0.5–1.3)
GLUCOSE SERPL-MCNC: 117 MG/DL — HIGH (ref 70–99)
POTASSIUM SERPL-MCNC: 4.3 MMOL/L — SIGNIFICANT CHANGE UP (ref 3.5–5.3)
POTASSIUM SERPL-SCNC: 4.3 MMOL/L — SIGNIFICANT CHANGE UP (ref 3.5–5.3)
SODIUM SERPL-SCNC: 144 MMOL/L — SIGNIFICANT CHANGE UP (ref 135–145)

## 2019-06-27 RX ADMIN — Medication 100 MILLIGRAM(S): at 22:06

## 2019-06-27 RX ADMIN — AMLODIPINE BESYLATE 5 MILLIGRAM(S): 2.5 TABLET ORAL at 05:10

## 2019-06-27 RX ADMIN — Medication 100 MILLIGRAM(S): at 13:06

## 2019-06-27 RX ADMIN — APIXABAN 5 MILLIGRAM(S): 2.5 TABLET, FILM COATED ORAL at 17:38

## 2019-06-27 RX ADMIN — APIXABAN 5 MILLIGRAM(S): 2.5 TABLET, FILM COATED ORAL at 05:10

## 2019-06-27 RX ADMIN — POLYETHYLENE GLYCOL 3350 17 GRAM(S): 17 POWDER, FOR SOLUTION ORAL at 13:07

## 2019-06-27 RX ADMIN — TRAMADOL HYDROCHLORIDE 25 MILLIGRAM(S): 50 TABLET ORAL at 15:43

## 2019-06-27 RX ADMIN — TRAMADOL HYDROCHLORIDE 25 MILLIGRAM(S): 50 TABLET ORAL at 16:40

## 2019-06-27 RX ADMIN — SIMVASTATIN 20 MILLIGRAM(S): 20 TABLET, FILM COATED ORAL at 22:06

## 2019-06-27 RX ADMIN — Medication 975 MILLIGRAM(S): at 05:10

## 2019-06-27 RX ADMIN — Medication 975 MILLIGRAM(S): at 22:06

## 2019-06-27 RX ADMIN — Medication 975 MILLIGRAM(S): at 07:26

## 2019-06-27 RX ADMIN — SENNA PLUS 2 TABLET(S): 8.6 TABLET ORAL at 22:06

## 2019-06-27 RX ADMIN — Medication 325 MILLIGRAM(S): at 13:06

## 2019-06-27 RX ADMIN — LIDOCAINE 1 PATCH: 4 CREAM TOPICAL at 19:00

## 2019-06-27 RX ADMIN — LIDOCAINE 1 PATCH: 4 CREAM TOPICAL at 17:06

## 2019-06-27 RX ADMIN — Medication 5 MILLIGRAM(S): at 13:06

## 2019-06-27 RX ADMIN — Medication 975 MILLIGRAM(S): at 13:06

## 2019-06-27 RX ADMIN — PANTOPRAZOLE SODIUM 40 MILLIGRAM(S): 20 TABLET, DELAYED RELEASE ORAL at 07:27

## 2019-06-27 RX ADMIN — LOSARTAN POTASSIUM 100 MILLIGRAM(S): 100 TABLET, FILM COATED ORAL at 05:10

## 2019-06-27 RX ADMIN — Medication 100 MILLIGRAM(S): at 05:10

## 2019-06-27 NOTE — PROGRESS NOTE ADULT - SUBJECTIVE AND OBJECTIVE BOX
Orthopedic Surgery Progress Note    Patient seen and examined this morning. No acute events overnight. Evaluated by vascular surgery for right carotid artery stenosis    O:  Vital Signs Last 24 Hrs  T(C): 36.7 (27 Jun 2019 04:16), Max: 36.9 (26 Jun 2019 11:24)  T(F): 98.1 (27 Jun 2019 04:16), Max: 98.5 (26 Jun 2019 11:24)  HR: 69 (27 Jun 2019 04:16) (69 - 76)  BP: 147/69 (27 Jun 2019 05:08) (110/68 - 147/69)  BP(mean): --  RR: 18 (27 Jun 2019 04:16) (17 - 18)  SpO2: 98% (27 Jun 2019 04:16) (95% - 98%)        Gen: Alert and oriented x3, NAD, patient resting in bed  Facial droop and mild dysarthria present  RUE: 5/5, SILT  WWP distally  LUE: Intrinsics 2/5, otherwise 4/5, SILT  WWP distally  LUE: 5/5, SILT  WWP distally  RLE:  Dressing C/D/I  EHL/FHL 5/5, TA/GS 2/5  SILT DP/SP/Serrano/Sa  WWP distally    Labs:                        9.6    8.31  )-----------( 198      ( 26 Jun 2019 08:17 )             31.0                         8.7    7.52  )-----------( 161      ( 25 Jun 2019 10:04 )             28.5       06-26    140  |  106  |  27<H>  ----------------------------<  130<H>  4.6   |  24  |  1.31<H>            A/P 89y year old female s/p Hemiarthroplasty, hip    Pain Control  DVT PPX  PT/OOB  WBAT   Dispo Planning

## 2019-06-27 NOTE — PROGRESS NOTE ADULT - SUBJECTIVE AND OBJECTIVE BOX
THE PATIENT WAS SEEN AND EXAMINED BY ME WITH THE HOUSESTAFF AND STROKE TEAM DURING MORNING ROUNDS.   HPI:  89y Female presents to [facility] ED s/p mech fall in the kitchen onto R hip when right leg gave way, now c/o severe right hip pain and inability to ambulate.  Patient denies headstrike or LOC. Localizes pain to right hip/femur/knee. Patient denies radiation of pain. Patient denies numbness/tingling/burning in the RLE. No other bone/joint complaints. Patient is a community ambulator at baseline with a rolling walker. Patient has no issues w/ ADLs/IADLs. (22 Jun 2019 04:04)      SUBJECTIVE: No events overnight.  No new neurologic complaints.      acetaminophen   Tablet .. 975 milliGRAM(s) Oral every 8 hours  amLODIPine   Tablet 5 milliGRAM(s) Oral daily  apixaban 5 milliGRAM(s) Oral two times a day  aspirin 325 milliGRAM(s) Oral daily  bisacodyl Suppository 10 milliGRAM(s) Rectal daily PRN  docusate sodium 100 milliGRAM(s) Oral three times a day  lidocaine   Patch 1 Patch Transdermal every 24 hours  losartan 100 milliGRAM(s) Oral daily  magnesium hydroxide Suspension 30 milliLiter(s) Oral daily PRN  ondansetron Injectable 4 milliGRAM(s) IV Push every 6 hours PRN  oxybutynin 5 milliGRAM(s) Oral daily  pantoprazole    Tablet 40 milliGRAM(s) Oral before breakfast  polyethylene glycol 3350 17 Gram(s) Oral daily  senna 2 Tablet(s) Oral at bedtime  simvastatin 20 milliGRAM(s) Oral at bedtime  traMADol 25 milliGRAM(s) Oral every 4 hours PRN  traMADol 50 milliGRAM(s) Oral every 4 hours PRN      PHYSICAL EXAM:   Vital Signs Last 24 Hrs  T(C): 36.7 (27 Jun 2019 09:40), Max: 36.7 (27 Jun 2019 04:16)  T(F): 98 (27 Jun 2019 09:40), Max: 98.1 (27 Jun 2019 04:16)  HR: 72 (27 Jun 2019 09:40) (69 - 76)  BP: 118/65 (27 Jun 2019 09:40) (110/68 - 147/69)  BP(mean): --  RR: 18 (27 Jun 2019 09:40) (18 - 18)  SpO2: 98% (27 Jun 2019 09:40) (95% - 98%)        General:  Elderly female, laying in bed. No acute distress  HEENT: Normocephalic, atraumatic.  EOM intact, visual fields full  Abdomen: Soft, nontender, nondistended   Extremities: No edema    NEUROLOGICAL EXAM:  Mental status: Awake, alert, oriented x3. Speech is fluent, no aphasia.  Cranial Nerves: Nasolabial flattening on left side, No nystagmus, no dysarthria, no dysphagia. Tongue is  midline, shoulder shrug intact bilaterally. Decreased sensation to LT in the left CN V1-3  Motor exam: Normal tone, no drift, 5/5 RUE, 4/5 RLE, 1/5 LUE, 5/5 LLE.   Sensation: Decreased sensation to LT in the left UE, No extinction.   Coordination/ Gait: Weakness toe LUE on fine finger movements  Gait not assessed on bedrest.      LABS:                        9.6    8.31  )-----------( 198      ( 26 Jun 2019 08:17 )             31.0    06-27    144  |  106  |  32<H>  ----------------------------<  117<H>  4.3   |  25  |  1.44<H>    Ca    9.8      27 Jun 2019 06:08      Hemoglobin A1C, Whole Blood: 5.6 % (06-26 @ 08:17)      IMAGING: Reviewed by me.         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   There is no hemorrhagic transformation.  No abnormal parenchymal or leptomeningeal enhancement.

## 2019-06-27 NOTE — PROGRESS NOTE ADULT - ASSESSMENT
A/P 89y year old female s/p Hemiarthroplasty, now with acute ischemic stroke presenting with L side weakness, numbness appears to be resolving.     Pain Control  DVT PPX - ASA BID  Afib -cont Eliquis  PT/OOB  WBAT   FU MRA  FU vascular surgery  Appreciate care per primary team  WBAT RLE  PT/OT  Follow up with Dr. Muhammad in 1-2 weeks following discharge

## 2019-06-27 NOTE — PROGRESS NOTE ADULT - SUBJECTIVE AND OBJECTIVE BOX
Patient is a 89y old  Female who presents with a chief complaint of hip fracture.  s/p mech fall in the kitchen onto R hip when right leg gave way, now c/o severe right hip pain and inability to ambulate.  Patient denies headstrike or LOC. Localizes pain to right hip/femur/knee. Patient denies radiation of pain. Patient denies numbness/tingling/burning in the RLE. No other bone/joint complaints. Patient is a community ambulator at baseline with a rolling walker. Patient has no issues w/ ADLs/IADLs. The patient underwent a right hemiarthroplasty ORIF surgery on 2019. The patient is now postop, feeling well and complaining of moderate right hip incisional pain. When last seen on the morning of 19, she is wanted to be out of bed to chair and begin ambulation. She had an uneventful day until 6:30 PM when she noted weakness of her left hand and altered ability to move her fingers.  Her daughter noticed facial weakness and slurring of words.  No staff was notified and the patient remained this way until next examined by physical therapy on the morning of 19.  She was found to have  significant left hemiparesis of the left arm greater than left leg, with facial droop and difficulty articulating.  The patient was alert and oriented x3 and she answered questions appropriately.  She had clear weakness in the left arm and left leg as compared to the day before.  EKG was obtained and she was in normal sinus rhythm,  with no ST-T wave changes. Blood pressure was low between 100- 120 systolic.  Blood pressure medications were held, unless blood pressure was greater than 130 systolic.  The patient had a neurological consultation and  right brain CVA was suspected.  The patient underwent CT of the brain at this time showed no hemorrhages or masses.  There was no hydrocephalus.  Possibility of infarction was present and an MRI was advised.  After the CT scan of the brain the patient received 325 mg of aspirin.  It was now approximately 16 hours after the onset of left hemiparesis.  The patient was transferred to a monitored bed to rule out  episodic paroxysmal atrial fibrillation that could have caused her  acute CVA.  The patient had previously been on 2.5 mg Eliquis twice a day and dosage wasnow increased to 5 mg twice a day for therapeutic anticoagulation.  Her rhythm has remained  NSR with a pulse of 64.  The patient has no pain and with the exception of left-sided weakness, has no changes in her condition.  She complains bitterly of right leg postoperative pain  The patient has no new medical complaints at this time. 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  The patient and daughter were fully  informed of right carotid critical stenosis. Telemetry continues with NSR  and no arrhythmias    Spoke with Dr Acosta [facility]  >70% stenosis to have MRA of brain and carotid and Dr Carnes vasular surgery to see patient  for possible surgical intervention.        MEDICATIONS  (STANDING):  acetaminophen   Tablet .. 975 milliGRAM(s) Oral every 8 hours  amLODIPine   Tablet 5 milliGRAM(s) Oral daily  apixaban 5 milliGRAM(s) Oral two times a day  aspirin 325 milliGRAM(s) Oral daily  docusate sodium 100 milliGRAM(s) Oral three times a day  lidocaine   Patch 1 Patch Transdermal every 24 hours  losartan 100 milliGRAM(s) Oral daily  oxybutynin 5 milliGRAM(s) Oral daily  pantoprazole    Tablet 40 milliGRAM(s) Oral before breakfast  polyethylene glycol 3350 17 Gram(s) Oral daily  senna 2 Tablet(s) Oral at bedtime  simvastatin 20 milliGRAM(s) Oral at bedtime    MEDICATIONS  (PRN):  bisacodyl Suppository 10 milliGRAM(s) Rectal daily PRN If no bowel movement  magnesium hydroxide Suspension 30 milliLiter(s) Oral daily PRN Constipation  mirtazapine 15 milliGRAM(s) Oral at bedtime PRN Insomnia  ondansetron Injectable 4 milliGRAM(s) IV Push every 6 hours PRN Nausea and/or Vomiting  traMADol 25 milliGRAM(s) Oral every 4 hours PRN Moderate Pain (4 - 6)  traMADol 50 milliGRAM(s) Oral every 4 hours PRN Severe Pain (7 - 10)        Allergies    No Known Allergies    Intolerances      Vital Signs Last 24 Hrs  T(C): 36.9 (2019 11:24), Max: 37.4 (2019 04:23)  T(F): 98.5 (2019 11:24), Max: 99.3 (2019 04:23)  HR: 73 (2019 11:24) (73 - 82)  BP: 117/70 (2019 11:24) (117/70 - 153/55)  BP(mean): --  RR: 17 (2019 11:24) (17 - 18)  SpO2: 96% (2019 11:24) (87% - 98%)    PHYSICAL EXAM:  GENERAL: NAD, well nourished and conversant  HEAD:  Atraumatic  (+)  > 70% stenosis Right ICA  EYES: EOM, PERRLA, conjunctiva pink and sclera white  ENT: No tonsillar erythema, exudates, or enlargement, moist mucous membranes, good dentition, no lesions  NECK: Supple, No JVD, normal thyroid, carotids with normal upstrokes and no bruits  CHEST/LUNG: Clear to auscultation bilaterally, No rales, rhonchi, wheezing, or rubs  HEART: Regular rate and rhythm, No murmurs, rubs, or gallops  ABDOMEN: Soft, nondistended, no masses, guarding, tenderness or rebound, bowel sounds present  EXTREMITIES:  2+ Peripheral Pulses, No clubbing, cyanosis, or edema. No arthritis of shoulders, elbows, hands, hips, knees, ankles, or feet. No DJD C spine, T spine, or L/S spine  LYMPH: No lymphadenopathy noted  SKIN: No rashes or lesions  NERVOUS SYSTEM:  Alert & Oriented X3, normal cognitive function. Motor Strength 5/5 right upper and right lower.  4/5 left upper and left lower extremities, DTRs 2+ intact and symmetric            CBC Full  -  ( 2019 08:17 )  WBC Count : 8.31 K/uL  RBC Count : 3.20 M/uL  Hemoglobin : 9.6 g/dL  Hematocrit : 31.0 %  Platelet Count - Automated : 198 K/uL  Mean Cell Volume : 96.9 fl  Mean Cell Hemoglobin : 30.0 pg  Mean Cell Hemoglobin Concentration : 31.0 gm/dL  Auto Neutrophil # : x  Auto Lymphocyte # : x  Auto Monocyte # : x  Auto Eosinophil # : x  Auto Basophil # : x  Auto Neutrophil % : x  Auto Lymphocyte % : x  Auto Monocyte % : x  Auto Eosinophil % : x  Auto Basophil % : x    06-    140  |  106  |  27<H>  ----------------------------<  130<H>  4.6   |  24  |  1.31<H>    Ca    9.2      2019 06:15          Urinalysis Basic - ( 2019 00:30 )    Color: Light Yellow / Appearance: Clear / S.013 / pH: x  Gluc: x / Ketone: Negative  / Bili: Negative / Urobili: Negative   Blood: x / Protein: Trace / Nitrite: Negative   Leuk Esterase: Negative / RBC: 1 /hpf / WBC 1 /HPF   Sq Epi: x / Non Sq Epi: 1 /hpf / Bacteria: Negative        CBC Full  -  ( 2019 10:04 )  WBC Count : 7.52 K/uL  RBC Count : 2.87 M/uL  Hemoglobin : 8.7 g/dL  Hematocrit : 28.5 %  Platelet Count - Automated : 161 K/uL  Mean Cell Volume : 99.3 fl  Mean Cell Hemoglobin : 30.3 pg  Mean Cell Hemoglobin Concentration : 30.5 gm/dL  Auto Neutrophil # : x  Auto Lymphocyte # : x  Auto Monocyte # : x  Auto Eosinophil # : x  Auto Basophil # : x  Auto Neutrophil % : x  Auto Lymphocyte % : x  Auto Monocyte % : x  Auto Eosinophil % : x  Auto Basophil % : x        141  |  106  |  28<H>  ----------------------------<  107<H>  4.4   |  23  |  1.34<H>    Ca    9.0      2019 07:01          Urinalysis Basic - ( 2019 00:30 )    Color: Light Yellow / Appearance: Clear / S.013 / pH: x  Gluc: x / Ketone: Negative  / Bili: Negative / Urobili: Negative   Blood: x / Protein: Trace / Nitrite: Negative   Leuk Esterase: Negative / RBC: 1 /hpf / WBC 1 /HPF   Sq Epi: x / Non Sq Epi: 1 /hpf / Bacteria: Negative                  RADIOLOGY & ADDITIONAL TESTS:      Consultant(s):    Care Discussed with Consultants/Other Providers [ ] YES  [ ] NO Patient is a 89y old  Female who presents with a chief complaint of hip fracture.  s/p mech fall in the kitchen onto R hip when right leg gave way, now c/o severe right hip pain and inability to ambulate.  Patient denies headstrike or LOC. Localizes pain to right hip/femur/knee. Patient denies radiation of pain. Patient denies numbness/tingling/burning in the RLE. No other bone/joint complaints. Patient is a community ambulator at baseline with a rolling walker. Patient has no issues w/ ADLs/IADLs. The patient underwent a right hemiarthroplasty ORIF surgery on 6/22/2019. The patient is now postop, feeling well and complaining of moderate right hip incisional pain. When last seen on the morning of 6/23/19, she is wanted to be out of bed to chair and begin ambulation. She had an uneventful day until 6:30 PM when she noted weakness of her left hand and altered ability to move her fingers.  Her daughter noticed facial weakness and slurring of words.  No staff was notified and the patient remained this way until next examined by physical therapy on the morning of 6/24/19.  She was found to have  significant left hemiparesis of the left arm greater than left leg, with facial droop and difficulty articulating.  The patient was alert and oriented x3 and she answered questions appropriately.  She had clear weakness in the left arm and left leg as compared to the day before.  EKG was obtained and she was in normal sinus rhythm,  with no ST-T wave changes. Blood pressure was low between 100- 120 systolic.  Blood pressure medications were held, unless blood pressure was greater than 130 systolic.  The patient had a neurological consultation and  right brain CVA was suspected.  The patient underwent CT of the brain at this time showed no hemorrhages or masses.  There was no hydrocephalus.  Possibility of infarction was present and an MRI was advised.  After the CT scan of the brain the patient received 325 mg of aspirin.  It was now approximately 16 hours after the onset of left hemiparesis.  The patient was transferred to a monitored bed to rule out  episodic paroxysmal atrial fibrillation that could have caused her  acute CVA.  The patient had previously been on 2.5 mg Eliquis twice a day and dosage wasnow increased to 5 mg twice a day for therapeutic anticoagulation.  Her rhythm has remained  NSR with a pulse of 64.  The patient has no pain and with the exception of left-sided weakness, has no changes in her condition.  She complains bitterly of right leg postoperative pain  The patient has no new medical complaints at this time. 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  The patient and daughter were fully  informed of right carotid critical stenosis. Telemetry continues with NSR  and no arrhythmias    Spoke with Dr Acosta V[facility]  >70% stenosis to have MRA of brain and carotid and Dr Carnes vasular surgery to see patient  for possible surgical intervention.        MEDICATIONS  (STANDING):  acetaminophen   Tablet .. 975 milliGRAM(s) Oral every 8 hours  amLODIPine   Tablet 5 milliGRAM(s) Oral daily  apixaban 5 milliGRAM(s) Oral two times a day  aspirin 325 milliGRAM(s) Oral daily  docusate sodium 100 milliGRAM(s) Oral three times a day  lidocaine   Patch 1 Patch Transdermal every 24 hours  losartan 100 milliGRAM(s) Oral daily  oxybutynin 5 milliGRAM(s) Oral daily  pantoprazole    Tablet 40 milliGRAM(s) Oral before breakfast  polyethylene glycol 3350 17 Gram(s) Oral daily  senna 2 Tablet(s) Oral at bedtime  simvastatin 20 milliGRAM(s) Oral at bedtime    MEDICATIONS  (PRN):  bisacodyl Suppository 10 milliGRAM(s) Rectal daily PRN If no bowel movement  magnesium hydroxide Suspension 30 milliLiter(s) Oral daily PRN Constipation  ondansetron Injectable 4 milliGRAM(s) IV Push every 6 hours PRN Nausea and/or Vomiting  traMADol 25 milliGRAM(s) Oral every 4 hours PRN Moderate Pain (4 - 6)  traMADol 50 milliGRAM(s) Oral every 4 hours PRN Severe Pain (7 - 10)      Allergies    No Known Allergies    Intolerances      Vital Signs Last 24 Hrs  T(C): 36.7 (27 Jun 2019 09:40), Max: 36.9 (26 Jun 2019 11:24)  T(F): 98 (27 Jun 2019 09:40), Max: 98.5 (26 Jun 2019 11:24)  HR: 72 (27 Jun 2019 09:40) (69 - 76)  BP: 118/65 (27 Jun 2019 09:40) (110/68 - 147/69)  BP(mean): --  RR: 18 (27 Jun 2019 09:40) (17 - 18)  SpO2: 98% (27 Jun 2019 09:40) (95% - 98%)    PHYSICAL EXAM:  GENERAL: NAD, well nourished and conversant  HEAD:  Atraumatic  (+)  > 70% stenosis Right ICA  EYES: EOM, PERRLA, conjunctiva pink and sclera white  ENT: No tonsillar erythema, exudates, or enlargement, moist mucous membranes, good dentition, no lesions  NECK: Supple, No JVD, normal thyroid, carotids with normal upstrokes and no bruits  CHEST/LUNG: Clear to auscultation bilaterally, No rales, rhonchi, wheezing, or rubs  HEART: Regular rate and rhythm, No murmurs, rubs, or gallops  ABDOMEN: Soft, nondistended, no masses, guarding, tenderness or rebound, bowel sounds present  EXTREMITIES:  2+ Peripheral Pulses, No clubbing, cyanosis, or edema. No arthritis of shoulders, elbows, hands, hips, knees, ankles, or feet. No DJD C spine, T spine, or L/S spine  LYMPH: No lymphadenopathy noted  SKIN: No rashes or lesions  NERVOUS SYSTEM:  Alert & Oriented X3, normal cognitive function. Motor Strength 5/5 right upper and right lower.  4/5 left upper and left lower extremities, DTRs 2+ intact and symmetric                CBC Full  -  ( 26 Jun 2019 08:17 )  WBC Count : 8.31 K/uL  RBC Count : 3.20 M/uL  Hemoglobin : 9.6 g/dL  Hematocrit : 31.0 %  Platelet Count - Automated : 198 K/uL  Mean Cell Volume : 96.9 fl  Mean Cell Hemoglobin : 30.0 pg  Mean Cell Hemoglobin Concentration : 31.0 gm/dL  Auto Neutrophil # : x  Auto Lymphocyte # : x  Auto Monocyte # : x  Auto Eosinophil # : x  Auto Basophil # : x  Auto Neutrophil % : x  Auto Lymphocyte % : x  Auto Monocyte % : x  Auto Eosinophil % : x  Auto Basophil % : x    06-27    144  |  106  |  32<H>  ----------------------------<  117<H>  4.3   |  25  |  1.44<H>    Ca    9.8      27 Jun 2019 06:08                          RADIOLOGY & ADDITIONAL TESTS:      Consultant(s):    Care Discussed with Consultants/Other Providers [ ] YES  [ ] NO Patient is a 89y old  Female who presents with a chief complaint of hip fracture.  s/p mech fall in the kitchen onto R hip when right leg gave way, now c/o severe right hip pain and inability to ambulate.  Patient denies headstrike or LOC. Localizes pain to right hip/femur/knee. Patient denies radiation of pain. Patient denies numbness/tingling/burning in the RLE. No other bone/joint complaints. Patient is a community ambulator at baseline with a rolling walker. Patient has no issues w/ ADLs/IADLs. The patient underwent a right hemiarthroplasty ORIF surgery on 6/22/2019. The patient is now postop, feeling well and complaining of moderate right hip incisional pain. When last seen on the morning of 6/23/19, she is wanted to be out of bed to chair and begin ambulation. She had an uneventful day until 6:30 PM when she noted weakness of her left hand and altered ability to move her fingers.  Her daughter noticed facial weakness and slurring of words.  No staff was notified and the patient remained this way until next examined by physical therapy on the morning of 6/24/19.  She was found to have  significant left hemiparesis of the left arm greater than left leg, with facial droop and difficulty articulating.  The patient was alert and oriented x3 and she answered questions appropriately.  She had clear weakness in the left arm and left leg as compared to the day before.  EKG was obtained and she was in normal sinus rhythm,  with no ST-T wave changes. Blood pressure was low between 100- 120 systolic.  Blood pressure medications were held, unless blood pressure was greater than 130 systolic.  The patient had a neurological consultation and  right brain CVA was suspected.  The patient underwent CT of the brain at this time showed no hemorrhages or masses.  There was no hydrocephalus.  Possibility of infarction was present and an MRI was advised.  After the CT scan of the brain the patient received 325 mg of aspirin.  It was now approximately 16 hours after the onset of left hemiparesis.  The patient was transferred to a monitored bed to rule out  episodic paroxysmal atrial fibrillation that could have caused her  acute CVA.  The patient had previously been on 2.5 mg Eliquis twice a day and dosage wasnow increased to 5 mg twice a day for therapeutic anticoagulation.  Her rhythm has remained  NSR with a pulse of 64.  The patient has no pain and with the exception of left-sided weakness, has no changes in her condition.  She complains bitterly of right leg postoperative pain  The patient has no new medical complaints at this time. 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  The patient and daughter were fully  informed of right carotid critical stenosis. Telemetry continues with NSR  and no arrhythmias    Spoke with Dr Acosta [facility] about the patient's critical carotid stenosis on Doppler based on velocities. MRA of brain confirm the lesion at the bifurcation and antegrade flow. Seen by Dr Carnes, Vasular surgery who advises surgical intervention. Full information provided by me to patient, daughter and physician grandson who will consider and advise. Risk of addition brain events explained and are likely if there is no surgical intervention.        MEDICATIONS  (STANDING):  acetaminophen   Tablet .. 975 milliGRAM(s) Oral every 8 hours  amLODIPine   Tablet 5 milliGRAM(s) Oral daily  apixaban 5 milliGRAM(s) Oral two times a day  aspirin 325 milliGRAM(s) Oral daily  docusate sodium 100 milliGRAM(s) Oral three times a day  lidocaine   Patch 1 Patch Transdermal every 24 hours  losartan 100 milliGRAM(s) Oral daily  oxybutynin 5 milliGRAM(s) Oral daily  pantoprazole    Tablet 40 milliGRAM(s) Oral before breakfast  polyethylene glycol 3350 17 Gram(s) Oral daily  senna 2 Tablet(s) Oral at bedtime  simvastatin 20 milliGRAM(s) Oral at bedtime    MEDICATIONS  (PRN):  bisacodyl Suppository 10 milliGRAM(s) Rectal daily PRN If no bowel movement  magnesium hydroxide Suspension 30 milliLiter(s) Oral daily PRN Constipation  ondansetron Injectable 4 milliGRAM(s) IV Push every 6 hours PRN Nausea and/or Vomiting  traMADol 25 milliGRAM(s) Oral every 4 hours PRN Moderate Pain (4 - 6)  traMADol 50 milliGRAM(s) Oral every 4 hours PRN Severe Pain (7 - 10)      Allergies    No Known Allergies    Intolerances      Vital Signs Last 24 Hrs  T(C): 36.7 (27 Jun 2019 09:40), Max: 36.9 (26 Jun 2019 11:24)  T(F): 98 (27 Jun 2019 09:40), Max: 98.5 (26 Jun 2019 11:24)  HR: 72 (27 Jun 2019 09:40) (69 - 76)  BP: 118/65 (27 Jun 2019 09:40) (110/68 - 147/69)  BP(mean): --  RR: 18 (27 Jun 2019 09:40) (17 - 18)  SpO2: 98% (27 Jun 2019 09:40) (95% - 98%)    PHYSICAL EXAM:  GENERAL: NAD, well nourished and conversant  HEAD:  Atraumatic  (+)  > 70% stenosis Right ICA  EYES: EOM, PERRLA, conjunctiva pink and sclera white  ENT: No tonsillar erythema, exudates, or enlargement, moist mucous membranes, good dentition, no lesions  NECK: Supple, No JVD, normal thyroid, carotids with normal upstrokes and no bruits  CHEST/LUNG: Clear to auscultation bilaterally, No rales, rhonchi, wheezing, or rubs  HEART: Regular rate and rhythm, No murmurs, rubs, or gallops  ABDOMEN: Soft, nondistended, no masses, guarding, tenderness or rebound, bowel sounds present  EXTREMITIES:  2+ Peripheral Pulses, No clubbing, cyanosis, or edema. No arthritis of shoulders, elbows, hands, hips, knees, ankles, or feet. No DJD C spine, T spine, or L/S spine  LYMPH: No lymphadenopathy noted  SKIN: No rashes or lesions  NERVOUS SYSTEM:  Alert & Oriented X3, normal cognitive function. Motor Strength 5/5 right upper and right lower.  4/5 left upper and left lower extremities, DTRs 2+ intact and symmetric                CBC Full  -  ( 26 Jun 2019 08:17 )  WBC Count : 8.31 K/uL  RBC Count : 3.20 M/uL  Hemoglobin : 9.6 g/dL  Hematocrit : 31.0 %  Platelet Count - Automated : 198 K/uL  Mean Cell Volume : 96.9 fl  Mean Cell Hemoglobin : 30.0 pg  Mean Cell Hemoglobin Concentration : 31.0 gm/dL  Auto Neutrophil # : x  Auto Lymphocyte # : x  Auto Monocyte # : x  Auto Eosinophil # : x  Auto Basophil # : x  Auto Neutrophil % : x  Auto Lymphocyte % : x  Auto Monocyte % : x  Auto Eosinophil % : x  Auto Basophil % : x    06-27    144  |  106  |  32<H>  ----------------------------<  117<H>  4.3   |  25  |  1.44<H>    Ca    9.8      27 Jun 2019 06:08                          RADIOLOGY & ADDITIONAL TESTS:      Consultant(s):    Care Discussed with Consultants/Other Providers [ ] YES  [ ] NO

## 2019-06-27 NOTE — PROGRESS NOTE ADULT - ASSESSMENT
The patient underwent a right hemiarthroplasty ORIF surgery on 6/22/2019. The patient is now postop, feeling well and complaining of moderate right hip incisional pain.  The patient was 24 hours postop, when she developed acute left hemiparesis, arm greater than leg. he extent of her stroke and etiology has yet to be determined.    (+) right carotid stenosis > 70% Fr MRA of the carotids and brain.  Vascular surgery to evaluate for treatment options The patient underwent a right hemiarthroplasty ORIF surgery on 6/22/2019. The patient is now postop, feeling well and complaining of moderate right hip incisional pain.  The patient was 24 hours postop, when she developed acute left hemiparesis, arm greater than leg. The extent of her stroke and etiology has yet to be determined.    Spoke with Dr Dave Ricci about the patient's critical carotid stenosis on Doppler based on velocities. MRA of brain confirm the lesion at the bifurcation and antegrade flow. Seen by Dr Carnes, Vasular surgery who advises surgical intervention. Full information provided by me to patient, daughter and physician grandson who will consider and advise. Risk of addition brain events explained and are likely if there is no surgical intervention.

## 2019-06-27 NOTE — PROGRESS NOTE ADULT - SUBJECTIVE AND OBJECTIVE BOX
Patient is a 89y old  Female who presents with a chief complaint of hip fracture.  s/p mech fall in the kitchen onto R hip when right leg gave way, now c/o severe right hip pain and inability to ambulate.  Patient denies headstrike or LOC. Localizes pain to right hip/femur/knee. Patient denies radiation of pain. Patient denies numbness/tingling/burning in the RLE. No other bone/joint complaints. Patient is a community ambulator at baseline with a rolling walker. Patient has no issues w/ ADLs/IADLs. The patient underwent a right hemiarthroplasty ORIF surgery on 2019. The patient is now postop, feeling well and complaining of moderate right hip incisional pain. When last seen on the morning of 19, she is wanted to be out of bed to chair and begin ambulation. She had an uneventful day until 6:30 PM when she noted weakness of her left hand and altered ability to move her fingers.  Her daughter noticed facial weakness and slurring of words.  No staff was notified and the patient remained this way until next examined by physical therapy on the morning of 19.  She was found to have  significant left hemiparesis of the left arm greater than left leg, with facial droop and difficulty articulating.  The patient was alert and oriented x3 and she answered questions appropriately.  She had clear weakness in the left arm and left leg as compared to the day before.  EKG was obtained and she was in normal sinus rhythm,  with no ST-T wave changes. Blood pressure was low between 100- 120 systolic.  Blood pressure medications were held, unless blood pressure was greater than 130 systolic.  The patient had a neurological consultation and  right brain CVA was suspected.  The patient underwent CT of the brain at this time showed no hemorrhages or masses.  There was no hydrocephalus.  Possibility of infarction was present and an MRI was advised.  After the CT scan of the brain the patient received 325 mg of aspirin.  It was now approximately 16 hours after the onset of left hemiparesis.  The patient was transferred to a monitored bed to rule out  episodic paroxysmal atrial fibrillation that could have caused her  acute CVA.  The patient had previously been on 2.5 mg Eliquis twice a day and dosage wasnow increased to 5 mg twice a day for therapeutic anticoagulation.  Her rhythm has remained  NSR with a pulse of 64.  The patient has no pain and with the exception of left-sided weakness, has no changes in her condition.  She complains bitterly of right leg postoperative pain  The patient has no new medical complaints at this time. 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  The patient and daughter were fully  informed of right carotid critical stenosis. Telemetry continues with NSR  and no arrhythmias    Spoke with Dr Acosta Vascular  >70% stenosis to have MRA of brain and carotid and Dr Carnes vasular surgery to see patient  for possible surgical intervention.        MEDICATIONS  (STANDING):  acetaminophen   Tablet .. 975 milliGRAM(s) Oral every 8 hours  amLODIPine   Tablet 5 milliGRAM(s) Oral daily  apixaban 5 milliGRAM(s) Oral two times a day  aspirin 325 milliGRAM(s) Oral daily  docusate sodium 100 milliGRAM(s) Oral three times a day  lidocaine   Patch 1 Patch Transdermal every 24 hours  losartan 100 milliGRAM(s) Oral daily  oxybutynin 5 milliGRAM(s) Oral daily  pantoprazole    Tablet 40 milliGRAM(s) Oral before breakfast  polyethylene glycol 3350 17 Gram(s) Oral daily  senna 2 Tablet(s) Oral at bedtime  simvastatin 20 milliGRAM(s) Oral at bedtime    MEDICATIONS  (PRN):  bisacodyl Suppository 10 milliGRAM(s) Rectal daily PRN If no bowel movement  magnesium hydroxide Suspension 30 milliLiter(s) Oral daily PRN Constipation  mirtazapine 15 milliGRAM(s) Oral at bedtime PRN Insomnia  ondansetron Injectable 4 milliGRAM(s) IV Push every 6 hours PRN Nausea and/or Vomiting  traMADol 25 milliGRAM(s) Oral every 4 hours PRN Moderate Pain (4 - 6)  traMADol 50 milliGRAM(s) Oral every 4 hours PRN Severe Pain (7 - 10)        Allergies    No Known Allergies    Intolerances      Vital Signs Last 24 Hrs  T(C): 36.9 (2019 11:24), Max: 37.4 (2019 04:23)  T(F): 98.5 (2019 11:24), Max: 99.3 (2019 04:23)  HR: 73 (2019 11:24) (73 - 82)  BP: 117/70 (2019 11:24) (117/70 - 153/55)  BP(mean): --  RR: 17 (2019 11:24) (17 - 18)  SpO2: 96% (2019 11:24) (87% - 98%)    PHYSICAL EXAM:  GENERAL: NAD, well nourished and conversant  HEAD:  Atraumatic  (+)  > 70% stenosis Right ICA  EYES: EOM, PERRLA, conjunctiva pink and sclera white  ENT: No tonsillar erythema, exudates, or enlargement, moist mucous membranes, good dentition, no lesions  NECK: Supple, No JVD, normal thyroid, carotids with normal upstrokes and no bruits  CHEST/LUNG: Clear to auscultation bilaterally, No rales, rhonchi, wheezing, or rubs  HEART: Regular rate and rhythm, No murmurs, rubs, or gallops  ABDOMEN: Soft, nondistended, no masses, guarding, tenderness or rebound, bowel sounds present  EXTREMITIES:  2+ Peripheral Pulses, No clubbing, cyanosis, or edema. No arthritis of shoulders, elbows, hands, hips, knees, ankles, or feet. No DJD C spine, T spine, or L/S spine  LYMPH: No lymphadenopathy noted  SKIN: No rashes or lesions  NERVOUS SYSTEM:  Alert & Oriented X3, normal cognitive function. Motor Strength 5/5 right upper and right lower.  4/5 left upper and left lower extremities, DTRs 2+ intact and symmetric            CBC Full  -  ( 2019 08:17 )  WBC Count : 8.31 K/uL  RBC Count : 3.20 M/uL  Hemoglobin : 9.6 g/dL  Hematocrit : 31.0 %  Platelet Count - Automated : 198 K/uL  Mean Cell Volume : 96.9 fl  Mean Cell Hemoglobin : 30.0 pg  Mean Cell Hemoglobin Concentration : 31.0 gm/dL  Auto Neutrophil # : x  Auto Lymphocyte # : x  Auto Monocyte # : x  Auto Eosinophil # : x  Auto Basophil # : x  Auto Neutrophil % : x  Auto Lymphocyte % : x  Auto Monocyte % : x  Auto Eosinophil % : x  Auto Basophil % : x    06-    140  |  106  |  27<H>  ----------------------------<  130<H>  4.6   |  24  |  1.31<H>    Ca    9.2      2019 06:15          Urinalysis Basic - ( 2019 00:30 )    Color: Light Yellow / Appearance: Clear / S.013 / pH: x  Gluc: x / Ketone: Negative  / Bili: Negative / Urobili: Negative   Blood: x / Protein: Trace / Nitrite: Negative   Leuk Esterase: Negative / RBC: 1 /hpf / WBC 1 /HPF   Sq Epi: x / Non Sq Epi: 1 /hpf / Bacteria: Negative        CBC Full  -  ( 2019 10:04 )  WBC Count : 7.52 K/uL  RBC Count : 2.87 M/uL  Hemoglobin : 8.7 g/dL  Hematocrit : 28.5 %  Platelet Count - Automated : 161 K/uL  Mean Cell Volume : 99.3 fl  Mean Cell Hemoglobin : 30.3 pg  Mean Cell Hemoglobin Concentration : 30.5 gm/dL  Auto Neutrophil # : x  Auto Lymphocyte # : x  Auto Monocyte # : x  Auto Eosinophil # : x  Auto Basophil # : x  Auto Neutrophil % : x  Auto Lymphocyte % : x  Auto Monocyte % : x  Auto Eosinophil % : x  Auto Basophil % : x        141  |  106  |  28<H>  ----------------------------<  107<H>  4.4   |  23  |  1.34<H>    Ca    9.0      2019 07:01          Urinalysis Basic - ( 2019 00:30 )    Color: Light Yellow / Appearance: Clear / S.013 / pH: x  Gluc: x / Ketone: Negative  / Bili: Negative / Urobili: Negative   Blood: x / Protein: Trace / Nitrite: Negative   Leuk Esterase: Negative / RBC: 1 /hpf / WBC 1 /HPF   Sq Epi: x / Non Sq Epi: 1 /hpf / Bacteria: Negative                  RADIOLOGY & ADDITIONAL TESTS:      Consultant(s):    Care Discussed with Consultants/Other Providers [ ] YES  [ ] NO Patient is a 89y old  Female who presents with a chief complaint of hip fracture.  s/p mech fall in the kitchen onto R hip when right leg gave way, now c/o severe right hip pain and inability to ambulate.  Patient denies headstrike or LOC. Localizes pain to right hip/femur/knee. Patient denies radiation of pain. Patient denies numbness/tingling/burning in the RLE. No other bone/joint complaints. Patient is a community ambulator at baseline with a rolling walker. Patient has no issues w/ ADLs/IADLs. The patient underwent a right hemiarthroplasty ORIF surgery on 6/22/2019. The patient is now postop, feeling well and complaining of moderate right hip incisional pain. When last seen on the morning of 6/23/19, she is wanted to be out of bed to chair and begin ambulation. She had an uneventful day until 6:30 PM when she noted weakness of her left hand and altered ability to move her fingers.  Her daughter noticed facial weakness and slurring of words.  No staff was notified and the patient remained this way until next examined by physical therapy on the morning of 6/24/19.  She was found to have  significant left hemiparesis of the left arm greater than left leg, with facial droop and difficulty articulating.  The patient was alert and oriented x3 and she answered questions appropriately.  She had clear weakness in the left arm and left leg as compared to the day before.  EKG was obtained and she was in normal sinus rhythm,  with no ST-T wave changes. Blood pressure was low between 100- 120 systolic.  Blood pressure medications were held, unless blood pressure was greater than 130 systolic.  The patient had a neurological consultation and  right brain CVA was suspected.  The patient underwent CT of the brain at this time showed no hemorrhages or masses.  There was no hydrocephalus.  Possibility of infarction was present and an MRI was advised.  After the CT scan of the brain the patient received 325 mg of aspirin.  It was now approximately 16 hours after the onset of left hemiparesis.  The patient was transferred to a monitored bed to rule out  episodic paroxysmal atrial fibrillation that could have caused her  acute CVA.  The patient had previously been on 2.5 mg Eliquis twice a day and dosage wasnow increased to 5 mg twice a day for therapeutic anticoagulation.  Her rhythm has remained  NSR with a pulse of 64.  The patient has no pain and with the exception of left-sided weakness, has no changes in her condition.  She complains bitterly of right leg postoperative pain  The patient has no new medical complaints at this time. 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  The patient and daughter were fully  informed of right carotid critical stenosis. Telemetry continues with NSR  and no arrhythmias    Spoke with Dr Acosta Vascular  >70% stenosis to have MRA of brain and carotid and Dr Carnes vasular surgery to see patient  for possible surgical intervention.        MEDICATIONS  (STANDING):  acetaminophen   Tablet .. 975 milliGRAM(s) Oral every 8 hours  amLODIPine   Tablet 5 milliGRAM(s) Oral daily  apixaban 5 milliGRAM(s) Oral two times a day  aspirin 325 milliGRAM(s) Oral daily  docusate sodium 100 milliGRAM(s) Oral three times a day  lidocaine   Patch 1 Patch Transdermal every 24 hours  losartan 100 milliGRAM(s) Oral daily  oxybutynin 5 milliGRAM(s) Oral daily  pantoprazole    Tablet 40 milliGRAM(s) Oral before breakfast  polyethylene glycol 3350 17 Gram(s) Oral daily  senna 2 Tablet(s) Oral at bedtime  simvastatin 20 milliGRAM(s) Oral at bedtime    MEDICATIONS  (PRN):  bisacodyl Suppository 10 milliGRAM(s) Rectal daily PRN If no bowel movement  magnesium hydroxide Suspension 30 milliLiter(s) Oral daily PRN Constipation  ondansetron Injectable 4 milliGRAM(s) IV Push every 6 hours PRN Nausea and/or Vomiting  traMADol 25 milliGRAM(s) Oral every 4 hours PRN Moderate Pain (4 - 6)  traMADol 50 milliGRAM(s) Oral every 4 hours PRN Severe Pain (7 - 10)      Allergies    No Known Allergies    Intolerances      Vital Signs Last 24 Hrs  T(C): 36.7 (27 Jun 2019 09:40), Max: 36.9 (26 Jun 2019 11:24)  T(F): 98 (27 Jun 2019 09:40), Max: 98.5 (26 Jun 2019 11:24)  HR: 72 (27 Jun 2019 09:40) (69 - 76)  BP: 118/65 (27 Jun 2019 09:40) (110/68 - 147/69)  BP(mean): --  RR: 18 (27 Jun 2019 09:40) (17 - 18)  SpO2: 98% (27 Jun 2019 09:40) (95% - 98%)    PHYSICAL EXAM:  GENERAL: NAD, well nourished and conversant  HEAD:  Atraumatic  (+)  > 70% stenosis Right ICA  EYES: EOM, PERRLA, conjunctiva pink and sclera white  ENT: No tonsillar erythema, exudates, or enlargement, moist mucous membranes, good dentition, no lesions  NECK: Supple, No JVD, normal thyroid, carotids with normal upstrokes and no bruits  CHEST/LUNG: Clear to auscultation bilaterally, No rales, rhonchi, wheezing, or rubs  HEART: Regular rate and rhythm, No murmurs, rubs, or gallops  ABDOMEN: Soft, nondistended, no masses, guarding, tenderness or rebound, bowel sounds present  EXTREMITIES:  2+ Peripheral Pulses, No clubbing, cyanosis, or edema. No arthritis of shoulders, elbows, hands, hips, knees, ankles, or feet. No DJD C spine, T spine, or L/S spine  LYMPH: No lymphadenopathy noted  SKIN: No rashes or lesions  NERVOUS SYSTEM:  Alert & Oriented X3, normal cognitive function. Motor Strength 5/5 right upper and right lower.  4/5 left upper and left lower extremities, DTRs 2+ intact and symmetric            CBC Full  -  ( 26 Jun 2019 08:17 )  WBC Count : 8.31 K/uL  RBC Count : 3.20 M/uL  Hemoglobin : 9.6 g/dL  Hematocrit : 31.0 %  Platelet Count - Automated : 198 K/uL  Mean Cell Volume : 96.9 fl  Mean Cell Hemoglobin : 30.0 pg  Mean Cell Hemoglobin Concentration : 31.0 gm/dL  Auto Neutrophil # : x  Auto Lymphocyte # : x  Auto Monocyte # : x  Auto Eosinophil # : x  Auto Basophil # : x  Auto Neutrophil % : x  Auto Lymphocyte % : x  Auto Monocyte % : x  Auto Eosinophil % : x  Auto Basophil % : x    06-27    144  |  106  |  32<H>  ----------------------------<  117<H>  4.3   |  25  |  1.44<H>    Ca    9.8      27 Jun 2019 06:08                          RADIOLOGY & ADDITIONAL TESTS:      Consultant(s):    Care Discussed with Consultants/Other Providers [ ] YES  [ ] NO

## 2019-06-27 NOTE — PROGRESS NOTE ADULT - SUBJECTIVE AND OBJECTIVE BOX
Patient is  seen  Sleepy most of the time  MRA reviewed  It shows  severe  right ICA stenosis  Medically speaking  right CEA  is indicated  Again  patient  has not   gotten OOB  or  ambulated  from  her Hip  surgery  She is on eliquis  still  for AFib  Patient  does not  want  to have any  procedure  currently  As far as waiting  to see if  she becomes  independant  and  ambulating  it is  reasonable  to wait  to do right  CEA  Please  call me  when  a decision is  made   her  children present  at bedside

## 2019-06-27 NOTE — PROGRESS NOTE ADULT - ATTENDING COMMENTS
The patient suffered an acute right CVA 24, hours postop. Severe right carotid stenosis seen on Doppler. The patient is now transferred to the medical service for cardiac monitoring and full neurological evaluation.  Patient received 325 mg of aspirin and Eliquis is increased to 5 mg twice a day. Vascular surgical consultation requested.  Carotid Right side 70%  stenosis  - to have MRA of carotids and brain The patient suffered an acute right CVA 24, hours postop. Severe right carotid stenosis seen on Doppler. The patient is now transferred to the medical service for cardiac monitoring and full neurological evaluation.  Patient received 325 mg of aspirin and Eliquis is increased to 5 mg twice a day. Vascular surgical consultation obtained. Spoke with Dr Acosta Vascular about the patient's critical carotid stenosis on Doppler based on velocities. MRA of brain confirm the lesion at the bifurcation and antegrade flow. Seen by Dr Carnes, Vasular surgery who advises surgical intervention. Full information provided by me to patient, daughter and physician grandson who will consider and advise. Risk of addition brain events explained and are likely if there is no surgical intervention.

## 2019-06-27 NOTE — PROGRESS NOTE ADULT - ASSESSMENT
ASSESSMENT: This is a 89ywoman with a PMH of HTN, HLD, A-Fib (on Eliquis) and Polio (w/ residual LLE 1/5 strength) who is admitted for right femoral neck fracture after a slip and fall. Neurology consulted for new onset left facial droop, RUE weakness w/ decreased sensation. Exam initially showed Left nasolabial flattening with JACKIE 4/5 and decreased sensation on the left with upgoing left toe.  MRI brain - small acute right MCA territory infarcts. Carotid doppler showing right ICA stenosis. MRA reveals marked ICA stenosis, awaiting official read. TTE unremarkable.    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    NEURO: Patient with R ICA presumed symptomatic stenosis. Competing mechanism would be PAF having been off AC for hip repair. Patient has since resumed oral anticoagulant.  Results of MRA indicate marked stenosis Right ICA, awaiting official read.  Spoke with family and patient at length regarding risks and benefits of surgical intervention. Discussed stenting vs. CEA. Patient at this time, declining surgery, would like more time to consider risk benefits that have been discussed. Family anticipating return of vascular attending today. Continue close monitoring for neurologic deterioration, permissive HTN. with gradual return to normotension, avoid hypotension. Titrate statin to LDL goal less than 70. Recommend  Physical therapy/OT as tolerated. Plan of care discussed with patient and family at bedside. All questions answered to the best of ability.

## 2019-06-27 NOTE — PROGRESS NOTE ADULT - NSHPATTENDINGPLANDISCUSS_GEN_ALL_CORE
patient, her daughter and the orthopedic physician's assistant. patient, her daughter and her physician grandson.

## 2019-06-28 LAB
ANION GAP SERPL CALC-SCNC: 7 MMOL/L — SIGNIFICANT CHANGE UP (ref 5–17)
BUN SERPL-MCNC: 39 MG/DL — HIGH (ref 7–23)
CALCIUM SERPL-MCNC: 9.5 MG/DL — SIGNIFICANT CHANGE UP (ref 8.4–10.5)
CHLORIDE SERPL-SCNC: 106 MMOL/L — SIGNIFICANT CHANGE UP (ref 96–108)
CO2 SERPL-SCNC: 27 MMOL/L — SIGNIFICANT CHANGE UP (ref 22–31)
CREAT SERPL-MCNC: 1.47 MG/DL — HIGH (ref 0.5–1.3)
GLUCOSE SERPL-MCNC: 109 MG/DL — HIGH (ref 70–99)
POTASSIUM SERPL-MCNC: 4.6 MMOL/L — SIGNIFICANT CHANGE UP (ref 3.5–5.3)
POTASSIUM SERPL-SCNC: 4.6 MMOL/L — SIGNIFICANT CHANGE UP (ref 3.5–5.3)
SODIUM SERPL-SCNC: 140 MMOL/L — SIGNIFICANT CHANGE UP (ref 135–145)

## 2019-06-28 RX ADMIN — Medication 100 MILLIGRAM(S): at 05:21

## 2019-06-28 RX ADMIN — Medication 100 MILLIGRAM(S): at 13:01

## 2019-06-28 RX ADMIN — Medication 975 MILLIGRAM(S): at 05:24

## 2019-06-28 RX ADMIN — Medication 975 MILLIGRAM(S): at 13:01

## 2019-06-28 RX ADMIN — Medication 975 MILLIGRAM(S): at 05:21

## 2019-06-28 RX ADMIN — LIDOCAINE 1 PATCH: 4 CREAM TOPICAL at 05:24

## 2019-06-28 RX ADMIN — MAGNESIUM HYDROXIDE 30 MILLILITER(S): 400 TABLET, CHEWABLE ORAL at 12:49

## 2019-06-28 RX ADMIN — Medication 975 MILLIGRAM(S): at 21:54

## 2019-06-28 RX ADMIN — SIMVASTATIN 20 MILLIGRAM(S): 20 TABLET, FILM COATED ORAL at 21:53

## 2019-06-28 RX ADMIN — APIXABAN 5 MILLIGRAM(S): 2.5 TABLET, FILM COATED ORAL at 17:43

## 2019-06-28 RX ADMIN — APIXABAN 5 MILLIGRAM(S): 2.5 TABLET, FILM COATED ORAL at 05:22

## 2019-06-28 RX ADMIN — PANTOPRAZOLE SODIUM 40 MILLIGRAM(S): 20 TABLET, DELAYED RELEASE ORAL at 05:23

## 2019-06-28 RX ADMIN — Medication 975 MILLIGRAM(S): at 00:16

## 2019-06-28 RX ADMIN — LIDOCAINE 1 PATCH: 4 CREAM TOPICAL at 19:00

## 2019-06-28 RX ADMIN — AMLODIPINE BESYLATE 5 MILLIGRAM(S): 2.5 TABLET ORAL at 05:22

## 2019-06-28 RX ADMIN — Medication 975 MILLIGRAM(S): at 14:35

## 2019-06-28 RX ADMIN — Medication 100 MILLIGRAM(S): at 21:53

## 2019-06-28 RX ADMIN — LOSARTAN POTASSIUM 100 MILLIGRAM(S): 100 TABLET, FILM COATED ORAL at 05:22

## 2019-06-28 RX ADMIN — Medication 5 MILLIGRAM(S): at 12:49

## 2019-06-28 RX ADMIN — LIDOCAINE 1 PATCH: 4 CREAM TOPICAL at 12:49

## 2019-06-28 RX ADMIN — Medication 325 MILLIGRAM(S): at 12:48

## 2019-06-28 RX ADMIN — SENNA PLUS 2 TABLET(S): 8.6 TABLET ORAL at 21:53

## 2019-06-28 RX ADMIN — Medication 975 MILLIGRAM(S): at 21:53

## 2019-06-28 NOTE — PROGRESS NOTE ADULT - ASSESSMENT
89F with right carotid stenosis p/w stroke.    - Unclear why patient has LUE weakness and cannot move RLE on my exam (RLE may be due to former polio)  - While patient can have operation based on patient is unsure if she wants an operation. She will speak to her grandson who is in medicine and make a decision.  - Should the patient/family wish to proceed with CEA, please document medical clearance and reconsult vascular surgery

## 2019-06-28 NOTE — PROGRESS NOTE ADULT - ASSESSMENT
89F with right carotid stenosis p/w stroke.    - Unclear why patient has LUE weakness and cannot move RLE on my exam (RLE may be due to former polio)  - While patient can have operation based on patient is unsure if she wants an operation. She will speak to her grandson who is in medicine and make a decision.  - Should the patient/family wish to proceed with CEA, please document medical clearance and reconsult vascular surgery    [id number]

## 2019-06-28 NOTE — PROGRESS NOTE ADULT - SUBJECTIVE AND OBJECTIVE BOX
Surgery Progress Note    SUBJECTIVE: Pt seen and examined at bedside. Patient comfortable and in no-apparent distress. Pt unsure if she would want procedure. Left hand weak, cannot move right leg. Voice ha changed.      Vital Signs Last 24 Hrs  T(C): 36.9 (28 Jun 2019 11:14), Max: 36.9 (28 Jun 2019 11:14)  T(F): 98.5 (28 Jun 2019 11:14), Max: 98.5 (28 Jun 2019 11:14)  HR: 72 (28 Jun 2019 11:14) (58 - 72)  BP: 147/67 (28 Jun 2019 11:14) (104/54 - 147/67)  BP(mean): --  RR: 17 (28 Jun 2019 11:14) (17 - 18)  SpO2: 97% (28 Jun 2019 11:14) (95% - 97%)    Physical Exam:  General Appearance: Appears well, NAD  Respiratory: No labored breathing  CV: Pulse regularly present  Abdomen: Soft, nontense      LABS:    06-28    140  |  106  |  39<H>  ----------------------------<  109<H>  4.6   |  27  |  1.47<H>    Ca    9.5      28 Jun 2019 05:47            INs and OUTs:    06-27-19 @ 07:01 - 06-28-19 @ 07:00  --------------------------------------------------------  IN: 900 mL / OUT: 1100 mL / NET: -200 mL    06-28-19 @ 07:01 - 06-28-19 @ 13:07  --------------------------------------------------------  IN: 480 mL / OUT: 0 mL / NET: 480 mL

## 2019-06-28 NOTE — PROGRESS NOTE ADULT - SUBJECTIVE AND OBJECTIVE BOX
Patient is a 89y old  Female who presents with a chief complaint of hip fracture (28 Jun 2019 13:06)      HPI:    Patient is a 89y old  Female who presents with a chief complaint of hip fracture.  s/p mech fall in the kitchen onto R hip when right leg gave way, now c/o severe right hip pain and inability to ambulate.  Patient denies headstrike or LOC. Localizes pain to right hip/femur/knee. Patient denies radiation of pain. Patient denies numbness/tingling/burning in the RLE. No other bone/joint complaints. Patient is a community ambulator at baseline with a rolling walker. Patient has no issues w/ ADLs/IADLs. The patient underwent a right hemiarthroplasty ORIF surgery on 6/22/2019. The patient is now postop, feeling well and complaining of moderate right hip incisional pain. When last seen on the morning of 6/23/19, she is wanted to be out of bed to chair and begin ambulation. She had an uneventful day until 6:30 PM when she noted weakness of her left hand and altered ability to move her fingers.  Her daughter noticed facial weakness and slurring of words.  No staff was notified and the patient remained this way until next examined by physical therapy on the morning of 6/24/19.  She was found to have  significant left hemiparesis of the left arm greater than left leg, with facial droop and difficulty articulating.  The patient was alert and oriented x3 and she answered questions appropriately.  She had clear weakness in the left arm and left leg as compared to the day before.  EKG was obtained and she was in normal sinus rhythm,  with no ST-T wave changes. Blood pressure was low between 100- 120 systolic.  Blood pressure medications were held, unless blood pressure was greater than 130 systolic.  The patient had a neurological consultation and  right brain CVA was suspected.  The patient underwent CT of the brain at this time showed no hemorrhages or masses.  There was no hydrocephalus.  Possibility of infarction was present and an MRI was advised.  After the CT scan of the brain the patient received 325 mg of aspirin.  It was now approximately 16 hours after the onset of left hemiparesis.  The patient was transferred to a monitored bed to rule out  episodic paroxysmal atrial fibrillation that could have caused her  acute CVA.  The patient had previously been on 2.5 mg Eliquis twice a day and dosage wasnow increased to 5 mg twice a day for therapeutic anticoagulation.  Her rhythm has remained  NSR with a pulse of 64.  The patient has no pain and with the exception of left-sided weakness, has no changes in her condition.  She complains bitterly of right leg postoperative pain  The patient has no new medical complaints at this time. 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  The patient and daughter were fully  informed of right carotid critical stenosis. Telemetry continues with NSR  and no arrhythmias    Spoke with Dr Acosta [facility] about the patient's critical carotid stenosis on Doppler based on velocities. MRA of brain confirm the lesion at the bifurcation and antegrade flow. Seen by Dr Carnes, Vascular surgery who advises surgical intervention. Full information provided by me to patient, daughter and physician grandson who will consider and advise. Risk of addition brain events explained and are likely if there is no surgical intervention.  Reviewed that above again  with son and patient .    MEDICATIONS  (STANDING):  acetaminophen   Tablet .. 975 milliGRAM(s) Oral every 8 hours  amLODIPine   Tablet 5 milliGRAM(s) Oral daily  apixaban 5 milliGRAM(s) Oral two times a day  aspirin 325 milliGRAM(s) Oral daily  docusate sodium 100 milliGRAM(s) Oral three times a day  lidocaine   Patch 1 Patch Transdermal every 24 hours  losartan 100 milliGRAM(s) Oral daily  oxybutynin 5 milliGRAM(s) Oral daily  pantoprazole    Tablet 40 milliGRAM(s) Oral before breakfast  polyethylene glycol 3350 17 Gram(s) Oral daily  senna 2 Tablet(s) Oral at bedtime  simvastatin 20 milliGRAM(s) Oral at bedtime    MEDICATIONS  (PRN):  bisacodyl Suppository 10 milliGRAM(s) Rectal daily PRN If no bowel movement  magnesium hydroxide Suspension 30 milliLiter(s) Oral daily PRN Constipation  ondansetron Injectable 4 milliGRAM(s) IV Push every 6 hours PRN Nausea and/or Vomiting  traMADol 25 milliGRAM(s) Oral every 4 hours PRN Moderate Pain (4 - 6)  traMADol 50 milliGRAM(s) Oral every 4 hours PRN Severe Pain (7 - 10)      Allergies    No Known Allergies    Intolerances        Vital Signs Last 24 Hrs  T(C): 36.9 (28 Jun 2019 11:14), Max: 36.9 (28 Jun 2019 11:14)  T(F): 98.5 (28 Jun 2019 11:14), Max: 98.5 (28 Jun 2019 11:14)  HR: 72 (28 Jun 2019 11:14) (58 - 72)  BP: 147/67 (28 Jun 2019 11:14) (104/54 - 147/67)  BP(mean): --  RR: 17 (28 Jun 2019 11:14) (17 - 18)  SpO2: 97% (28 Jun 2019 11:14) (95% - 97%)          PHYSICAL EXAM:  GENERAL: NAD, well nourished and conversant  HEAD:  Atraumatic  EYES: EOM, PERRLA, conjunctiva pink and sclera white  ENT: No tonsillar erythema, exudates, or enlargement, moist mucous membranes, good dentition, no lesions  NECK: Supple, No JVD, normal thyroid, carotids with normal upstrokes and no bruits  CHEST/LUNG: Clear to auscultation bilaterally, No rales, rhonchi, wheezing, or rubs  HEART: Regular rate and rhythm, No murmurs, rubs, or gallops  ABDOMEN: Soft, nondistended, no masses, guarding, tenderness or rebound, bowel sounds present  EXTREMITIES:  2+ Peripheral Pulses, No clubbing, cyanosis, or edema. No arthritis of shoulders, elbows, hands, hips, knees, ankles, or feet. No DJD C spine, T spine, or L/S spine  LYMPH: No lymphadenopathy noted  SKIN: No rashes or lesions  NERVOUS SYSTEM:  Alert & Oriented X3, normal cognitive function. Motor Strength 5/5 right upper and right lower.  5/5 left upper and left lower extremities, DTRs 2+ intact and symmetric    LABS:            LABS:    06-28    140  |  106  |  39<H>  ----------------------------<  109<H>  4.6   |  27  |  1.47<H>    Ca    9.5      28 Jun 2019 05:47            INs and OUTs:    06-27-19 @ 07:01  -  06-28-19 @ 07:00  --------------------------------------------------------  IN: 900 mL / OUT: 1100 mL / NET: -200 mL    06-28-19 @ 07:01  -  06-28-19 @ 13:07  --------------------------------------------------------  IN: 480 mL / OUT: 0 mL / NET: 480 mL          RADIOLOGY & ADDITIONAL TESTS:      Consultant(s):    Care Discussed with Consultants/Other Providers [ ] YES  [ ] NO

## 2019-06-28 NOTE — PROGRESS NOTE ADULT - ASSESSMENT
ASSESSMENT: This is a 89ywoman with a PMH of HTN, HLD, A-Fib (on Eliquis) and Polio (w/ residual LLE 1/5 strength) who is admitted for right femoral neck fracture after a slip and fall. Neurology consulted for new onset left facial droop, RUE weakness w/ decreased sensation. Exam initially showed Left nasolabial flattening with JACKIE 4/5 and decreased sensation on the left with upgoing left toe.  MRI brain - small acute right MCA territory infarcts. Carotid doppler showing right ICA stenosis. MRA reveals marked ICA stenosis. TTE unremarkable.    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    NEURO: Patient with R ICA presumed symptomatic stenosis. Competing mechanism would be PAF having been off AC for hip repair. Patient has since resumed oral anticoagulant.  Results of MRA indicate marked stenosis Right ICA Spoke with family and patient at length regarding risks and benefits of surgical intervention. Family to consider timing of potential intervention.

## 2019-06-28 NOTE — PROGRESS NOTE ADULT - SUBJECTIVE AND OBJECTIVE BOX
THE PATIENT WAS SEEN AND EXAMINED BY ME WITH THE HOUSESTAFF AND STROKE TEAM DURING MORNING ROUNDS.     SUBJECTIVE: No events overnight.  No new neurologic complaints.      acetaminophen   Tablet .. 975 milliGRAM(s) Oral every 8 hours  amLODIPine   Tablet 5 milliGRAM(s) Oral daily  apixaban 5 milliGRAM(s) Oral two times a day  aspirin 325 milliGRAM(s) Oral daily  bisacodyl Suppository 10 milliGRAM(s) Rectal daily PRN  docusate sodium 100 milliGRAM(s) Oral three times a day  lidocaine   Patch 1 Patch Transdermal every 24 hours  losartan 100 milliGRAM(s) Oral daily  magnesium hydroxide Suspension 30 milliLiter(s) Oral daily PRN  ondansetron Injectable 4 milliGRAM(s) IV Push every 6 hours PRN  oxybutynin 5 milliGRAM(s) Oral daily  pantoprazole    Tablet 40 milliGRAM(s) Oral before breakfast  polyethylene glycol 3350 17 Gram(s) Oral daily  senna 2 Tablet(s) Oral at bedtime  simvastatin 20 milliGRAM(s) Oral at bedtime  traMADol 25 milliGRAM(s) Oral every 4 hours PRN  traMADol 50 milliGRAM(s) Oral every 4 hours PRN      PHYSICAL EXAM:   Vital Signs Last 24 Hrs  T(C): 36.7 (27 Jun 2019 09:40), Max: 36.7 (27 Jun 2019 04:16)  T(F): 98 (27 Jun 2019 09:40), Max: 98.1 (27 Jun 2019 04:16)  HR: 72 (27 Jun 2019 09:40) (69 - 76)  BP: 118/65 (27 Jun 2019 09:40) (110/68 - 147/69)  BP(mean): --  RR: 18 (27 Jun 2019 09:40) (18 - 18)  SpO2: 98% (27 Jun 2019 09:40) (95% - 98%)        General:  Elderly female, laying in bed. No acute distress  HEENT: Normocephalic, atraumatic.  EOM intact, visual fields full  Abdomen: Soft, nontender, nondistended   Extremities: No edema    NEUROLOGICAL EXAM:  Mental status: Awake, alert, oriented x3. Speech is fluent, no aphasia.  Cranial Nerves: Nasolabial flattening on left side, No nystagmus, no dysarthria, no dysphagia. Tongue is  midline, shoulder shrug intact bilaterally. Decreased sensation to LT in the left CN V1-3  Motor exam: Normal tone, no drift, 5/5 RUE, 4/5 RLE, 1/5 LUE, 5/5 LLE.   Sensation: Decreased sensation to LT in the left UE, No extinction.   Coordination/ Gait: Weakness toe LUE on fine finger movements  Gait not assessed on bedrest.               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   There is no hemorrhagic transformation.  No abnormal parenchymal or leptomeningeal enhancement.

## 2019-06-29 LAB
ANION GAP SERPL CALC-SCNC: 11 MMOL/L — SIGNIFICANT CHANGE UP (ref 5–17)
BASOPHILS # BLD AUTO: 0.08 K/UL — SIGNIFICANT CHANGE UP (ref 0–0.2)
BASOPHILS NFR BLD AUTO: 1.3 % — SIGNIFICANT CHANGE UP (ref 0–2)
BUN SERPL-MCNC: 40 MG/DL — HIGH (ref 7–23)
CALCIUM SERPL-MCNC: 9.5 MG/DL — SIGNIFICANT CHANGE UP (ref 8.4–10.5)
CHLORIDE SERPL-SCNC: 105 MMOL/L — SIGNIFICANT CHANGE UP (ref 96–108)
CO2 SERPL-SCNC: 25 MMOL/L — SIGNIFICANT CHANGE UP (ref 22–31)
CREAT SERPL-MCNC: 1.4 MG/DL — HIGH (ref 0.5–1.3)
EOSINOPHIL # BLD AUTO: 0.56 K/UL — HIGH (ref 0–0.5)
EOSINOPHIL NFR BLD AUTO: 9.2 % — HIGH (ref 0–6)
GLUCOSE SERPL-MCNC: 105 MG/DL — HIGH (ref 70–99)
HCT VFR BLD CALC: 27.6 % — LOW (ref 34.5–45)
HGB BLD-MCNC: 8.6 G/DL — LOW (ref 11.5–15.5)
IMM GRANULOCYTES NFR BLD AUTO: 0.5 % — SIGNIFICANT CHANGE UP (ref 0–1.5)
LYMPHOCYTES # BLD AUTO: 1.81 K/UL — SIGNIFICANT CHANGE UP (ref 1–3.3)
LYMPHOCYTES # BLD AUTO: 29.9 % — SIGNIFICANT CHANGE UP (ref 13–44)
MCHC RBC-ENTMCNC: 30.1 PG — SIGNIFICANT CHANGE UP (ref 27–34)
MCHC RBC-ENTMCNC: 31.2 GM/DL — LOW (ref 32–36)
MCV RBC AUTO: 96.5 FL — SIGNIFICANT CHANGE UP (ref 80–100)
MONOCYTES # BLD AUTO: 0.67 K/UL — SIGNIFICANT CHANGE UP (ref 0–0.9)
MONOCYTES NFR BLD AUTO: 11.1 % — SIGNIFICANT CHANGE UP (ref 2–14)
NEUTROPHILS # BLD AUTO: 2.91 K/UL — SIGNIFICANT CHANGE UP (ref 1.8–7.4)
NEUTROPHILS NFR BLD AUTO: 48 % — SIGNIFICANT CHANGE UP (ref 43–77)
PLATELET # BLD AUTO: 276 K/UL — SIGNIFICANT CHANGE UP (ref 150–400)
POTASSIUM SERPL-MCNC: 4.8 MMOL/L — SIGNIFICANT CHANGE UP (ref 3.5–5.3)
POTASSIUM SERPL-SCNC: 4.8 MMOL/L — SIGNIFICANT CHANGE UP (ref 3.5–5.3)
RBC # BLD: 2.86 M/UL — LOW (ref 3.8–5.2)
RBC # FLD: 12.4 % — SIGNIFICANT CHANGE UP (ref 10.3–14.5)
SODIUM SERPL-SCNC: 141 MMOL/L — SIGNIFICANT CHANGE UP (ref 135–145)
WBC # BLD: 6.06 K/UL — SIGNIFICANT CHANGE UP (ref 3.8–10.5)
WBC # FLD AUTO: 6.06 K/UL — SIGNIFICANT CHANGE UP (ref 3.8–10.5)

## 2019-06-29 RX ADMIN — Medication 975 MILLIGRAM(S): at 22:41

## 2019-06-29 RX ADMIN — PANTOPRAZOLE SODIUM 40 MILLIGRAM(S): 20 TABLET, DELAYED RELEASE ORAL at 05:26

## 2019-06-29 RX ADMIN — Medication 100 MILLIGRAM(S): at 05:26

## 2019-06-29 RX ADMIN — LIDOCAINE 1 PATCH: 4 CREAM TOPICAL at 00:00

## 2019-06-29 RX ADMIN — LOSARTAN POTASSIUM 100 MILLIGRAM(S): 100 TABLET, FILM COATED ORAL at 05:25

## 2019-06-29 RX ADMIN — AMLODIPINE BESYLATE 5 MILLIGRAM(S): 2.5 TABLET ORAL at 05:26

## 2019-06-29 RX ADMIN — LIDOCAINE 1 PATCH: 4 CREAM TOPICAL at 12:06

## 2019-06-29 RX ADMIN — APIXABAN 5 MILLIGRAM(S): 2.5 TABLET, FILM COATED ORAL at 17:40

## 2019-06-29 RX ADMIN — Medication 5 MILLIGRAM(S): at 12:07

## 2019-06-29 RX ADMIN — APIXABAN 5 MILLIGRAM(S): 2.5 TABLET, FILM COATED ORAL at 05:26

## 2019-06-29 RX ADMIN — Medication 975 MILLIGRAM(S): at 05:30

## 2019-06-29 RX ADMIN — LIDOCAINE 1 PATCH: 4 CREAM TOPICAL at 19:52

## 2019-06-29 RX ADMIN — Medication 975 MILLIGRAM(S): at 05:26

## 2019-06-29 RX ADMIN — Medication 325 MILLIGRAM(S): at 12:05

## 2019-06-29 RX ADMIN — Medication 975 MILLIGRAM(S): at 12:24

## 2019-06-29 RX ADMIN — Medication 975 MILLIGRAM(S): at 21:39

## 2019-06-29 RX ADMIN — SIMVASTATIN 20 MILLIGRAM(S): 20 TABLET, FILM COATED ORAL at 21:39

## 2019-06-29 NOTE — PROGRESS NOTE ADULT - ASSESSMENT
89F with right carotid stenosis p/w stroke.    - Unclear why patient has LUE weakness and cannot move RLE on my exam (RLE may be due to former polio)  - While patient can have operation based on patient is unsure if she wants an operation. She will speak to her grandson who is in medicine and make a decision.  - Should the patient/family wish to proceed with CEA, please document medical clearance and reconsult vascular surgery    Still undecided re: surgery  wants to speak toher grandson Brandi courtney

## 2019-06-30 LAB
ANION GAP SERPL CALC-SCNC: 9 MMOL/L — SIGNIFICANT CHANGE UP (ref 5–17)
BLD GP AB SCN SERPL QL: NEGATIVE — SIGNIFICANT CHANGE UP
BUN SERPL-MCNC: 47 MG/DL — HIGH (ref 7–23)
CALCIUM SERPL-MCNC: 9.1 MG/DL — SIGNIFICANT CHANGE UP (ref 8.4–10.5)
CHLORIDE SERPL-SCNC: 105 MMOL/L — SIGNIFICANT CHANGE UP (ref 96–108)
CO2 SERPL-SCNC: 25 MMOL/L — SIGNIFICANT CHANGE UP (ref 22–31)
CREAT SERPL-MCNC: 1.48 MG/DL — HIGH (ref 0.5–1.3)
CULTURE RESULTS: SIGNIFICANT CHANGE UP
CULTURE RESULTS: SIGNIFICANT CHANGE UP
GLUCOSE SERPL-MCNC: 119 MG/DL — HIGH (ref 70–99)
HCT VFR BLD CALC: 23.3 % — LOW (ref 34.5–45)
HCT VFR BLD CALC: 23.6 % — LOW (ref 34.5–45)
HGB BLD-MCNC: 7.2 G/DL — LOW (ref 11.5–15.5)
HGB BLD-MCNC: 8.1 G/DL — LOW (ref 11.5–15.5)
MCHC RBC-ENTMCNC: 29.9 PG — SIGNIFICANT CHANGE UP (ref 27–34)
MCHC RBC-ENTMCNC: 30.5 GM/DL — LOW (ref 32–36)
MCHC RBC-ENTMCNC: 32.9 PG — SIGNIFICANT CHANGE UP (ref 27–34)
MCHC RBC-ENTMCNC: 34.6 GM/DL — SIGNIFICANT CHANGE UP (ref 32–36)
MCV RBC AUTO: 95 FL — SIGNIFICANT CHANGE UP (ref 80–100)
MCV RBC AUTO: 97.9 FL — SIGNIFICANT CHANGE UP (ref 80–100)
PLATELET # BLD AUTO: 266 K/UL — SIGNIFICANT CHANGE UP (ref 150–400)
PLATELET # BLD AUTO: 315 K/UL — SIGNIFICANT CHANGE UP (ref 150–400)
POTASSIUM SERPL-MCNC: 4.5 MMOL/L — SIGNIFICANT CHANGE UP (ref 3.5–5.3)
POTASSIUM SERPL-SCNC: 4.5 MMOL/L — SIGNIFICANT CHANGE UP (ref 3.5–5.3)
RBC # BLD: 2.41 M/UL — LOW (ref 3.8–5.2)
RBC # BLD: 2.45 M/UL — LOW (ref 3.8–5.2)
RBC # FLD: 11.6 % — SIGNIFICANT CHANGE UP (ref 10.3–14.5)
RBC # FLD: 12.3 % — SIGNIFICANT CHANGE UP (ref 10.3–14.5)
RH IG SCN BLD-IMP: POSITIVE — SIGNIFICANT CHANGE UP
SODIUM SERPL-SCNC: 139 MMOL/L — SIGNIFICANT CHANGE UP (ref 135–145)
SPECIMEN SOURCE: SIGNIFICANT CHANGE UP
SPECIMEN SOURCE: SIGNIFICANT CHANGE UP
WBC # BLD: 5.71 K/UL — SIGNIFICANT CHANGE UP (ref 3.8–10.5)
WBC # BLD: 6.9 K/UL — SIGNIFICANT CHANGE UP (ref 3.8–10.5)
WBC # FLD AUTO: 5.71 K/UL — SIGNIFICANT CHANGE UP (ref 3.8–10.5)
WBC # FLD AUTO: 6.9 K/UL — SIGNIFICANT CHANGE UP (ref 3.8–10.5)

## 2019-06-30 RX ADMIN — APIXABAN 5 MILLIGRAM(S): 2.5 TABLET, FILM COATED ORAL at 05:17

## 2019-06-30 RX ADMIN — SIMVASTATIN 20 MILLIGRAM(S): 20 TABLET, FILM COATED ORAL at 21:10

## 2019-06-30 RX ADMIN — TRAMADOL HYDROCHLORIDE 25 MILLIGRAM(S): 50 TABLET ORAL at 12:35

## 2019-06-30 RX ADMIN — Medication 975 MILLIGRAM(S): at 21:10

## 2019-06-30 RX ADMIN — Medication 975 MILLIGRAM(S): at 06:32

## 2019-06-30 RX ADMIN — APIXABAN 5 MILLIGRAM(S): 2.5 TABLET, FILM COATED ORAL at 17:45

## 2019-06-30 RX ADMIN — TRAMADOL HYDROCHLORIDE 25 MILLIGRAM(S): 50 TABLET ORAL at 11:35

## 2019-06-30 RX ADMIN — Medication 5 MILLIGRAM(S): at 11:35

## 2019-06-30 RX ADMIN — LIDOCAINE 1 PATCH: 4 CREAM TOPICAL at 00:10

## 2019-06-30 RX ADMIN — Medication 975 MILLIGRAM(S): at 22:17

## 2019-06-30 RX ADMIN — LIDOCAINE 1 PATCH: 4 CREAM TOPICAL at 11:34

## 2019-06-30 RX ADMIN — Medication 975 MILLIGRAM(S): at 13:27

## 2019-06-30 RX ADMIN — Medication 975 MILLIGRAM(S): at 14:30

## 2019-06-30 RX ADMIN — Medication 325 MILLIGRAM(S): at 11:35

## 2019-06-30 RX ADMIN — Medication 975 MILLIGRAM(S): at 05:17

## 2019-06-30 RX ADMIN — LIDOCAINE 1 PATCH: 4 CREAM TOPICAL at 23:44

## 2019-06-30 RX ADMIN — PANTOPRAZOLE SODIUM 40 MILLIGRAM(S): 20 TABLET, DELAYED RELEASE ORAL at 05:17

## 2019-06-30 NOTE — DIETITIAN INITIAL EVALUATION ADULT. - OTHER INFO
Pt seen at bedside, reports decreased appetite over the past 3 years. Typical day consists of cheerios, yogurt, fruit, cookies, cake, and dinner from pt's daughter. Pt reports allergy to peaches. Pt denies taking any micronutrient supplements. Pt reports poor PO intake continues inhouse, consuming <75% of meals. Obtained food preferences to optimize intake. Pt amenable to 1 health shake daily (200kcal, 6gm protein). Pt denies any history of chewing/swallowing difficulty or GI distress at this time. Last BM yesterday.

## 2019-06-30 NOTE — PROGRESS NOTE ADULT - ASSESSMENT
89F with right carotid stenosis p/w stroke.    - Unclear why patient has LUE weakness and cannot move RLE on my exam (RLE may be due to former polio)  - While patient can have operation based on patient is unsure if she wants an operation. She will speak to her grandson who is in medicine and make a decision.  - Should the patient/family wish to proceed with CEA, please document medical clearance and reconsult vascular surgery    Patient still deciding re; carotid endarterectomy

## 2019-06-30 NOTE — PROGRESS NOTE ADULT - SUBJECTIVE AND OBJECTIVE BOX
Patient is a 89y old  Female who presents with a chief complaint of hip fracture (28 Jun 2019 13:06)      HPI:    Patient is a 89y old  Female who presents with a chief complaint of hip fracture.  s/p mech fall in the kitchen onto R hip when right leg gave way, now c/o severe right hip pain and inability to ambulate.  Patient denies headstrike or LOC. Localizes pain to right hip/femur/knee. Patient denies radiation of pain. Patient denies numbness/tingling/burning in the RLE. No other bone/joint complaints. Patient is a community ambulator at baseline with a rolling walker. Patient has no issues w/ ADLs/IADLs. The patient underwent a right hemiarthroplasty ORIF surgery on 6/22/2019. The patient is now postop, feeling well and complaining of moderate right hip incisional pain. When last seen on the morning of 6/23/19, she is wanted to be out of bed to chair and begin ambulation. She had an uneventful day until 6:30 PM when she noted weakness of her left hand and altered ability to move her fingers.  Her daughter noticed facial weakness and slurring of words.  No staff was notified and the patient remained this way until next examined by physical therapy on the morning of 6/24/19.  She was found to have  significant left hemiparesis of the left arm greater than left leg, with facial droop and difficulty articulating.  The patient was alert and oriented x3 and she answered questions appropriately.  She had clear weakness in the left arm and left leg as compared to the day before.  EKG was obtained and she was in normal sinus rhythm,  with no ST-T wave changes. Blood pressure was low between 100- 120 systolic.  Blood pressure medications were held, unless blood pressure was greater than 130 systolic.  The patient had a neurological consultation and  right brain CVA was suspected.  The patient underwent CT of the brain at this time showed no hemorrhages or masses.  There was no hydrocephalus.  Possibility of infarction was present and an MRI was advised.  After the CT scan of the brain the patient received 325 mg of aspirin.  It was now approximately 16 hours after the onset of left hemiparesis.  The patient was transferred to a monitored bed to rule out  episodic paroxysmal atrial fibrillation that could have caused her  acute CVA.  The patient had previously been on 2.5 mg Eliquis twice a day and dosage wasnow increased to 5 mg twice a day for therapeutic anticoagulation.  Her rhythm has remained  NSR with a pulse of 64.  The patient has no pain and with the exception of left-sided weakness, has no changes in her condition.  She complains bitterly of right leg postoperative pain  The patient has no new medical complaints at this time. 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  The patient and daughter were fully  informed of right carotid critical stenosis. Telemetry continues with NSR  and no arrhythmias    Spoke with Dr Ac[facility] about the patient's critical carotid stenosis on Doppler based on velocities. MRA of brain confirm the lesion at the bifurcation and antegrade flow. Seen by Dr Carnes, Vascular surgery who advises surgical intervention. Full information provided by me to patient, daughter and physician grandson who will consider and advise. Risk of addition brain events explained and are likely if there is no surgical intervention.  Reviewed that above again  with son and patient .    MEDICATIONS  (STANDING):  acetaminophen   Tablet .. 975 milliGRAM(s) Oral every 8 hours  amLODIPine   Tablet 5 milliGRAM(s) Oral daily  apixaban 5 milliGRAM(s) Oral two times a day  aspirin 325 milliGRAM(s) Oral daily  docusate sodium 100 milliGRAM(s) Oral three times a day  lidocaine   Patch 1 Patch Transdermal every 24 hours  losartan 100 milliGRAM(s) Oral daily  oxybutynin 5 milliGRAM(s) Oral daily  pantoprazole    Tablet 40 milliGRAM(s) Oral before breakfast  polyethylene glycol 3350 17 Gram(s) Oral daily  senna 2 Tablet(s) Oral at bedtime  simvastatin 20 milliGRAM(s) Oral at bedtime    MEDICATIONS  (PRN):  bisacodyl Suppository 10 milliGRAM(s) Rectal daily PRN If no bowel movement  magnesium hydroxide Suspension 30 milliLiter(s) Oral daily PRN Constipation  ondansetron Injectable 4 milliGRAM(s) IV Push every 6 hours PRN Nausea and/or Vomiting  traMADol 25 milliGRAM(s) Oral every 4 hours PRN Moderate Pain (4 - 6)  traMADol 50 milliGRAM(s) Oral every 4 hours PRN Severe Pain (7 - 10)      Allergies    No Known Allergies    Intolerances        Vital Signs Last 24 Hrs stable            PHYSICAL EXAM:  GENERAL: NAD, well nourished and conversant  HEAD:  Atraumatic  EYES: EOM, PERRLA, conjunctiva pink and sclera white  ENT: No tonsillar erythema, exudates, or enlargement, moist mucous membranes, good dentition, no lesions  NECK: Supple, No JVD, normal thyroid, carotids with normal upstrokes and no bruits  CHEST/LUNG: Clear to auscultation bilaterally, No rales, rhonchi, wheezing, or rubs  HEART: Regular rate and rhythm, No murmurs, rubs, or gallops  ABDOMEN: Soft, nondistended, no masses, guarding, tenderness or rebound, bowel sounds present  EXTREMITIES:  2+ Peripheral Pulses, No clubbing, cyanosis, or edema. No arthritis of shoulders, elbows, hands, hips, knees, ankles, or feet. No DJD C spine, T spine, or L/S spine  LYMPH: No lymphadenopathy noted  SKIN: No rashes or lesions  NERVOUS SYSTEM:  Alert & Oriented X3, normal cognitive function. Motor Strength 5/5 right upper and right lower.  5/5 left upper and left lower extremities, DTRs 2+ intact and symmetric    LABS:            LABS:    06-28    140  |  106  |  39<H>  ----------------------------<  109<H>  4.6   |  27  |  1.47<H>    Ca    9.5      28 Jun 2019 05:47            INs and OUTs:    06-27-19 @ 07:01  -  06-28-19 @ 07:00  --------------------------------------------------------  IN: 900 mL / OUT: 1100 mL / NET: -200 mL    06-28-19 @ 07:01  -  06-28-19 @ 13:07  --------------------------------------------------------  IN: 480 mL / OUT: 0 mL / NET: 480 mL          RADIOLOGY & ADDITIONAL TESTS:      Consultant(s):    Care Discussed with Consultants/Other Providers [ ] YES  [ ] NO

## 2019-06-30 NOTE — PROGRESS NOTE ADULT - ASSESSMENT
89F with right carotid stenosis p/w stroke.    - Unclear why patient has LUE weakness and cannot move RLE on my exam (RLE may be due to former polio)  - While patient can have operation based on patient is unsure if she wants an operation. She will speak to her grandson who is in medicine and make a decision.  - Should the patient/family wish to proceed with CEA, please document medical clearance and reconsult vascular surgery    Discussed possible endarterectomy. Still undecided re vascular surgery.

## 2019-06-30 NOTE — PROGRESS NOTE ADULT - SUBJECTIVE AND OBJECTIVE BOX
Patient is a 89y old  Female who presents with a chief complaint of hip fracture (28 Jun 2019 13:06)      HPI:    Patient is a 89y old  Female who presents with a chief complaint of hip fracture.  s/p mech fall in the kitchen onto R hip when right leg gave way, now c/o severe right hip pain and inability to ambulate.  Patient denies headstrike or LOC. Localizes pain to right hip/femur/knee. Patient denies radiation of pain. Patient denies numbness/tingling/burning in the RLE. No other bone/joint complaints. Patient is a community ambulator at baseline with a rolling walker. Patient has no issues w/ ADLs/IADLs. The patient underwent a right hemiarthroplasty ORIF surgery on 6/22/2019. The patient is now postop, feeling well and complaining of moderate right hip incisional pain. When last seen on the morning of 6/23/19, she is wanted to be out of bed to chair and begin ambulation. She had an uneventful day until 6:30 PM when she noted weakness of her left hand and altered ability to move her fingers.  Her daughter noticed facial weakness and slurring of words.  No staff was notified and the patient remained this way until next examined by physical therapy on the morning of 6/24/19.  She was found to have  significant left hemiparesis of the left arm greater than left leg, with facial droop and difficulty articulating.  The patient was alert and oriented x3 and she answered questions appropriately.  She had clear weakness in the left arm and left leg as compared to the day before.  EKG was obtained and she was in normal sinus rhythm,  with no ST-T wave changes. Blood pressure was low between 100- 120 systolic.  Blood pressure medications were held, unless blood pressure was greater than 130 systolic.  The patient had a neurological consultation and  right brain CVA was suspected.  The patient underwent CT of the brain at this time showed no hemorrhages or masses.  There was no hydrocephalus.  Possibility of infarction was present and an MRI was advised.  After the CT scan of the brain the patient received 325 mg of aspirin.  It was now approximately 16 hours after the onset of left hemiparesis.  The patient was transferred to a monitored bed to rule out  episodic paroxysmal atrial fibrillation that could have caused her  acute CVA.  The patient had previously been on 2.5 mg Eliquis twice a day and dosage wasnow increased to 5 mg twice a day for therapeutic anticoagulation.  Her rhythm has remained  NSR with a pulse of 64.  The patient has no pain and with the exception of left-sided weakness, has no changes in her condition.  She complains bitterly of right leg postoperative pain  The patient has no new medical complaints at this time. 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  The patient and daughter were fully  informed of right carotid critical stenosis. Telemetry continues with NSR  and no arrhythmias    Spoke with Dr Acosta [facility] about the patient's critical carotid stenosis on Doppler based on velocities. MRA of brain confirm the lesion at the bifurcation and antegrade flow. Seen by Dr Carnes, Vascular surgery who advises surgical intervention. Full information provided by me to patient, daughter and physician grandson who will consider and advise. Risk of addition brain events explained and are likely if there is no surgical intervention.  Reviewed that above again  with son and patient .   wants more time to make decision     MEDICATIONS  (STANDING):  acetaminophen   Tablet .. 975 milliGRAM(s) Oral every 8 hours  amLODIPine   Tablet 5 milliGRAM(s) Oral daily  apixaban 5 milliGRAM(s) Oral two times a day  aspirin 325 milliGRAM(s) Oral daily  docusate sodium 100 milliGRAM(s) Oral three times a day  lidocaine   Patch 1 Patch Transdermal every 24 hours  losartan 100 milliGRAM(s) Oral daily  oxybutynin 5 milliGRAM(s) Oral daily  pantoprazole    Tablet 40 milliGRAM(s) Oral before breakfast  polyethylene glycol 3350 17 Gram(s) Oral daily  senna 2 Tablet(s) Oral at bedtime  simvastatin 20 milliGRAM(s) Oral at bedtime    MEDICATIONS  (PRN):  bisacodyl Suppository 10 milliGRAM(s) Rectal daily PRN If no bowel movement  magnesium hydroxide Suspension 30 milliLiter(s) Oral daily PRN Constipation  ondansetron Injectable 4 milliGRAM(s) IV Push every 6 hours PRN Nausea and/or Vomiting  traMADol 25 milliGRAM(s) Oral every 4 hours PRN Moderate Pain (4 - 6)  traMADol 50 milliGRAM(s) Oral every 4 hours PRN Severe Pain (7 - 10)      Allergies    No Known Allergies    Intolerances        Vital signs stable      PHYSICAL EXAM:  GENERAL: NAD, well nourished and conversant  HEAD:  Atraumatic  EYES: EOM, PERRLA, conjunctiva pink and sclera white  ENT: No tonsillar erythema, exudates, or enlargement, moist mucous membranes, good dentition, no lesions  NECK: Supple, No JVD, normal thyroid, carotids with normal upstrokes and no bruits  CHEST/LUNG: Clear to auscultation bilaterally, No rales, rhonchi, wheezing, or rubs  HEART: Regular rate and rhythm, No murmurs, rubs, or gallops  ABDOMEN: Soft, nondistended, no masses, guarding, tenderness or rebound, bowel sounds present  EXTREMITIES:  2+ Peripheral Pulses, No clubbing, cyanosis, or edema. No arthritis of shoulders, elbows, hands, hips, knees, ankles, or feet. No DJD C spine, T spine, or L/S spine  LYMPH: No lymphadenopathy noted  SKIN: No rashes or lesions  NERVOUS SYSTEM:  Alert & Oriented X3, normal cognitive function. Motor Strength 5/5 right upper and right lower.  5/5 left upper and left lower extremities, DTRs 2+ intact and symmetric    LABS:            LABS:        H/H 8.3/27.8    BUN/Cr 40/1.40                RADIOLOGY & ADDITIONAL TESTS:      Consultant(s):    Care Discussed with Consultants/Other Providers [ ] YES  [ ] NO

## 2019-06-30 NOTE — DIETITIAN INITIAL EVALUATION ADULT. - ENERGY NEEDS
Ht: 65 Wt: 183 pounds BMI: 30.5 kg/m2 IBW:125  pounds(+/-10%)   No edema. No pressure ulcers documented.

## 2019-07-01 DIAGNOSIS — I63.9 CEREBRAL INFARCTION, UNSPECIFIED: ICD-10-CM

## 2019-07-01 DIAGNOSIS — S72.009A FRACTURE OF UNSPECIFIED PART OF NECK OF UNSPECIFIED FEMUR, INITIAL ENCOUNTER FOR CLOSED FRACTURE: ICD-10-CM

## 2019-07-01 DIAGNOSIS — I10 ESSENTIAL (PRIMARY) HYPERTENSION: ICD-10-CM

## 2019-07-01 DIAGNOSIS — I65.29 OCCLUSION AND STENOSIS OF UNSPECIFIED CAROTID ARTERY: ICD-10-CM

## 2019-07-01 LAB
ANION GAP SERPL CALC-SCNC: 10 MMOL/L — SIGNIFICANT CHANGE UP (ref 5–17)
BUN SERPL-MCNC: 52 MG/DL — HIGH (ref 7–23)
CALCIUM SERPL-MCNC: 9.6 MG/DL — SIGNIFICANT CHANGE UP (ref 8.4–10.5)
CHLORIDE SERPL-SCNC: 104 MMOL/L — SIGNIFICANT CHANGE UP (ref 96–108)
CO2 SERPL-SCNC: 24 MMOL/L — SIGNIFICANT CHANGE UP (ref 22–31)
CREAT SERPL-MCNC: 1.4 MG/DL — HIGH (ref 0.5–1.3)
GLUCOSE SERPL-MCNC: 115 MG/DL — HIGH (ref 70–99)
HCT VFR BLD CALC: 25.2 % — LOW (ref 34.5–45)
HGB BLD-MCNC: 7.5 G/DL — LOW (ref 11.5–15.5)
MCHC RBC-ENTMCNC: 29.6 PG — SIGNIFICANT CHANGE UP (ref 27–34)
MCHC RBC-ENTMCNC: 29.8 GM/DL — LOW (ref 32–36)
MCV RBC AUTO: 99.6 FL — SIGNIFICANT CHANGE UP (ref 80–100)
PLATELET # BLD AUTO: 290 K/UL — SIGNIFICANT CHANGE UP (ref 150–400)
POTASSIUM SERPL-MCNC: 4.6 MMOL/L — SIGNIFICANT CHANGE UP (ref 3.5–5.3)
POTASSIUM SERPL-SCNC: 4.6 MMOL/L — SIGNIFICANT CHANGE UP (ref 3.5–5.3)
RBC # BLD: 2.53 M/UL — LOW (ref 3.8–5.2)
RBC # FLD: 12.4 % — SIGNIFICANT CHANGE UP (ref 10.3–14.5)
SODIUM SERPL-SCNC: 138 MMOL/L — SIGNIFICANT CHANGE UP (ref 135–145)
WBC # BLD: 7 K/UL — SIGNIFICANT CHANGE UP (ref 3.8–10.5)
WBC # FLD AUTO: 7 K/UL — SIGNIFICANT CHANGE UP (ref 3.8–10.5)

## 2019-07-01 RX ADMIN — Medication 5 MILLIGRAM(S): at 12:34

## 2019-07-01 RX ADMIN — Medication 975 MILLIGRAM(S): at 22:24

## 2019-07-01 RX ADMIN — APIXABAN 5 MILLIGRAM(S): 2.5 TABLET, FILM COATED ORAL at 17:52

## 2019-07-01 RX ADMIN — LIDOCAINE 1 PATCH: 4 CREAM TOPICAL at 13:52

## 2019-07-01 RX ADMIN — SIMVASTATIN 20 MILLIGRAM(S): 20 TABLET, FILM COATED ORAL at 22:24

## 2019-07-01 RX ADMIN — Medication 975 MILLIGRAM(S): at 13:53

## 2019-07-01 RX ADMIN — Medication 325 MILLIGRAM(S): at 12:33

## 2019-07-01 RX ADMIN — Medication 975 MILLIGRAM(S): at 06:14

## 2019-07-01 RX ADMIN — Medication 975 MILLIGRAM(S): at 14:53

## 2019-07-01 RX ADMIN — APIXABAN 5 MILLIGRAM(S): 2.5 TABLET, FILM COATED ORAL at 05:16

## 2019-07-01 RX ADMIN — Medication 975 MILLIGRAM(S): at 05:14

## 2019-07-01 RX ADMIN — PANTOPRAZOLE SODIUM 40 MILLIGRAM(S): 20 TABLET, DELAYED RELEASE ORAL at 05:31

## 2019-07-01 NOTE — PROGRESS NOTE ADULT - ASSESSMENT
89F hx of Open reduction and internal fixation of hip. Patient s/p cerebrovascular accident due to carotid stenosis. Patient agreeable to CEA

## 2019-07-01 NOTE — PROGRESS NOTE ADULT - SUBJECTIVE AND OBJECTIVE BOX
Patient is a 89y old  Female who presents with a chief complaint of hip fracture.  s/p mech fall in the kitchen onto R hip when right leg gave way, now c/o severe right hip pain and inability to ambulate.  Patient denies headstrike or LOC. Localizes pain to right hip/femur/knee. Patient denies radiation of pain. Patient denies numbness/tingling/burning in the RLE. No other bone/joint complaints. Patient is a community ambulator at baseline with a rolling walker. Patient has no issues w/ ADLs/IADLs. The patient underwent a right hemiarthroplasty ORIF surgery on 6/22/2019. The patient is now postop, feeling well and complaining of moderate right hip incisional pain. When last seen on the morning of 6/23/19, she is wanted to be out of bed to chair and begin ambulation. She had an uneventful day until 6:30 PM when she noted weakness of her left hand and altered ability to move her fingers.  Her daughter noticed facial weakness and slurring of words.  No staff was notified and the patient remained this way until next examined by physical therapy on the morning of 6/24/19.  She was found to have  significant left hemiparesis of the left arm greater than left leg, with facial droop and difficulty articulating.  The patient was alert and oriented x3 and she answered questions appropriately.  She had clear weakness in the left arm and left leg as compared to the day before.  EKG was obtained and she was in normal sinus rhythm,  with no ST-T wave changes. Blood pressure was low between 100- 120 systolic.  Blood pressure medications were held, unless blood pressure was greater than 130 systolic.  The patient had a neurological consultation and  right brain CVA was suspected.  The patient underwent CT of the brain at this time showed no hemorrhages or masses.  There was no hydrocephalus.  Possibility of infarction was present and an MRI was advised.  After the CT scan of the brain the patient received 325 mg of aspirin.  It was now approximately 16 hours after the onset of left hemiparesis.  The patient was transferred to a monitored bed to rule out  episodic paroxysmal atrial fibrillation that could have caused her  acute CVA.  The patient had previously been on 2.5 mg Eliquis twice a day and dosage wasnow increased to 5 mg twice a day for therapeutic anticoagulation.  Her rhythm has remained  NSR with a pulse of 64.  The patient has no pain and with the exception of left-sided weakness, has no changes in her condition.  She complains bitterly of right leg postoperative pain  The patient has no new medical complaints at this time. 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  The patient and daughter were fully  informed of right carotid critical stenosis. Telemetry continues with NSR  and no arrhythmias. Patient seen resting comfortably. speech is minimally slurred and Patient is moving all extremities      MEDICATIONS  (STANDING):  acetaminophen   Tablet .. 975 milliGRAM(s) Oral every 8 hours  amLODIPine   Tablet 5 milliGRAM(s) Oral daily  apixaban 5 milliGRAM(s) Oral two times a day  aspirin 325 milliGRAM(s) Oral daily  docusate sodium 100 milliGRAM(s) Oral three times a day  lidocaine   Patch 1 Patch Transdermal every 24 hours  losartan 100 milliGRAM(s) Oral daily  oxybutynin 5 milliGRAM(s) Oral daily  pantoprazole    Tablet 40 milliGRAM(s) Oral before breakfast  polyethylene glycol 3350 17 Gram(s) Oral daily  senna 2 Tablet(s) Oral at bedtime  simvastatin 20 milliGRAM(s) Oral at bedtime    MEDICATIONS  (PRN):  bisacodyl Suppository 10 milliGRAM(s) Rectal daily PRN If no bowel movement  magnesium hydroxide Suspension 30 milliLiter(s) Oral daily PRN Constipation  ondansetron Injectable 4 milliGRAM(s) IV Push every 6 hours PRN Nausea and/or Vomiting          VITALS:   T(C): 36.7 (07-01-19 @ 21:11), Max: 36.7 (07-01-19 @ 11:18)  HR: 74 (07-01-19 @ 21:11) (66 - 74)  BP: 114/58 (07-01-19 @ 21:11) (104/54 - 114/58)  RR: 18 (07-01-19 @ 21:11) (17 - 18)  SpO2: 98% (07-01-19 @ 21:11) (97% - 98%)  Wt(kg): --    PHYSICAL EXAM:  GENERAL: NAD, well nourished and conversant  HEAD:  Atraumatic  EYES: EOM, PERRLA, conjunctiva pink and sclera white  ENT: No tonsillar erythema, exudates, or enlargement, moist mucous membranes, good dentition, no lesions  NECK: Supple, No JVD, normal thyroid, carotids with normal upstrokes and no bruits  CHEST/LUNG: Clear to auscultation bilaterally, No rales, rhonchi, wheezing, or rubs  HEART: Regular rate and rhythm, No murmurs, rubs, or gallops  ABDOMEN: Soft, nondistended, no masses, guarding, tenderness or rebound, bowel sounds present  EXTREMITIES:  2+ Peripheral Pulses, No clubbing, cyanosis, or edema. No arthritis of shoulders, elbows, hands, hips, knees, ankles, or feet. No DJD C spine, T spine, or L/S spine  LYMPH: No lymphadenopathy noted  SKIN: No rashes or lesions  NERVOUS SYSTEM:  Alert & Oriented X3, normal cognitive function. Motor Strength 5/5 right upper and right lower.  5/5 left upper and left lower extremities, DTRs 2+ intact and symmetric    LABS:        CBC Full  -  ( 01 Jul 2019 08:15 )  WBC Count : 7.00 K/uL  RBC Count : 2.53 M/uL  Hemoglobin : 7.5 g/dL  Hematocrit : 25.2 %  Platelet Count - Automated : 290 K/uL  Mean Cell Volume : 99.6 fl  Mean Cell Hemoglobin : 29.6 pg  Mean Cell Hemoglobin Concentration : 29.8 gm/dL  Auto Neutrophil # : x  Auto Lymphocyte # : x  Auto Monocyte # : x  Auto Eosinophil # : x  Auto Basophil # : x  Auto Neutrophil % : x  Auto Lymphocyte % : x  Auto Monocyte % : x  Auto Eosinophil % : x  Auto Basophil % : x    07-01    138  |  104  |  52<H>  ----------------------------<  115<H>  4.6   |  24  |  1.40<H>    Ca    9.6      01 Jul 2019 06:07            CAPILLARY BLOOD GLUCOSE          RADIOLOGY & ADDITIONAL TESTS:

## 2019-07-01 NOTE — PROGRESS NOTE ADULT - SUBJECTIVE AND OBJECTIVE BOX
Patient is  seen  Patient  wants  to have the  Right CEA  In my opinion patient  should  wait  for  another  couple of  weeks  to see  if  shew  attains  some  independence  at Rehab  and  be  scheduled  for this  electively I spoke  to  her  grandson  and Dr Lozoya  They agree with this plan  She  also  needs to stop  the Eliquis  for the  procedure

## 2019-07-01 NOTE — PROGRESS NOTE ADULT - SUBJECTIVE AND OBJECTIVE BOX
THE PATIENT WAS SEEN AND EXAMINED BY ME WITH THE HOUSESTAFF AND STROKE TEAM DURING MORNING ROUNDS.     SUBJECTIVE: No events overnight.  No new neurologic complaints.      acetaminophen   Tablet .. 975 milliGRAM(s) Oral every 8 hours  amLODIPine   Tablet 5 milliGRAM(s) Oral daily  apixaban 5 milliGRAM(s) Oral two times a day  aspirin 325 milliGRAM(s) Oral daily  bisacodyl Suppository 10 milliGRAM(s) Rectal daily PRN  docusate sodium 100 milliGRAM(s) Oral three times a day  lidocaine   Patch 1 Patch Transdermal every 24 hours  losartan 100 milliGRAM(s) Oral daily  magnesium hydroxide Suspension 30 milliLiter(s) Oral daily PRN  ondansetron Injectable 4 milliGRAM(s) IV Push every 6 hours PRN  oxybutynin 5 milliGRAM(s) Oral daily  pantoprazole    Tablet 40 milliGRAM(s) Oral before breakfast  polyethylene glycol 3350 17 Gram(s) Oral daily  senna 2 Tablet(s) Oral at bedtime  simvastatin 20 milliGRAM(s) Oral at bedtime  traMADol 25 milliGRAM(s) Oral every 4 hours PRN  traMADol 50 milliGRAM(s) Oral every 4 hours PRN      PHYSICAL EXAM:   Vital Signs Last 24 Hrs  T(C): 36.7 (27 Jun 2019 09:40), Max: 36.7 (27 Jun 2019 04:16)  T(F): 98 (27 Jun 2019 09:40), Max: 98.1 (27 Jun 2019 04:16)  HR: 72 (27 Jun 2019 09:40) (69 - 76)  BP: 118/65 (27 Jun 2019 09:40) (110/68 - 147/69)  BP(mean): --  RR: 18 (27 Jun 2019 09:40) (18 - 18)  SpO2: 98% (27 Jun 2019 09:40) (95% - 98%)        General:  Elderly female, laying in bed. No acute distress  HEENT: Normocephalic, atraumatic.  EOM intact, visual fields full  Abdomen: Soft, nontender, nondistended   Extremities: No edema    NEUROLOGICAL EXAM:  Mental status: Awake, alert, Speech is fluent, no aphasia.  Cranial Nerves: Nasolabial flattening on left side, No nystagmus, no dysarthria, no dysphagia. Tongue is  midline, shoulder shrug intact bilaterally.   Motor exam:  5/5 RUE, 4/5 RLE, 1/5 LUE, 5/5 LLE.   Sensation: Decreased sensation to LT in the left UE, No extinction.   Coordination/ Gait: Weakness toe LUE on fine finger movements  Gait not assessed on bedrest.               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   There is no hemorrhagic transformation.  No abnormal parenchymal or leptomeningeal enhancement.

## 2019-07-01 NOTE — PROGRESS NOTE ADULT - SUBJECTIVE AND OBJECTIVE BOX
SURGERY DAILY PROGRESS NOTE:       SUBJECTIVE/ROS:     Pt states she is feeling well. Pain well controlled. States that she is  amenable to CEA.       MEDICATIONS  (STANDING):  acetaminophen   Tablet .. 975 milliGRAM(s) Oral every 8 hours  amLODIPine   Tablet 5 milliGRAM(s) Oral daily  apixaban 5 milliGRAM(s) Oral two times a day  aspirin 325 milliGRAM(s) Oral daily  docusate sodium 100 milliGRAM(s) Oral three times a day  lidocaine   Patch 1 Patch Transdermal every 24 hours  losartan 100 milliGRAM(s) Oral daily  oxybutynin 5 milliGRAM(s) Oral daily  pantoprazole    Tablet 40 milliGRAM(s) Oral before breakfast  polyethylene glycol 3350 17 Gram(s) Oral daily  senna 2 Tablet(s) Oral at bedtime  simvastatin 20 milliGRAM(s) Oral at bedtime    MEDICATIONS  (PRN):  bisacodyl Suppository 10 milliGRAM(s) Rectal daily PRN If no bowel movement  magnesium hydroxide Suspension 30 milliLiter(s) Oral daily PRN Constipation  ondansetron Injectable 4 milliGRAM(s) IV Push every 6 hours PRN Nausea and/or Vomiting      OBJECTIVE:    Vital Signs Last 24 Hrs  T(C): 36.7 (2019 11:18), Max: 36.7 (2019 20:38)  T(F): 98.1 (2019 11:18), Max: 98.1 (2019 11:18)  HR: 66 (2019 11:18) (66 - 72)  BP: 104/54 (2019 11:18) (104/54 - 125/69)  BP(mean): --  RR: 17 (2019 11:18) (17 - 18)  SpO2: 98% (2019 11:18) (96% - 98%)      I&O's Detail    2019 07:01  -  2019 07:00  --------------------------------------------------------  IN:    Oral Fluid: 1320 mL  Total IN: 1320 mL    OUT:    Incontinent per Collection Ba mL    Voided: 1000 mL  Total OUT: 1001 mL    Total NET: 319 mL      2019 07:  -  2019 17:18  --------------------------------------------------------  IN:    Oral Fluid: 600 mL  Total IN: 600 mL    OUT:  Total OUT: 0 mL    Total NET: 600 mL        Daily     Daily Weight in k.5 (2019 07:48)    LABS:                        7.5    7.00  )-----------( 290      ( 2019 08:15 )             25.2     07    138  |  104  |  52<H>  ----------------------------<  115<H>  4.6   |  24  |  1.40<H>    Ca    9.6      2019 06:07        Assessment and Plan:     89F with right carotid stenosis p/w stroke.    - Pt states she would like to have the operation. Pt and family would like to speak to provider regarding the procedure.   - Should the patient/family wish to proceed with CEA, please document medical clearance and reconsult vascular surgery

## 2019-07-02 LAB
ANION GAP SERPL CALC-SCNC: 10 MMOL/L — SIGNIFICANT CHANGE UP (ref 5–17)
BUN SERPL-MCNC: 55 MG/DL — HIGH (ref 7–23)
CALCIUM SERPL-MCNC: 9.4 MG/DL — SIGNIFICANT CHANGE UP (ref 8.4–10.5)
CHLORIDE SERPL-SCNC: 106 MMOL/L — SIGNIFICANT CHANGE UP (ref 96–108)
CO2 SERPL-SCNC: 25 MMOL/L — SIGNIFICANT CHANGE UP (ref 22–31)
CREAT SERPL-MCNC: 1.48 MG/DL — HIGH (ref 0.5–1.3)
GLUCOSE SERPL-MCNC: 108 MG/DL — HIGH (ref 70–99)
HCT VFR BLD CALC: 21 % — CRITICAL LOW (ref 34.5–45)
HGB BLD-MCNC: 6.5 G/DL — CRITICAL LOW (ref 11.5–15.5)
MCHC RBC-ENTMCNC: 30 PG — SIGNIFICANT CHANGE UP (ref 27–34)
MCHC RBC-ENTMCNC: 31 GM/DL — LOW (ref 32–36)
MCV RBC AUTO: 96.8 FL — SIGNIFICANT CHANGE UP (ref 80–100)
PLATELET # BLD AUTO: 314 K/UL — SIGNIFICANT CHANGE UP (ref 150–400)
POTASSIUM SERPL-MCNC: 4.7 MMOL/L — SIGNIFICANT CHANGE UP (ref 3.5–5.3)
POTASSIUM SERPL-SCNC: 4.7 MMOL/L — SIGNIFICANT CHANGE UP (ref 3.5–5.3)
RBC # BLD: 2.17 M/UL — LOW (ref 3.8–5.2)
RBC # FLD: 12.5 % — SIGNIFICANT CHANGE UP (ref 10.3–14.5)
SODIUM SERPL-SCNC: 141 MMOL/L — SIGNIFICANT CHANGE UP (ref 135–145)
WBC # BLD: 7.31 K/UL — SIGNIFICANT CHANGE UP (ref 3.8–10.5)
WBC # FLD AUTO: 7.31 K/UL — SIGNIFICANT CHANGE UP (ref 3.8–10.5)

## 2019-07-02 RX ADMIN — APIXABAN 5 MILLIGRAM(S): 2.5 TABLET, FILM COATED ORAL at 17:02

## 2019-07-02 RX ADMIN — Medication 975 MILLIGRAM(S): at 16:00

## 2019-07-02 RX ADMIN — PANTOPRAZOLE SODIUM 40 MILLIGRAM(S): 20 TABLET, DELAYED RELEASE ORAL at 06:14

## 2019-07-02 RX ADMIN — Medication 5 MILLIGRAM(S): at 11:14

## 2019-07-02 RX ADMIN — Medication 975 MILLIGRAM(S): at 22:15

## 2019-07-02 RX ADMIN — LIDOCAINE 1 PATCH: 4 CREAM TOPICAL at 11:14

## 2019-07-02 RX ADMIN — Medication 975 MILLIGRAM(S): at 05:20

## 2019-07-02 RX ADMIN — Medication 975 MILLIGRAM(S): at 15:00

## 2019-07-02 RX ADMIN — Medication 975 MILLIGRAM(S): at 06:14

## 2019-07-02 RX ADMIN — SIMVASTATIN 20 MILLIGRAM(S): 20 TABLET, FILM COATED ORAL at 22:16

## 2019-07-02 RX ADMIN — Medication 100 MILLIGRAM(S): at 22:15

## 2019-07-02 RX ADMIN — SENNA PLUS 2 TABLET(S): 8.6 TABLET ORAL at 22:16

## 2019-07-02 RX ADMIN — APIXABAN 5 MILLIGRAM(S): 2.5 TABLET, FILM COATED ORAL at 05:20

## 2019-07-02 RX ADMIN — Medication 325 MILLIGRAM(S): at 11:14

## 2019-07-02 NOTE — CHART NOTE - NSCHARTNOTEFT_GEN_A_CORE
Pt Hgb this AM: 6.5. Pt denies any signs/symptoms of active bleeding.   Stool occult sent   Will transfuse 1U PRBC.   Reassess CBC in AM.     Awaiting call back from Dr. Dg Cano PA-C   Dept of Medicine   Spectra: [phone] Pt Hgb this AM: 6.5. Pt denies any signs/symptoms of active bleeding.   Stool occult sent   Will transfuse 1U PRBC.   Reassess CBC in AM.   If <8.0, consider additional unit PRBC    Discussed with Dr. Conte.     Nena Cano PA-C   Dept of Medicine   Spectra: [phone]

## 2019-07-02 NOTE — PROGRESS NOTE ADULT - ASSESSMENT
89F hx of Open reduction and internal fixation of hip. Patient s/p cerebrovascular accident due to carotid stenosis. Patient agreeable to CEA  Ad per vascular he favors patient improve her overall conditioning with rehab and have the carotid surgery done in a few weeks    Anemi - Hgn < 7.  Transfuse 1 unit PRBC  guaiac stools and repeat labs in  am.

## 2019-07-02 NOTE — PROGRESS NOTE ADULT - SUBJECTIVE AND OBJECTIVE BOX
Patient is a 89y old  Female who presents with a chief complaint of hip fracture.  s/p mech fall in the kitchen onto R hip when right leg gave way, now c/o severe right hip pain and inability to ambulate.  Patient denies headstrike or LOC. Localizes pain to right hip/femur/knee. Patient denies radiation of pain. Patient denies numbness/tingling/burning in the RLE. No other bone/joint complaints. Patient is a community ambulator at baseline with a rolling walker. Patient has no issues w/ ADLs/IADLs. The patient underwent a right hemiarthroplasty ORIF surgery on 6/22/2019. The patient is now postop, feeling well and complaining of moderate right hip incisional pain. When last seen on the morning of 6/23/19, she is wanted to be out of bed to chair and begin ambulation. She had an uneventful day until 6:30 PM when she noted weakness of her left hand and altered ability to move her fingers.  Her daughter noticed facial weakness and slurring of words.  No staff was notified and the patient remained this way until next examined by physical therapy on the morning of 6/24/19.  She was found to have  significant left hemiparesis of the left arm greater than left leg, with facial droop and difficulty articulating.  The patient was alert and oriented x3 and she answered questions appropriately.  She had clear weakness in the left arm and left leg as compared to the day before.  EKG was obtained and she was in normal sinus rhythm,  with no ST-T wave changes. Blood pressure was low between 100- 120 systolic.  Blood pressure medications were held, unless blood pressure was greater than 130 systolic.  The patient had a neurological consultation and  right brain CVA was suspected.  The patient underwent CT of the brain at this time showed no hemorrhages or masses.  There was no hydrocephalus.  Possibility of infarction was present and an MRI was advised.  After the CT scan of the brain the patient received 325 mg of aspirin.  It was now approximately 16 hours after the onset of left hemiparesis.  The patient was transferred to a monitored bed to rule out  episodic paroxysmal atrial fibrillation that could have caused her  acute CVA.  The patient had previously been on 2.5 mg Eliquis twice a day and dosage wasnow increased to 5 mg twice a day for therapeutic anticoagulation.  Her rhythm has remained  NSR with a pulse of 64.  The patient has no pain and with the exception of left-sided weakness, has no changes in her condition.  She complains bitterly of right leg postoperative pain  The patient has no new medical complaints at this time. 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  The patient and daughter were fully  informed of right carotid critical stenosis. Telemetry continues with NSR  and no arrhythmias. Patient seen resting comfortably. speech is minimally slurred and Patient is moving all extremities  Patien and vascular have decided to do rehab before undergoing carotid endarterectomy . Needs  D/C planning for rehab .      MEDICATIONS  (STANDING):  acetaminophen   Tablet .. 975 milliGRAM(s) Oral every 8 hours  amLODIPine   Tablet 5 milliGRAM(s) Oral daily  apixaban 5 milliGRAM(s) Oral two times a day  aspirin 325 milliGRAM(s) Oral daily  docusate sodium 100 milliGRAM(s) Oral three times a day  lidocaine   Patch 1 Patch Transdermal every 24 hours  losartan 100 milliGRAM(s) Oral daily  oxybutynin 5 milliGRAM(s) Oral daily  pantoprazole    Tablet 40 milliGRAM(s) Oral before breakfast  polyethylene glycol 3350 17 Gram(s) Oral daily  senna 2 Tablet(s) Oral at bedtime  simvastatin 20 milliGRAM(s) Oral at bedtime    MEDICATIONS  (PRN):  bisacodyl Suppository 10 milliGRAM(s) Rectal daily PRN If no bowel movement  magnesium hydroxide Suspension 30 milliLiter(s) Oral daily PRN Constipation  ondansetron Injectable 4 milliGRAM(s) IV Push every 6 hours PRN Nausea and/or Vomiting    Vital Signs Last 24 Hrs  T(C): 36.5 ), Max: 36.9 (02 Jul 2019 21:21)  T(F): 97.7  Max: 98.4 (02 Jul 2019 21:21)  HR: 70  (70 - 78)  BP: 138/64  (108/60 - 138/64)  SpO2: 96%  (95% - 96%)    PHYSICAL EXAM:  GENERAL: NAD, well nourished and conversant  HEAD:  Atraumatic  EYES: EOM, PERRLA, conjunctiva pink and sclera white  ENT: No tonsillar erythema, exudates, or enlargement, moist mucous membranes, good dentition, no lesions  NECK: Supple, No JVD, normal thyroid, carotids with normal upstrokes and no bruits  CHEST/LUNG: Clear to auscultation bilaterally, No rales, rhonchi, wheezing, or rubs  HEART: Regular rate and rhythm, No murmurs, rubs, or gallops  ABDOMEN: Soft, nondistended, no masses, guarding, tenderness or rebound, bowel sounds present  EXTREMITIES:  2+ Peripheral Pulses, No clubbing, cyanosis, or edema. No arthritis of shoulders, elbows, hands, hips, knees, ankles, or feet. No DJD C spine, T spine, or L/S spine  LYMPH: No lymphadenopathy noted  SKIN: No rashes or lesions  NERVOUS SYSTEM:  Alert & Oriented X3, normal cognitive function. Motor Strength 5/5 right upper and right lower.  5/5 left upper and left lower extremities, DTRs 2+ intact and symmetric    LABS:          CBC Full  -  ( 02 Jul 2019 08:28 )  WBC Count : 7.31 K/uL  RBC Count : 2.17 M/uL  Hemoglobin : 6.5 g/dL  Hematocrit : 21.0 %  Platelet Count - Automated : 314 K/uL  Mean Cell Volume : 96.8 fl  Mean Cell Hemoglobin : 30.0 pg  Mean Cell Hemoglobin Concentration : 31.0 gm/dL  Auto Neutrophil # : x  Auto Lymphocyte # : x  Auto Monocyte # : x  Auto Eosinophil # : x  Auto Basophil # : x  Auto Neutrophil % : x  Auto Lymphocyte % : x  Auto Monocyte % : x  Auto Eosinophil % : x  Auto Basophil % : x      CBC Full  -  ( 01 Jul 2019 08:15 )  WBC Count : 7.00 K/uL  RBC Count : 2.53 M/uL  Hemoglobin : 7.5 g/dL  Hematocrit : 25.2 %  Platelet Count - Automated : 290 K/uL  Mean Cell Volume : 99.6 fl  Mean Cell Hemoglobin : 29.6 pg  Mean Cell Hemoglobin Concentration : 29.8 gm/dL  Auto Neutrophil # : x  Auto Lymphocyte # : x  Auto Monocyte # : x  Auto Eosinophil # : x  Auto Basophil # : x  Auto Neutrophil % : x  Auto Lymphocyte % : x  Auto Monocyte % : x  Auto Eosinophil % : x  Auto Basophil % : x    07-01    138  |  104  |  52<H>  ----------------------------<  115<H>  4.6   |  24  |  1.40<H>    Ca    9.6      01 Jul 2019 06:07            CAPILLARY BLOOD GLUCOSE          RADIOLOGY & ADDITIONAL TESTS:

## 2019-07-02 NOTE — PROGRESS NOTE ADULT - SUBJECTIVE AND OBJECTIVE BOX
I spoke  to the  patient's  grand  son David  The  plan is  to do  Right  CEA  in the  next  2 weeks  once she  is  stronger  and  gets  PT  and  ambulating

## 2019-07-03 DIAGNOSIS — D50.0 IRON DEFICIENCY ANEMIA SECONDARY TO BLOOD LOSS (CHRONIC): ICD-10-CM

## 2019-07-03 LAB
ANION GAP SERPL CALC-SCNC: 12 MMOL/L — SIGNIFICANT CHANGE UP (ref 5–17)
BUN SERPL-MCNC: 58 MG/DL — HIGH (ref 7–23)
CALCIUM SERPL-MCNC: 9.4 MG/DL — SIGNIFICANT CHANGE UP (ref 8.4–10.5)
CHLORIDE SERPL-SCNC: 107 MMOL/L — SIGNIFICANT CHANGE UP (ref 96–108)
CO2 SERPL-SCNC: 23 MMOL/L — SIGNIFICANT CHANGE UP (ref 22–31)
CREAT SERPL-MCNC: 1.58 MG/DL — HIGH (ref 0.5–1.3)
GLUCOSE SERPL-MCNC: 105 MG/DL — HIGH (ref 70–99)
HCT VFR BLD CALC: 22.6 % — LOW (ref 34.5–45)
HCT VFR BLD CALC: 26 % — LOW (ref 34.5–45)
HGB BLD-MCNC: 6.9 G/DL — CRITICAL LOW (ref 11.5–15.5)
HGB BLD-MCNC: 9.1 G/DL — LOW (ref 11.5–15.5)
MCHC RBC-ENTMCNC: 30.3 PG — SIGNIFICANT CHANGE UP (ref 27–34)
MCHC RBC-ENTMCNC: 30.5 GM/DL — LOW (ref 32–36)
MCHC RBC-ENTMCNC: 32.1 PG — SIGNIFICANT CHANGE UP (ref 27–34)
MCHC RBC-ENTMCNC: 35.1 GM/DL — SIGNIFICANT CHANGE UP (ref 32–36)
MCV RBC AUTO: 91.5 FL — SIGNIFICANT CHANGE UP (ref 80–100)
MCV RBC AUTO: 99.1 FL — SIGNIFICANT CHANGE UP (ref 80–100)
OB PNL STL: POSITIVE
PLATELET # BLD AUTO: 302 K/UL — SIGNIFICANT CHANGE UP (ref 150–400)
PLATELET # BLD AUTO: 345 K/UL — SIGNIFICANT CHANGE UP (ref 150–400)
POTASSIUM SERPL-MCNC: 4.7 MMOL/L — SIGNIFICANT CHANGE UP (ref 3.5–5.3)
POTASSIUM SERPL-SCNC: 4.7 MMOL/L — SIGNIFICANT CHANGE UP (ref 3.5–5.3)
RBC # BLD: 2.28 M/UL — LOW (ref 3.8–5.2)
RBC # BLD: 2.84 M/UL — LOW (ref 3.8–5.2)
RBC # FLD: 13.9 % — SIGNIFICANT CHANGE UP (ref 10.3–14.5)
RBC # FLD: 14.5 % — SIGNIFICANT CHANGE UP (ref 10.3–14.5)
SODIUM SERPL-SCNC: 142 MMOL/L — SIGNIFICANT CHANGE UP (ref 135–145)
WBC # BLD: 6.73 K/UL — SIGNIFICANT CHANGE UP (ref 3.8–10.5)
WBC # BLD: 9.1 K/UL — SIGNIFICANT CHANGE UP (ref 3.8–10.5)
WBC # FLD AUTO: 6.73 K/UL — SIGNIFICANT CHANGE UP (ref 3.8–10.5)
WBC # FLD AUTO: 9.1 K/UL — SIGNIFICANT CHANGE UP (ref 3.8–10.5)

## 2019-07-03 PROCEDURE — 99233 SBSQ HOSP IP/OBS HIGH 50: CPT

## 2019-07-03 RX ORDER — ASPIRIN/CALCIUM CARB/MAGNESIUM 324 MG
1 TABLET ORAL
Qty: 0 | Refills: 0 | DISCHARGE
Start: 2019-07-03

## 2019-07-03 RX ADMIN — Medication 975 MILLIGRAM(S): at 05:33

## 2019-07-03 RX ADMIN — APIXABAN 5 MILLIGRAM(S): 2.5 TABLET, FILM COATED ORAL at 05:33

## 2019-07-03 RX ADMIN — Medication 325 MILLIGRAM(S): at 12:00

## 2019-07-03 RX ADMIN — Medication 100 MILLIGRAM(S): at 05:33

## 2019-07-03 RX ADMIN — Medication 975 MILLIGRAM(S): at 00:02

## 2019-07-03 RX ADMIN — APIXABAN 5 MILLIGRAM(S): 2.5 TABLET, FILM COATED ORAL at 18:11

## 2019-07-03 RX ADMIN — LOSARTAN POTASSIUM 100 MILLIGRAM(S): 100 TABLET, FILM COATED ORAL at 05:33

## 2019-07-03 RX ADMIN — AMLODIPINE BESYLATE 5 MILLIGRAM(S): 2.5 TABLET ORAL at 05:33

## 2019-07-03 RX ADMIN — Medication 975 MILLIGRAM(S): at 21:31

## 2019-07-03 RX ADMIN — LIDOCAINE 1 PATCH: 4 CREAM TOPICAL at 12:00

## 2019-07-03 RX ADMIN — Medication 5 MILLIGRAM(S): at 12:00

## 2019-07-03 RX ADMIN — SIMVASTATIN 20 MILLIGRAM(S): 20 TABLET, FILM COATED ORAL at 21:31

## 2019-07-03 RX ADMIN — PANTOPRAZOLE SODIUM 40 MILLIGRAM(S): 20 TABLET, DELAYED RELEASE ORAL at 05:33

## 2019-07-03 NOTE — CHART NOTE - NSCHARTNOTEFT_GEN_A_CORE
Chief Complaint: Pt with positive stool occult    NP Note (episodic)     HPI:  Called in by RN, pt with + stool occult, seen and examined at bedside, pt asymptomatic, vital signs stable, pt with no overt signs of bleeding, including coffee ground emesis, melena, BRBPR or hematuria. Pt also denies any lightheadedness, weakness, SOB, CP.         REVIEW OF SYSTEMS:  CONSTITUTIONAL: No weakness, fevers or chills  EYES/ENT: No visual changes;  No vertigo or throat pain   NECK: No pain or stiffness  RESPIRATORY: No cough, wheezing, hemoptysis; No shortness of breath  CARDIOVASCULAR: No chest pain or palpitations  GASTROINTESTINAL: No abdominal or epigastric pain. No nausea, vomiting, or hematemesis; No diarrhea or constipation. No melena or hematochezia.  MUSCULOSKELETAL: No joint pain, stiffness, or swelling, Normal ROM  GENITOURINARY: No dysuria, frequency or hematuria  NEUROLOGICAL: No numbness or weakness  SKIN: No itching, burning, rashes, or lesions   All other review of systems is negative unless indicated above.      PAST MEDICAL & SURGICAL HISTORY:  Pleural effusion on left  Splenic abscess  Polio  High cholesterol  Hypertension  History of appendectomy        Allergies    Drug Allergies Not Recorded  Peaches (Rash)      Vital Signs Last 24 Hrs  T(C): 36.9 (03 Jul 2019 20:28), Max: 37 (03 Jul 2019 13:37)  T(F): 98.5 (03 Jul 2019 20:28), Max: 98.6 (03 Jul 2019 13:37)  HR: 84 (03 Jul 2019 20:28) (70 - 84)  BP: 130/64 (03 Jul 2019 20:28) (108/58 - 138/64)  BP(mean): --  RR: 18 (03 Jul 2019 20:28) (18 - 18)  SpO2: 99% (03 Jul 2019 20:28) (95% - 99%)        Physical Exam:  General:  NAD, AOx3, nontoxic appearing  Head:  NC/AT, no scleral injection, no JVD   CV: RRR, S1S2   Respiratory: CTA B/L, nonlabored  Abdominal: Soft, NT, ND no palpable mass, no guarding or rebound tenderness  MSK: +RLE edema, + peripheral pulses, FROM all 4 extremity Dressing C/D/I      Labs:                          9.1    9.1   )-----------( 345      ( 03 Jul 2019 18:02 )             26.0     07-03    142  |  107  |  58<H>  ----------------------------<  105<H>  4.7   |  23  |  1.58<H>    Ca    9.4      03 Jul 2019 07:13    Occult Blood, Feces (07.03.19 @ 19:33)    Occult Blood, Feces: Positive      Assessment & Plan:  89 yo F with a PMH of HTN, HLD, A-Fib (on Eliquis) and Polio (w/ residual LLE weakness) admitted for right femoral neck fracture after a mechanical fall. Pt  s/p R Hemiarthroplasty with Orthopedic. Hospital course complicated by new onset left facial droop, RUE weakness w/ decreased sensation, MRI brain with small acute right MCA territory infarcts. Carotid doppler showed right ICA stenosis. Pt also noted with H/H trending down post op, requiring a total of 2 units of PRBC. H/H now 9.1 today s/p 1 unit given today, stool occult sent to r/o GIB, found with + guiac.   Dr. Damian made aware of + OB, verbalized to continuing Eliquis and ASA d/t recent CVA, also continue with p.o. Protonix.   Reviewed recent Hgb of 9.1 post transfusion around 1800, f/u CBC in AM as per Dr. Damian   GI consult with Dr. Brown requested called   Daughter aware of stool occult sent today requested to be called with result as per day Nena FIGUEROA, notified daughterSierra of result and plan of care.   Follow up with Primary Team in AM.    Maite Almanzar, RJ  [id number]

## 2019-07-03 NOTE — PROGRESS NOTE ADULT - SUBJECTIVE AND OBJECTIVE BOX
Patient is a 89y old  Female who presents with a chief complaint of hip fracture.  s/p mech fall in the kitchen onto R hip when right leg gave way, now c/o severe right hip pain and inability to ambulate.  Patient denies headstrike or LOC. Localizes pain to right hip/femur/knee. Patient denies radiation of pain. Patient denies numbness/tingling/burning in the RLE. No other bone/joint complaints. Patient is a community ambulator at baseline with a rolling walker. Patient has no issues w/ ADLs/IADLs. The patient underwent a right hemiarthroplasty ORIF surgery on 6/22/2019. The patient is now postop, feeling well and complaining of moderate right hip incisional pain. When last seen on the morning of 6/23/19, she is wanted to be out of bed to chair and begin ambulation. She had an uneventful day until 6:30 PM when she noted weakness of her left hand and altered ability to move her fingers.  Her daughter noticed facial weakness and slurring of words.  No staff was notified and the patient remained this way until next examined by physical therapy on the morning of 6/24/19.  She was found to have  significant left hemiparesis of the left arm greater than left leg, with facial droop and difficulty articulating.  The patient was alert and oriented x3 and she answered questions appropriately.  She had clear weakness in the left arm and left leg as compared to the day before.  EKG was obtained and she was in normal sinus rhythm,  with no ST-T wave changes. Blood pressure was low between 100- 120 systolic.  Blood pressure medications were held, unless blood pressure was greater than 130 systolic.  The patient had a neurological consultation and  right brain CVA was suspected.  The patient underwent CT of the brain at this time showed no hemorrhages or masses.  There was no hydrocephalus.  Possibility of infarction was present and an MRI was advised.  After the CT scan of the brain the patient received 325 mg of aspirin.  It was now approximately 16 hours after the onset of left hemiparesis.  The patient was transferred to a monitored bed to rule out  episodic paroxysmal atrial fibrillation that could have caused her  acute CVA.  The patient had previously been on 2.5 mg Eliquis twice a day and dosage wasnow increased to 5 mg twice a day for therapeutic anticoagulation.  Her rhythm has remained  NSR with a pulse of 64.  The patient has no pain and with the exception of left-sided weakness, has no changes in her condition.  She complains bitterly of right leg postoperative pain  The patient has no new medical complaints at this time. 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  The patient and daughter were fully  informed of right carotid critical stenosis. Telemetry continues with NSR  and no arrhythmias. Patient seen resting comfortably. speech is minimally slurred and Patient is moving all extremities. Patient found to have worsening anemia. States she is feeling well. denies any dizziness or chest pain.      MEDICATIONS  (STANDING):  acetaminophen   Tablet .. 975 milliGRAM(s) Oral every 8 hours  amLODIPine   Tablet 5 milliGRAM(s) Oral daily  apixaban 5 milliGRAM(s) Oral two times a day  aspirin 325 milliGRAM(s) Oral daily  docusate sodium 100 milliGRAM(s) Oral three times a day  lidocaine   Patch 1 Patch Transdermal every 24 hours  losartan 100 milliGRAM(s) Oral daily  oxybutynin 5 milliGRAM(s) Oral daily  pantoprazole    Tablet 40 milliGRAM(s) Oral before breakfast  polyethylene glycol 3350 17 Gram(s) Oral daily  senna 2 Tablet(s) Oral at bedtime  simvastatin 20 milliGRAM(s) Oral at bedtime    MEDICATIONS  (PRN):  bisacodyl Suppository 10 milliGRAM(s) Rectal daily PRN If no bowel movement  magnesium hydroxide Suspension 30 milliLiter(s) Oral daily PRN Constipation  ondansetron Injectable 4 milliGRAM(s) IV Push every 6 hours PRN Nausea and/or Vomiting          VITALS:   T(C): 36.9 (07-03-19 @ 20:28), Max: 37 (07-03-19 @ 13:37)  HR: 84 (07-03-19 @ 20:28) (70 - 84)  BP: 130/64 (07-03-19 @ 20:28) (108/58 - 138/64)  RR: 18 (07-03-19 @ 20:28) (18 - 18)  SpO2: 99% (07-03-19 @ 20:28) (95% - 99%)  Wt(kg): --    PHYSICAL EXAM:  GENERAL: NAD, well nourished and conversant  HEAD:  Atraumatic  EYES: EOM, PERRLA, conjunctiva pink and sclera white  ENT: No tonsillar erythema, exudates, or enlargement, moist mucous membranes, good dentition, no lesions  NECK: Supple, No JVD, normal thyroid, carotids with normal upstrokes and no bruits  CHEST/LUNG: Clear to auscultation bilaterally, No rales, rhonchi, wheezing, or rubs  HEART: Regular rate and rhythm, No murmurs, rubs, or gallops  ABDOMEN: Soft, nondistended, no masses, guarding, tenderness or rebound, bowel sounds present  EXTREMITIES:  swelling of right LE  LYMPH: No lymphadenopathy noted  SKIN: No rashes or lesions  NERVOUS SYSTEM:  Alert & Oriented X3, normal cognitive function. Motor Strength 5/5 right upper and right lower.  5/5 left upper and left lower extremities, DTRs 2+ intact and symmetric      LABS:        CBC Full  -  ( 03 Jul 2019 18:02 )  WBC Count : 9.1 K/uL  RBC Count : 2.84 M/uL  Hemoglobin : 9.1 g/dL  Hematocrit : 26.0 %  Platelet Count - Automated : 345 K/uL  Mean Cell Volume : 91.5 fl  Mean Cell Hemoglobin : 32.1 pg  Mean Cell Hemoglobin Concentration : 35.1 gm/dL  Auto Neutrophil # : x  Auto Lymphocyte # : x  Auto Monocyte # : x  Auto Eosinophil # : x  Auto Basophil # : x  Auto Neutrophil % : x  Auto Lymphocyte % : x  Auto Monocyte % : x  Auto Eosinophil % : x  Auto Basophil % : x    07-03    142  |  107  |  58<H>  ----------------------------<  105<H>  4.7   |  23  |  1.58<H>    Ca    9.4      03 Jul 2019 07:13            CAPILLARY BLOOD GLUCOSE          RADIOLOGY & ADDITIONAL TESTS:

## 2019-07-04 LAB
ANION GAP SERPL CALC-SCNC: 11 MMOL/L — SIGNIFICANT CHANGE UP (ref 5–17)
BUN SERPL-MCNC: 58 MG/DL — HIGH (ref 7–23)
CALCIUM SERPL-MCNC: 9.6 MG/DL — SIGNIFICANT CHANGE UP (ref 8.4–10.5)
CHLORIDE SERPL-SCNC: 110 MMOL/L — HIGH (ref 96–108)
CO2 SERPL-SCNC: 22 MMOL/L — SIGNIFICANT CHANGE UP (ref 22–31)
CREAT SERPL-MCNC: 1.5 MG/DL — HIGH (ref 0.5–1.3)
GLUCOSE SERPL-MCNC: 99 MG/DL — SIGNIFICANT CHANGE UP (ref 70–99)
HCT VFR BLD CALC: 23.5 % — LOW (ref 34.5–45)
HCT VFR BLD CALC: 24.8 % — LOW (ref 34.5–45)
HGB BLD-MCNC: 7.6 G/DL — LOW (ref 11.5–15.5)
HGB BLD-MCNC: 8.2 G/DL — LOW (ref 11.5–15.5)
MCHC RBC-ENTMCNC: 29 PG — SIGNIFICANT CHANGE UP (ref 27–34)
MCHC RBC-ENTMCNC: 30.6 GM/DL — LOW (ref 32–36)
MCHC RBC-ENTMCNC: 32 PG — SIGNIFICANT CHANGE UP (ref 27–34)
MCHC RBC-ENTMCNC: 35 GM/DL — SIGNIFICANT CHANGE UP (ref 32–36)
MCV RBC AUTO: 91.4 FL — SIGNIFICANT CHANGE UP (ref 80–100)
MCV RBC AUTO: 94.7 FL — SIGNIFICANT CHANGE UP (ref 80–100)
PLATELET # BLD AUTO: 328 K/UL — SIGNIFICANT CHANGE UP (ref 150–400)
PLATELET # BLD AUTO: 378 K/UL — SIGNIFICANT CHANGE UP (ref 150–400)
POTASSIUM SERPL-MCNC: 4.4 MMOL/L — SIGNIFICANT CHANGE UP (ref 3.5–5.3)
POTASSIUM SERPL-SCNC: 4.4 MMOL/L — SIGNIFICANT CHANGE UP (ref 3.5–5.3)
RBC # BLD: 2.57 M/UL — LOW (ref 3.8–5.2)
RBC # BLD: 2.62 M/UL — LOW (ref 3.8–5.2)
RBC # FLD: 15.4 % — HIGH (ref 10.3–14.5)
RBC # FLD: 16.5 % — HIGH (ref 10.3–14.5)
SODIUM SERPL-SCNC: 143 MMOL/L — SIGNIFICANT CHANGE UP (ref 135–145)
WBC # BLD: 8.2 K/UL — SIGNIFICANT CHANGE UP (ref 3.8–10.5)
WBC # BLD: 8.49 K/UL — SIGNIFICANT CHANGE UP (ref 3.8–10.5)
WBC # FLD AUTO: 8.2 K/UL — SIGNIFICANT CHANGE UP (ref 3.8–10.5)
WBC # FLD AUTO: 8.49 K/UL — SIGNIFICANT CHANGE UP (ref 3.8–10.5)

## 2019-07-04 PROCEDURE — 76882 US LMTD JT/FCL EVL NVASC XTR: CPT | Mod: 26,RT

## 2019-07-04 RX ORDER — SODIUM CHLORIDE 9 MG/ML
500 INJECTION INTRAMUSCULAR; INTRAVENOUS; SUBCUTANEOUS ONCE
Refills: 0 | Status: DISCONTINUED | OUTPATIENT
Start: 2019-07-05 | End: 2019-07-05

## 2019-07-04 RX ADMIN — Medication 975 MILLIGRAM(S): at 16:05

## 2019-07-04 RX ADMIN — APIXABAN 5 MILLIGRAM(S): 2.5 TABLET, FILM COATED ORAL at 06:00

## 2019-07-04 RX ADMIN — Medication 975 MILLIGRAM(S): at 07:25

## 2019-07-04 RX ADMIN — PANTOPRAZOLE SODIUM 40 MILLIGRAM(S): 20 TABLET, DELAYED RELEASE ORAL at 06:00

## 2019-07-04 RX ADMIN — LOSARTAN POTASSIUM 100 MILLIGRAM(S): 100 TABLET, FILM COATED ORAL at 06:00

## 2019-07-04 RX ADMIN — Medication 975 MILLIGRAM(S): at 06:00

## 2019-07-04 RX ADMIN — SIMVASTATIN 20 MILLIGRAM(S): 20 TABLET, FILM COATED ORAL at 21:42

## 2019-07-04 RX ADMIN — LIDOCAINE 1 PATCH: 4 CREAM TOPICAL at 18:24

## 2019-07-04 RX ADMIN — Medication 975 MILLIGRAM(S): at 14:10

## 2019-07-04 RX ADMIN — AMLODIPINE BESYLATE 5 MILLIGRAM(S): 2.5 TABLET ORAL at 06:00

## 2019-07-04 RX ADMIN — LIDOCAINE 1 PATCH: 4 CREAM TOPICAL at 12:43

## 2019-07-04 RX ADMIN — Medication 325 MILLIGRAM(S): at 12:44

## 2019-07-04 RX ADMIN — APIXABAN 5 MILLIGRAM(S): 2.5 TABLET, FILM COATED ORAL at 18:24

## 2019-07-04 RX ADMIN — Medication 5 MILLIGRAM(S): at 12:44

## 2019-07-04 RX ADMIN — Medication 975 MILLIGRAM(S): at 21:42

## 2019-07-04 NOTE — PROGRESS NOTE ADULT - SUBJECTIVE AND OBJECTIVE BOX
Patient is a 89y old  Female who presents with a chief complaint of hip fracture.  s/p mech fall in the kitchen onto R hip when right leg gave way, now c/o severe right hip pain and inability to ambulate.  Patient denies headstrike or LOC. Localizes pain to right hip/femur/knee. Patient denies radiation of pain. Patient denies numbness/tingling/burning in the RLE. No other bone/joint complaints. Patient is a community ambulator at baseline with a rolling walker. Patient has no issues w/ ADLs/IADLs. The patient underwent a right hemiarthroplasty ORIF surgery on 6/22/2019. The patient is now postop, feeling well and complaining of moderate right hip incisional pain. When last seen on the morning of 6/23/19, she is wanted to be out of bed to chair and begin ambulation. She had an uneventful day until 6:30 PM when she noted weakness of her left hand and altered ability to move her fingers.  Her daughter noticed facial weakness and slurring of words.  No staff was notified and the patient remained this way until next examined by physical therapy on the morning of 6/24/19.  She was found to have  significant left hemiparesis of the left arm greater than left leg, with facial droop and difficulty articulating.  The patient was alert and oriented x3 and she answered questions appropriately.  She had clear weakness in the left arm and left leg as compared to the day before.  EKG was obtained and she was in normal sinus rhythm,  with no ST-T wave changes. Blood pressure was low between 100- 120 systolic.  Blood pressure medications were held, unless blood pressure was greater than 130 systolic.  The patient had a neurological consultation and  right brain CVA was suspected.  The patient underwent CT of the brain at this time showed no hemorrhages or masses.  There was no hydrocephalus.  Possibility of infarction was present and an MRI was advised.  After the CT scan of the brain the patient received 325 mg of aspirin.  It was now approximately 16 hours after the onset of left hemiparesis.  The patient was transferred to a monitored bed to rule out  episodic paroxysmal atrial fibrillation that could have caused her  acute CVA.  The patient had previously been on 2.5 mg Eliquis twice a day and dosage wasnow increased to 5 mg twice a day for therapeutic anticoagulation.  Her rhythm has remained  NSR with a pulse of 64.  The patient has no pain and with the exception of left-sided weakness, has no changes in her condition.  She complains bitterly of right leg postoperative pain  The patient has no new medical complaints at this time. 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  The patient and daughter were fully  informed of right carotid critical stenosis. Telemetry continues with NSR  and no arrhythmias. Patient seen resting comfortably. speech is minimally slurred and Patient is moving all extremities. Patient found to have worsening anemia. States she is feeling well. denies any dizziness or chest pain. She complained of right groin pain Sono  showed fluid collection near femur but no aneurysm femur but no aneurysm  Because of persistent decrease in Hgn ordered a Ct to r/o a retroperitoneal bleed      MEDICATIONS  (STANDING):  acetaminophen   Tablet .. 975 milliGRAM(s) Oral every 8 hours  amLODIPine   Tablet 5 milliGRAM(s) Oral daily  apixaban 5 milliGRAM(s) Oral two times a day  aspirin 325 milliGRAM(s) Oral daily  docusate sodium 100 milliGRAM(s) Oral three times a day  lidocaine   Patch 1 Patch Transdermal every 24 hours  losartan 100 milliGRAM(s) Oral daily  oxybutynin 5 milliGRAM(s) Oral daily  pantoprazole    Tablet 40 milliGRAM(s) Oral before breakfast  polyethylene glycol 3350 17 Gram(s) Oral daily  senna 2 Tablet(s) Oral at bedtime  simvastatin 20 milliGRAM(s) Oral at bedtime    MEDICATIONS  (PRN):  bisacodyl Suppository 10 milliGRAM(s) Rectal daily PRN If no bowel movement  magnesium hydroxide Suspension 30 milliLiter(s) Oral daily PRN Constipation  ondansetron Injectable 4 milliGRAM(s) IV Push every 6 hours PRN Nausea and/or Vomiting      VITAL:  T(C): , Max: 37 (07-03-19 @ 13:37)  T(F): , Max: 98.6 (07-03-19 @ 13:37)  HR: 77 (07-04-19 @ 04:39)  BP: 115/58 (07-04-19 @ 04:39)  BP(mean): --  RR: 18 (07-04-19 @ 04:39)  SpO2: 92% (07-04-19 @ 04:39)        PHYSICAL EXAM:  GENERAL: NAD, well nourished and conversant  HEAD:  Atraumatic  EYES: EOM, PERRLA, conjunctiva pink and sclera white  ENT: No tonsillar erythema, exudates, or enlargement, moist mucous membranes, good dentition, no lesions  NECK: Supple, No JVD, normal thyroid, carotids with normal upstrokes and no bruits  CHEST/LUNG: Clear to auscultation bilaterally, No rales, rhonchi, wheezing, or rubs  HEART: Regular rate and rhythm, No murmurs, rubs, or gallops  ABDOMEN: Soft, nondistended, no masses, guarding, tenderness or rebound, bowel sounds present  EXTREMITIES:  swelling of right LE  right groins          LYMPH: No lymphadenopathy noted  SKIN: No rashes or lesions  NERVOUS SYSTEM:  Alert & Oriented X3, normal cognitive function. Motor Strength 5/5 right upper and right lower.  5/5 left upper and left lower extremities, DTRs 2+ intact and symmetric      LABS:            CBC Full  -  ( 04 Jul 2019 21:56 )  WBC Count : 8.2 K/uL  RBC Count : 2.57 M/uL  Hemoglobin : 8.2 g/dL  Hematocrit : 23.5 %  Platelet Count - Automated : 378 K/uL  Mean Cell Volume : 91.4 fl  Mean Cell Hemoglobin : 32.0 pg  Mean Cell Hemoglobin Concentration : 35.0 gm/dL  Auto Neutrophil # : x  Auto Lymphocyte # : x  Auto Monocyte # : x  Auto Eosinophil # : x  Auto Basophil # : x  Auto Neutrophil % : x  Auto Lymphocyte % : x  Auto Monocyte % : x  Auto Eosinophil % : x  Auto Basophil % : x    07-04    143  |  110<H>  |  58<H>  ----------------------------<  99  4.4   |  22  |  1.50<H>    Ca    9.6      04 Jul 2019 07:15                            CAPILLARY BLOOD GLUCOSE          RADIOLOGY & ADDITIONAL TESTS:

## 2019-07-04 NOTE — CONSULT NOTE ADULT - ASSESSMENT
pt with drop in hgb and guiac positive, on elquies. continue ac. stool brown. (dark by nurses but no melena now).  ppi appropreiate.  will consider endosocpic eval if drop in hgb or black stool

## 2019-07-04 NOTE — PROGRESS NOTE ADULT - ASSESSMENT
89F hx of Open reduction and internal fixation of hip. Patient s/p cerebrovascular accident due to carotid stenosis. Patient agreeable to CEA Yes

## 2019-07-04 NOTE — CHART NOTE - NSCHARTNOTEFT_GEN_A_CORE
Patient complains of pain on right groin - 7/10 constant pain worse on movement and palpation. In the setting of drop in H/H will do sonogram pelvis to look for fluid collection    [id number]

## 2019-07-04 NOTE — CONSULT NOTE ADULT - SUBJECTIVE AND OBJECTIVE BOX
Patient is a 89y Female     Patient is a 89y old  Female who presents with a chief complaint of hip fracture (03 Jul 2019 21:29)      HPI:  89y Female presents to [facility] ED s/p mech fall in the kitchen onto R hip when right leg gave way, now c/o severe right hip pain and inability to ambulate.  Patient denies headstrike or LOC. Localizes pain to right hip/femur/knee. Patient denies radiation of pain. Patient denies numbness/tingling/burning in the RLE. No other bone/joint complaints. Patient is a community ambulator at baseline with a rolling walker. Patient has no issues w/ ADLs/IADLs. (22 Jun 2019 04:04)      PAST MEDICAL & SURGICAL HISTORY:  Pleural effusion on left  Splenic abscess  Polio  High cholesterol  Hypertension  History of appendectomy      MEDICATIONS  (STANDING):  acetaminophen   Tablet .. 975 milliGRAM(s) Oral every 8 hours  amLODIPine   Tablet 5 milliGRAM(s) Oral daily  apixaban 5 milliGRAM(s) Oral two times a day  aspirin 325 milliGRAM(s) Oral daily  docusate sodium 100 milliGRAM(s) Oral three times a day  lidocaine   Patch 1 Patch Transdermal every 24 hours  losartan 100 milliGRAM(s) Oral daily  oxybutynin 5 milliGRAM(s) Oral daily  pantoprazole    Tablet 40 milliGRAM(s) Oral before breakfast  polyethylene glycol 3350 17 Gram(s) Oral daily  senna 2 Tablet(s) Oral at bedtime  simvastatin 20 milliGRAM(s) Oral at bedtime      Allergies    Drug Allergies Not Recorded  Peaches (Rash)    Intolerances        SOCIAL HISTORY:  Denies ETOh,Smoking,     FAMILY HISTORY:  No pertinent family history in first degree relatives      REVIEW OF SYSTEMS:    CONSTITUTIONAL: No weakness, fevers or chills  EYES/ENT: No visual changes;  No vertigo or throat pain   NECK: No pain or stiffness  RESPIRATORY: No cough, wheezing, hemoptysis; No shortness of breath  CARDIOVASCULAR: No chest pain or palpitations  GASTROINTESTINAL: No abdominal or epigastric pain. No nausea, vomiting, or hematemesis; No diarrhea or constipation. No melena or hematochezia.  GENITOURINARY: No dysuria, frequency or hematuria  NEUROLOGICAL: No numbness or weakness  SKIN: No itching, burning, rashes, or lesions   All other review of systems is negative unless indicated above.    VITAL:  T(C): , Max: 37 (07-03-19 @ 13:37)  T(F): , Max: 98.6 (07-03-19 @ 13:37)  HR: 77 (07-04-19 @ 04:39)  BP: 115/58 (07-04-19 @ 04:39)  BP(mean): --  RR: 18 (07-04-19 @ 04:39)  SpO2: 92% (07-04-19 @ 04:39)  Wt(kg): --    I and O's:    07-02 @ 07:01  -  07-03 @ 07:00  --------------------------------------------------------  IN: 600 mL / OUT: 850 mL / NET: -250 mL    07-03 @ 07:01  -  07-04 @ 07:00  --------------------------------------------------------  IN: 600 mL / OUT: 950 mL / NET: -350 mL          PHYSICAL EXAM:    Constitutional: NAD  HEENT: PERRLA,   Neck: No JVD  Respiratory: CTA B/L  Cardiovascular: S1 and S2  Gastrointestinal: BS+, soft, NT/ND  Extremities: No peripheral edema  Neurological: A/O x 3, no focal deficits  Psychiatric: Normal mood, normal affect  : No Snyder  Skin: No rashes  Access: Not applicable  Back: No CVA tenderness    LABS:                        7.6    8.49  )-----------( 328      ( 04 Jul 2019 09:32 )             24.8     07-04    143  |  110<H>  |  58<H>  ----------------------------<  99  4.4   |  22  |  1.50<H>    Ca    9.6      04 Jul 2019 07:15            RADIOLOGY & ADDITIONAL STUDIES:

## 2019-07-05 LAB
ALBUMIN SERPL ELPH-MCNC: 2.9 G/DL — LOW (ref 3.3–5)
ALP SERPL-CCNC: 104 U/L — SIGNIFICANT CHANGE UP (ref 40–120)
ALT FLD-CCNC: 6 U/L — LOW (ref 10–45)
ANION GAP SERPL CALC-SCNC: 9 MMOL/L — SIGNIFICANT CHANGE UP (ref 5–17)
AST SERPL-CCNC: 12 U/L — SIGNIFICANT CHANGE UP (ref 10–40)
BILIRUB DIRECT SERPL-MCNC: <0.1 MG/DL — SIGNIFICANT CHANGE UP (ref 0–0.2)
BILIRUB SERPL-MCNC: 0.3 MG/DL — SIGNIFICANT CHANGE UP (ref 0.2–1.2)
BLD GP AB SCN SERPL QL: NEGATIVE — SIGNIFICANT CHANGE UP
BUN SERPL-MCNC: 52 MG/DL — HIGH (ref 7–23)
CALCIUM SERPL-MCNC: 9 MG/DL — SIGNIFICANT CHANGE UP (ref 8.4–10.5)
CHLORIDE SERPL-SCNC: 110 MMOL/L — HIGH (ref 96–108)
CO2 SERPL-SCNC: 24 MMOL/L — SIGNIFICANT CHANGE UP (ref 22–31)
CREAT SERPL-MCNC: 1.51 MG/DL — HIGH (ref 0.5–1.3)
GLUCOSE SERPL-MCNC: 98 MG/DL — SIGNIFICANT CHANGE UP (ref 70–99)
HAPTOGLOB SERPL-MCNC: 201 MG/DL — HIGH (ref 34–200)
HCT VFR BLD CALC: 27 % — LOW (ref 34.5–45)
HGB BLD-MCNC: 9.1 G/DL — LOW (ref 11.5–15.5)
LDH SERPL L TO P-CCNC: 142 U/L — SIGNIFICANT CHANGE UP (ref 50–242)
MAGNESIUM SERPL-MCNC: 2.1 MG/DL — SIGNIFICANT CHANGE UP (ref 1.6–2.6)
MCHC RBC-ENTMCNC: 30.9 PG — SIGNIFICANT CHANGE UP (ref 27–34)
MCHC RBC-ENTMCNC: 33.6 GM/DL — SIGNIFICANT CHANGE UP (ref 32–36)
MCV RBC AUTO: 91.7 FL — SIGNIFICANT CHANGE UP (ref 80–100)
PHOSPHATE SERPL-MCNC: 4.1 MG/DL — SIGNIFICANT CHANGE UP (ref 2.5–4.5)
PLATELET # BLD AUTO: 347 K/UL — SIGNIFICANT CHANGE UP (ref 150–400)
POTASSIUM SERPL-MCNC: 4.4 MMOL/L — SIGNIFICANT CHANGE UP (ref 3.5–5.3)
POTASSIUM SERPL-SCNC: 4.4 MMOL/L — SIGNIFICANT CHANGE UP (ref 3.5–5.3)
PROT SERPL-MCNC: 5.4 G/DL — LOW (ref 6–8.3)
RBC # BLD: 2.94 M/UL — LOW (ref 3.8–5.2)
RBC # FLD: 14.9 % — HIGH (ref 10.3–14.5)
RH IG SCN BLD-IMP: POSITIVE — SIGNIFICANT CHANGE UP
SODIUM SERPL-SCNC: 143 MMOL/L — SIGNIFICANT CHANGE UP (ref 135–145)
URATE SERPL-MCNC: 7.6 MG/DL — HIGH (ref 2.5–7)
WBC # BLD: 7.3 K/UL — SIGNIFICANT CHANGE UP (ref 3.8–10.5)
WBC # FLD AUTO: 7.3 K/UL — SIGNIFICANT CHANGE UP (ref 3.8–10.5)

## 2019-07-05 PROCEDURE — 74176 CT ABD & PELVIS W/O CONTRAST: CPT | Mod: 26

## 2019-07-05 RX ORDER — PANTOPRAZOLE SODIUM 20 MG/1
40 TABLET, DELAYED RELEASE ORAL
Refills: 0 | Status: DISCONTINUED | OUTPATIENT
Start: 2019-07-05 | End: 2019-07-13

## 2019-07-05 RX ADMIN — Medication 975 MILLIGRAM(S): at 00:34

## 2019-07-05 RX ADMIN — PANTOPRAZOLE SODIUM 40 MILLIGRAM(S): 20 TABLET, DELAYED RELEASE ORAL at 17:36

## 2019-07-05 RX ADMIN — LOSARTAN POTASSIUM 100 MILLIGRAM(S): 100 TABLET, FILM COATED ORAL at 05:25

## 2019-07-05 RX ADMIN — Medication 975 MILLIGRAM(S): at 14:14

## 2019-07-05 RX ADMIN — SIMVASTATIN 20 MILLIGRAM(S): 20 TABLET, FILM COATED ORAL at 22:08

## 2019-07-05 RX ADMIN — LIDOCAINE 1 PATCH: 4 CREAM TOPICAL at 00:34

## 2019-07-05 RX ADMIN — Medication 975 MILLIGRAM(S): at 07:34

## 2019-07-05 RX ADMIN — Medication 5 MILLIGRAM(S): at 14:12

## 2019-07-05 RX ADMIN — AMLODIPINE BESYLATE 5 MILLIGRAM(S): 2.5 TABLET ORAL at 05:25

## 2019-07-05 RX ADMIN — Medication 975 MILLIGRAM(S): at 15:14

## 2019-07-05 RX ADMIN — APIXABAN 5 MILLIGRAM(S): 2.5 TABLET, FILM COATED ORAL at 17:36

## 2019-07-05 RX ADMIN — Medication 975 MILLIGRAM(S): at 05:24

## 2019-07-05 RX ADMIN — APIXABAN 5 MILLIGRAM(S): 2.5 TABLET, FILM COATED ORAL at 05:24

## 2019-07-05 RX ADMIN — PANTOPRAZOLE SODIUM 40 MILLIGRAM(S): 20 TABLET, DELAYED RELEASE ORAL at 05:25

## 2019-07-05 RX ADMIN — Medication 325 MILLIGRAM(S): at 14:12

## 2019-07-05 RX ADMIN — Medication 975 MILLIGRAM(S): at 22:10

## 2019-07-05 RX ADMIN — Medication 975 MILLIGRAM(S): at 22:08

## 2019-07-05 RX ADMIN — Medication 100 MILLIGRAM(S): at 14:14

## 2019-07-05 RX ADMIN — Medication 100 MILLIGRAM(S): at 22:09

## 2019-07-05 RX ADMIN — SENNA PLUS 2 TABLET(S): 8.6 TABLET ORAL at 22:09

## 2019-07-05 NOTE — PROGRESS NOTE ADULT - ASSESSMENT
agree with dr. davis re: possible thigh bleed. pt for ct.  guiac positive, but no overt bleeding. conserative gi managment.  elequis would need to be held if endosocpic evlauation persued.  three days.  defer gi workup for now as risk> benefit

## 2019-07-05 NOTE — PROGRESS NOTE ADULT - ASSESSMENT
89F hx of Open reduction and internal fixation of hip. Patient s/p cerebrovascular accident due to carotid stenosis. found to have guaiac positive stool

## 2019-07-05 NOTE — PROGRESS NOTE ADULT - SUBJECTIVE AND OBJECTIVE BOX
BRAD NUÑEZ:[id number],   89yFemale followed for:  No Known Drug Allergies  Peaches (Rash)    PAST MEDICAL & SURGICAL HISTORY:  Pleural effusion on left  Splenic abscess  Polio  High cholesterol  Hypertension  History of appendectomy    FAMILY HISTORY:  No pertinent family history in first degree relatives    MEDICATIONS  (STANDING):  acetaminophen   Tablet .. 975 milliGRAM(s) Oral every 8 hours  amLODIPine   Tablet 5 milliGRAM(s) Oral daily  apixaban 5 milliGRAM(s) Oral two times a day  aspirin 325 milliGRAM(s) Oral daily  docusate sodium 100 milliGRAM(s) Oral three times a day  lidocaine   Patch 1 Patch Transdermal every 24 hours  losartan 100 milliGRAM(s) Oral daily  oxybutynin 5 milliGRAM(s) Oral daily  pantoprazole    Tablet 40 milliGRAM(s) Oral before breakfast  polyethylene glycol 3350 17 Gram(s) Oral daily  senna 2 Tablet(s) Oral at bedtime  simvastatin 20 milliGRAM(s) Oral at bedtime    MEDICATIONS  (PRN):  bisacodyl Suppository 10 milliGRAM(s) Rectal daily PRN If no bowel movement  magnesium hydroxide Suspension 30 milliLiter(s) Oral daily PRN Constipation  ondansetron Injectable 4 milliGRAM(s) IV Push every 6 hours PRN Nausea and/or Vomiting      Vital Signs Last 24 Hrs  T(C): 36.8 (05 Jul 2019 04:12), Max: 37.3 (04 Jul 2019 14:09)  T(F): 98.3 (05 Jul 2019 04:12), Max: 99.1 (04 Jul 2019 14:09)  HR: 68 (05 Jul 2019 04:12) (68 - 78)  BP: 108/65 (05 Jul 2019 04:12) (108/65 - 117/58)  BP(mean): --  RR: 18 (05 Jul 2019 04:12) (18 - 18)  SpO2: 96% (05 Jul 2019 04:12) (96% - 99%)  nc/at  s1s2  cta  soft, nt, nd no guarding or rebound  no c/c/e    CBC Full  -  ( 05 Jul 2019 06:48 )  WBC Count : 7.3 K/uL  RBC Count : 2.94 M/uL  Hemoglobin : 9.1 g/dL  Hematocrit : 27.0 %  Platelet Count - Automated : 347 K/uL  Mean Cell Volume : 91.7 fl  Mean Cell Hemoglobin : 30.9 pg  Mean Cell Hemoglobin Concentration : 33.6 gm/dL  Auto Neutrophil # : x  Auto Lymphocyte # : x  Auto Monocyte # : x  Auto Eosinophil # : x  Auto Basophil # : x  Auto Neutrophil % : x  Auto Lymphocyte % : x  Auto Monocyte % : x  Auto Eosinophil % : x  Auto Basophil % : x    07-05    143  |  110<H>  |  52<H>  ----------------------------<  98  4.4   |  24  |  1.51<H>    Ca    9.0      05 Jul 2019 06:48  Phos  4.1     07-05  Mg     2.1     07-05    TPro  5.4<L>  /  Alb  2.9<L>  /  TBili  0.3  /  DBili  <0.1  /  AST  12  /  ALT  6<L>  /  AlkPhos  104  07-05

## 2019-07-05 NOTE — CHART NOTE - NSCHARTNOTEFT_GEN_A_CORE
Hgb noted to be 7.6 from AM labs. Drop from previous result (Hgb = 9.1). Repeat CBC stat ordered. Results noted. VSS                        8.2    8.2   )-----------( 378      ( 04 Jul 2019 21:56 )             23.5     D/w Dr. Conte   1U PRBC ordered   CT A/P to r/o RP bleed   GI f/u in AM   AM labs     will continue to monitor   will endorse to day team     Nieves Burgos PA-C   [id number]

## 2019-07-06 LAB
ANION GAP SERPL CALC-SCNC: 11 MMOL/L — SIGNIFICANT CHANGE UP (ref 5–17)
APTT BLD: 35.5 SEC — SIGNIFICANT CHANGE UP (ref 27.5–36.3)
BUN SERPL-MCNC: 58 MG/DL — HIGH (ref 7–23)
CALCIUM SERPL-MCNC: 9 MG/DL — SIGNIFICANT CHANGE UP (ref 8.4–10.5)
CHLORIDE SERPL-SCNC: 111 MMOL/L — HIGH (ref 96–108)
CO2 SERPL-SCNC: 21 MMOL/L — LOW (ref 22–31)
CREAT SERPL-MCNC: 1.4 MG/DL — HIGH (ref 0.5–1.3)
GLUCOSE SERPL-MCNC: 104 MG/DL — HIGH (ref 70–99)
HCT VFR BLD CALC: 22.7 % — LOW (ref 34.5–45)
HCT VFR BLD CALC: 25.5 % — LOW (ref 34.5–45)
HGB BLD-MCNC: 7.9 G/DL — LOW (ref 11.5–15.5)
HGB BLD-MCNC: 8.5 G/DL — LOW (ref 11.5–15.5)
INR BLD: 1.93 RATIO — HIGH (ref 0.88–1.16)
MCHC RBC-ENTMCNC: 31.1 PG — SIGNIFICANT CHANGE UP (ref 27–34)
MCHC RBC-ENTMCNC: 32.4 PG — SIGNIFICANT CHANGE UP (ref 27–34)
MCHC RBC-ENTMCNC: 33.4 GM/DL — SIGNIFICANT CHANGE UP (ref 32–36)
MCHC RBC-ENTMCNC: 34.9 GM/DL — SIGNIFICANT CHANGE UP (ref 32–36)
MCV RBC AUTO: 92.8 FL — SIGNIFICANT CHANGE UP (ref 80–100)
MCV RBC AUTO: 93 FL — SIGNIFICANT CHANGE UP (ref 80–100)
PLATELET # BLD AUTO: 331 K/UL — SIGNIFICANT CHANGE UP (ref 150–400)
PLATELET # BLD AUTO: 384 K/UL — SIGNIFICANT CHANGE UP (ref 150–400)
POTASSIUM SERPL-MCNC: 4.3 MMOL/L — SIGNIFICANT CHANGE UP (ref 3.5–5.3)
POTASSIUM SERPL-SCNC: 4.3 MMOL/L — SIGNIFICANT CHANGE UP (ref 3.5–5.3)
PROTHROM AB SERPL-ACNC: 22.6 SEC — HIGH (ref 10–12.9)
RBC # BLD: 2.45 M/UL — LOW (ref 3.8–5.2)
RBC # BLD: 2.74 M/UL — LOW (ref 3.8–5.2)
RBC # FLD: 14.9 % — HIGH (ref 10.3–14.5)
RBC # FLD: 15.1 % — HIGH (ref 10.3–14.5)
SODIUM SERPL-SCNC: 143 MMOL/L — SIGNIFICANT CHANGE UP (ref 135–145)
WBC # BLD: 8.6 K/UL — SIGNIFICANT CHANGE UP (ref 3.8–10.5)
WBC # BLD: 9.1 K/UL — SIGNIFICANT CHANGE UP (ref 3.8–10.5)
WBC # FLD AUTO: 8.6 K/UL — SIGNIFICANT CHANGE UP (ref 3.8–10.5)
WBC # FLD AUTO: 9.1 K/UL — SIGNIFICANT CHANGE UP (ref 3.8–10.5)

## 2019-07-06 RX ORDER — HEPARIN SODIUM 5000 [USP'U]/ML
1000 INJECTION INTRAVENOUS; SUBCUTANEOUS
Qty: 25000 | Refills: 0 | Status: DISCONTINUED | OUTPATIENT
Start: 2019-07-06 | End: 2019-07-07

## 2019-07-06 RX ADMIN — Medication 325 MILLIGRAM(S): at 18:49

## 2019-07-06 RX ADMIN — Medication 975 MILLIGRAM(S): at 21:53

## 2019-07-06 RX ADMIN — PANTOPRAZOLE SODIUM 40 MILLIGRAM(S): 20 TABLET, DELAYED RELEASE ORAL at 18:48

## 2019-07-06 RX ADMIN — Medication 975 MILLIGRAM(S): at 05:20

## 2019-07-06 RX ADMIN — Medication 5 MILLIGRAM(S): at 13:33

## 2019-07-06 RX ADMIN — Medication 100 MILLIGRAM(S): at 05:21

## 2019-07-06 RX ADMIN — APIXABAN 5 MILLIGRAM(S): 2.5 TABLET, FILM COATED ORAL at 05:20

## 2019-07-06 RX ADMIN — SIMVASTATIN 20 MILLIGRAM(S): 20 TABLET, FILM COATED ORAL at 21:53

## 2019-07-06 RX ADMIN — HEPARIN SODIUM 10 UNIT(S)/HR: 5000 INJECTION INTRAVENOUS; SUBCUTANEOUS at 19:56

## 2019-07-06 RX ADMIN — PANTOPRAZOLE SODIUM 40 MILLIGRAM(S): 20 TABLET, DELAYED RELEASE ORAL at 05:20

## 2019-07-06 RX ADMIN — Medication 975 MILLIGRAM(S): at 05:30

## 2019-07-06 NOTE — PROGRESS NOTE ADULT - SUBJECTIVE AND OBJECTIVE BOX
Patient is a 89y old  Female who presents with a chief complaint of hip fracture.  s/p mech fall in the kitchen onto R hip when right leg gave way, now c/o severe right hip pain and inability to ambulate.  Patient denies headstrike or LOC. Localizes pain to right hip/femur/knee. Patient denies radiation of pain. Patient denies numbness/tingling/burning in the RLE. No other bone/joint complaints. Patient is a community ambulator at baseline with a rolling walker. Patient has no issues w/ ADLs/IADLs. The patient underwent a right hemiarthroplasty ORIF surgery on 6/22/2019. The patient is now postop, feeling well and complaining of moderate right hip incisional pain. When last seen on the morning of 6/23/19, she is wanted to be out of bed to chair and begin ambulation. She had an uneventful day until 6:30 PM when she noted weakness of her left hand and altered ability to move her fingers.  Her daughter noticed facial weakness and slurring of words.  No staff was notified and the patient remained this way until next examined by physical therapy on the morning of 6/24/19.  She was found to have  significant left hemiparesis of the left arm greater than left leg, with facial droop and difficulty articulating.  The patient was alert and oriented x3 and she answered questions appropriately.  She had clear weakness in the left arm and left leg as compared to the day before.  EKG was obtained and she was in normal sinus rhythm,  with no ST-T wave changes. Blood pressure was low between 100- 120 systolic.  Blood pressure medications were held, unless blood pressure was greater than 130 systolic.  The patient had a neurological consultation and  right brain CVA was suspected.  The patient underwent CT of the brain at this time showed no hemorrhages or masses.  There was no hydrocephalus.  Possibility of infarction was present and an MRI was advised.  After the CT scan of the brain the patient received 325 mg of aspirin.  It was now approximately 16 hours after the onset of left hemiparesis.  The patient was transferred to a monitored bed to rule out  episodic paroxysmal atrial fibrillation that could have caused her  acute CVA.  The patient had previously been on 2.5 mg Eliquis twice a day and dosage wasnow increased to 5 mg twice a day for therapeutic anticoagulation.  Her rhythm has remained  NSR with a pulse of 64.  The patient has no pain and with the exception of left-sided weakness, has no changes in her condition.  She complains bitterly of right leg postoperative pain  The patient has no new medical complaints at this time. 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  The patient and daughter were fully  informed of right carotid critical stenosis. Telemetry continues with NSR  and no arrhythmias. Patient seen resting comfortably. speech is minimally slurred and Patient is moving all extremities. Patient found to have worsening anemia. Pt states she has been feeling well wants to get OOB and move. Patient found to have dark foul smelling stool as well as a drop in her H&H.    MEDICATIONS  (STANDING):  acetaminophen   Tablet .. 975 milliGRAM(s) Oral every 8 hours  amLODIPine   Tablet 5 milliGRAM(s) Oral daily  aspirin 325 milliGRAM(s) Oral daily  docusate sodium 100 milliGRAM(s) Oral three times a day  heparin  Infusion 1000 Unit(s)/Hr (10 mL/Hr) IV Continuous <Continuous>  lidocaine   Patch 1 Patch Transdermal every 24 hours  losartan 100 milliGRAM(s) Oral daily  oxybutynin 5 milliGRAM(s) Oral daily  pantoprazole    Tablet 40 milliGRAM(s) Oral two times a day  polyethylene glycol 3350 17 Gram(s) Oral daily  senna 2 Tablet(s) Oral at bedtime  simvastatin 20 milliGRAM(s) Oral at bedtime    MEDICATIONS  (PRN):  bisacodyl Suppository 10 milliGRAM(s) Rectal daily PRN If no bowel movement  magnesium hydroxide Suspension 30 milliLiter(s) Oral daily PRN Constipation  ondansetron Injectable 4 milliGRAM(s) IV Push every 6 hours PRN Nausea and/or Vomiting          VITALS:   T(C): 36.7 (07-06-19 @ 20:53), Max: 36.8 (07-05-19 @ 21:32)  HR: 72 (07-06-19 @ 20:53) (67 - 72)  BP: 118/55 (07-06-19 @ 20:53) (109/62 - 135/69)  RR: 18 (07-06-19 @ 20:53) (18 - 18)  SpO2: 98% (07-06-19 @ 20:53) (96% - 98%)  Wt(kg): --    PHYSICAL EXAM:  GENERAL: NAD, well nourished and conversant  HEAD:  Atraumatic  EYES: EOM, PERRLA, conjunctiva pink and sclera white  ENT: No tonsillar erythema, exudates, or enlargement, moist mucous membranes, good dentition, no lesions  NECK: Supple, No JVD, normal thyroid, carotids with normal upstrokes and no bruits  CHEST/LUNG: Clear to auscultation bilaterally, No rales, rhonchi, wheezing, or rubs  HEART: Regular rate and rhythm, No murmurs, rubs, or gallops  ABDOMEN: Soft, nondistended, no masses, guarding, tenderness or rebound, bowel sounds present  EXTREMITIES:  swelling of right LE  right groins          LYMPH: No lymphadenopathy noted  SKIN: No rashes or lesions  NERVOUS SYSTEM:  Alert & Oriented X3, normal cognitive function. Motor Strength 5/5 right upper and right lower.  5/5 left upper and left lower extremities, DTRs 2+ intact and symmetric    LABS:        CBC Full  -  ( 06 Jul 2019 16:20 )  WBC Count : 9.1 K/uL  RBC Count : 2.74 M/uL  Hemoglobin : 8.5 g/dL  Hematocrit : 25.5 %  Platelet Count - Automated : 384 K/uL  Mean Cell Volume : 93.0 fl  Mean Cell Hemoglobin : 31.1 pg  Mean Cell Hemoglobin Concentration : 33.4 gm/dL  Auto Neutrophil # : x  Auto Lymphocyte # : x  Auto Monocyte # : x  Auto Eosinophil # : x  Auto Basophil # : x  Auto Neutrophil % : x  Auto Lymphocyte % : x  Auto Monocyte % : x  Auto Eosinophil % : x  Auto Basophil % : x    07-06    143  |  111<H>  |  58<H>  ----------------------------<  104<H>  4.3   |  21<L>  |  1.40<H>    Ca    9.0      06 Jul 2019 06:48  Phos  4.1     07-05  Mg     2.1     07-05    TPro  5.4<L>  /  Alb  2.9<L>  /  TBili  0.3  /  DBili  <0.1  /  AST  12  /  ALT  6<L>  /  AlkPhos  104  07-05    LIVER FUNCTIONS - ( 05 Jul 2019 06:48 )  Alb: 2.9 g/dL / Pro: 5.4 g/dL / ALK PHOS: 104 U/L / ALT: 6 U/L / AST: 12 U/L / GGT: x           PT/INR - ( 06 Jul 2019 14:51 )   PT: 22.6 sec;   INR: 1.93 ratio         PTT - ( 06 Jul 2019 14:51 )  PTT:35.5 sec    CAPILLARY BLOOD GLUCOSE          RADIOLOGY & ADDITIONAL TESTS:

## 2019-07-06 NOTE — PROGRESS NOTE ADULT - SUBJECTIVE AND OBJECTIVE BOX
BRAD NUÑEZ:[id number],   89yFemale followed for:  No Known Drug Allergies  Peaches (Rash)    PAST MEDICAL & SURGICAL HISTORY:  Pleural effusion on left  Splenic abscess  Polio  High cholesterol  Hypertension  History of appendectomy    FAMILY HISTORY:  No pertinent family history in first degree relatives    MEDICATIONS  (STANDING):  acetaminophen   Tablet .. 975 milliGRAM(s) Oral every 8 hours  amLODIPine   Tablet 5 milliGRAM(s) Oral daily  aspirin 325 milliGRAM(s) Oral daily  docusate sodium 100 milliGRAM(s) Oral three times a day  lidocaine   Patch 1 Patch Transdermal every 24 hours  losartan 100 milliGRAM(s) Oral daily  oxybutynin 5 milliGRAM(s) Oral daily  pantoprazole    Tablet 40 milliGRAM(s) Oral two times a day  polyethylene glycol 3350 17 Gram(s) Oral daily  senna 2 Tablet(s) Oral at bedtime  simvastatin 20 milliGRAM(s) Oral at bedtime    MEDICATIONS  (PRN):  bisacodyl Suppository 10 milliGRAM(s) Rectal daily PRN If no bowel movement  magnesium hydroxide Suspension 30 milliLiter(s) Oral daily PRN Constipation  ondansetron Injectable 4 milliGRAM(s) IV Push every 6 hours PRN Nausea and/or Vomiting      Vital Signs Last 24 Hrs  T(C): 36.7 (06 Jul 2019 11:17), Max: 36.8 (05 Jul 2019 21:32)  T(F): 98 (06 Jul 2019 11:17), Max: 98.2 (05 Jul 2019 21:32)  HR: 68 (06 Jul 2019 12:06) (67 - 72)  BP: 113/52 (06 Jul 2019 12:06) (109/62 - 138/68)  BP(mean): --  RR: 18 (06 Jul 2019 11:17) (18 - 18)  SpO2: 96% (06 Jul 2019 12:06) (96% - 98%)  nc/at  s1s2  cta  soft, nt, nd no guarding or rebound  no c/c/e    CBC Full  -  ( 06 Jul 2019 06:48 )  WBC Count : 8.6 K/uL  RBC Count : 2.45 M/uL  Hemoglobin : 7.9 g/dL  Hematocrit : 22.7 %  Platelet Count - Automated : 331 K/uL  Mean Cell Volume : 92.8 fl  Mean Cell Hemoglobin : 32.4 pg  Mean Cell Hemoglobin Concentration : 34.9 gm/dL  Auto Neutrophil # : x  Auto Lymphocyte # : x  Auto Monocyte # : x  Auto Eosinophil # : x  Auto Basophil # : x  Auto Neutrophil % : x  Auto Lymphocyte % : x  Auto Monocyte % : x  Auto Eosinophil % : x  Auto Basophil % : x    07-06    143  |  111<H>  |  58<H>  ----------------------------<  104<H>  4.3   |  21<L>  |  1.40<H>    Ca    9.0      06 Jul 2019 06:48  Phos  4.1     07-05  Mg     2.1     07-05    TPro  5.4<L>  /  Alb  2.9<L>  /  TBili  0.3  /  DBili  <0.1  /  AST  12  /  ALT  6<L>  /  AlkPhos  104  07-05

## 2019-07-07 LAB
ANION GAP SERPL CALC-SCNC: 14 MMOL/L — SIGNIFICANT CHANGE UP (ref 5–17)
APTT BLD: 59.9 SEC — HIGH (ref 27.5–36.3)
APTT BLD: 62.9 SEC — HIGH (ref 27.5–36.3)
APTT BLD: 83.1 SEC — HIGH (ref 27.5–36.3)
BUN SERPL-MCNC: 56 MG/DL — HIGH (ref 7–23)
CALCIUM SERPL-MCNC: 8.8 MG/DL — SIGNIFICANT CHANGE UP (ref 8.4–10.5)
CHLORIDE SERPL-SCNC: 109 MMOL/L — HIGH (ref 96–108)
CO2 SERPL-SCNC: 20 MMOL/L — LOW (ref 22–31)
CREAT SERPL-MCNC: 1.5 MG/DL — HIGH (ref 0.5–1.3)
GLUCOSE SERPL-MCNC: 84 MG/DL — SIGNIFICANT CHANGE UP (ref 70–99)
HCT VFR BLD CALC: 21.9 % — LOW (ref 34.5–45)
HCT VFR BLD CALC: 23.2 % — LOW (ref 34.5–45)
HCT VFR BLD CALC: 23.5 % — LOW (ref 34.5–45)
HGB BLD-MCNC: 7.1 G/DL — LOW (ref 11.5–15.5)
HGB BLD-MCNC: 7.8 G/DL — LOW (ref 11.5–15.5)
HGB BLD-MCNC: 7.9 G/DL — LOW (ref 11.5–15.5)
MCHC RBC-ENTMCNC: 29.5 PG — SIGNIFICANT CHANGE UP (ref 27–34)
MCHC RBC-ENTMCNC: 30.2 GM/DL — LOW (ref 32–36)
MCHC RBC-ENTMCNC: 31.3 PG — SIGNIFICANT CHANGE UP (ref 27–34)
MCHC RBC-ENTMCNC: 32.5 PG — SIGNIFICANT CHANGE UP (ref 27–34)
MCHC RBC-ENTMCNC: 34 GM/DL — SIGNIFICANT CHANGE UP (ref 32–36)
MCHC RBC-ENTMCNC: 35.5 GM/DL — SIGNIFICANT CHANGE UP (ref 32–36)
MCV RBC AUTO: 91.5 FL — SIGNIFICANT CHANGE UP (ref 80–100)
MCV RBC AUTO: 92 FL — SIGNIFICANT CHANGE UP (ref 80–100)
MCV RBC AUTO: 97.5 FL — SIGNIFICANT CHANGE UP (ref 80–100)
PLATELET # BLD AUTO: 342 K/UL — SIGNIFICANT CHANGE UP (ref 150–400)
PLATELET # BLD AUTO: 345 K/UL — SIGNIFICANT CHANGE UP (ref 150–400)
PLATELET # BLD AUTO: 387 K/UL — SIGNIFICANT CHANGE UP (ref 150–400)
POTASSIUM SERPL-MCNC: 4.2 MMOL/L — SIGNIFICANT CHANGE UP (ref 3.5–5.3)
POTASSIUM SERPL-SCNC: 4.2 MMOL/L — SIGNIFICANT CHANGE UP (ref 3.5–5.3)
RBC # BLD: 2.39 M/UL — LOW (ref 3.8–5.2)
RBC # BLD: 2.41 M/UL — LOW (ref 3.8–5.2)
RBC # BLD: 2.52 M/UL — LOW (ref 3.8–5.2)
RBC # FLD: 14.6 % — HIGH (ref 10.3–14.5)
RBC # FLD: 14.8 % — HIGH (ref 10.3–14.5)
RBC # FLD: 16 % — HIGH (ref 10.3–14.5)
SODIUM SERPL-SCNC: 143 MMOL/L — SIGNIFICANT CHANGE UP (ref 135–145)
WBC # BLD: 6.81 K/UL — SIGNIFICANT CHANGE UP (ref 3.8–10.5)
WBC # BLD: 7.4 K/UL — SIGNIFICANT CHANGE UP (ref 3.8–10.5)
WBC # BLD: 7.8 K/UL — SIGNIFICANT CHANGE UP (ref 3.8–10.5)
WBC # FLD AUTO: 6.81 K/UL — SIGNIFICANT CHANGE UP (ref 3.8–10.5)
WBC # FLD AUTO: 7.4 K/UL — SIGNIFICANT CHANGE UP (ref 3.8–10.5)
WBC # FLD AUTO: 7.8 K/UL — SIGNIFICANT CHANGE UP (ref 3.8–10.5)

## 2019-07-07 RX ORDER — HEPARIN SODIUM 5000 [USP'U]/ML
900 INJECTION INTRAVENOUS; SUBCUTANEOUS
Qty: 25000 | Refills: 0 | Status: DISCONTINUED | OUTPATIENT
Start: 2019-07-07 | End: 2019-07-08

## 2019-07-07 RX ORDER — ZINC OXIDE 200 MG/G
1 OINTMENT TOPICAL
Refills: 0 | Status: DISCONTINUED | OUTPATIENT
Start: 2019-07-07 | End: 2019-07-13

## 2019-07-07 RX ADMIN — Medication 975 MILLIGRAM(S): at 21:51

## 2019-07-07 RX ADMIN — SIMVASTATIN 20 MILLIGRAM(S): 20 TABLET, FILM COATED ORAL at 21:21

## 2019-07-07 RX ADMIN — PANTOPRAZOLE SODIUM 40 MILLIGRAM(S): 20 TABLET, DELAYED RELEASE ORAL at 05:13

## 2019-07-07 RX ADMIN — ZINC OXIDE 1 APPLICATION(S): 200 OINTMENT TOPICAL at 21:27

## 2019-07-07 RX ADMIN — SENNA PLUS 2 TABLET(S): 8.6 TABLET ORAL at 21:22

## 2019-07-07 RX ADMIN — Medication 975 MILLIGRAM(S): at 18:50

## 2019-07-07 RX ADMIN — HEPARIN SODIUM 10 UNIT(S)/HR: 5000 INJECTION INTRAVENOUS; SUBCUTANEOUS at 02:58

## 2019-07-07 RX ADMIN — LIDOCAINE 1 PATCH: 4 CREAM TOPICAL at 13:17

## 2019-07-07 RX ADMIN — PANTOPRAZOLE SODIUM 40 MILLIGRAM(S): 20 TABLET, DELAYED RELEASE ORAL at 18:00

## 2019-07-07 RX ADMIN — HEPARIN SODIUM 9 UNIT(S)/HR: 5000 INJECTION INTRAVENOUS; SUBCUTANEOUS at 17:37

## 2019-07-07 RX ADMIN — HEPARIN SODIUM 9 UNIT(S)/HR: 5000 INJECTION INTRAVENOUS; SUBCUTANEOUS at 09:50

## 2019-07-07 RX ADMIN — Medication 975 MILLIGRAM(S): at 05:14

## 2019-07-07 RX ADMIN — Medication 325 MILLIGRAM(S): at 13:16

## 2019-07-07 RX ADMIN — Medication 100 MILLIGRAM(S): at 05:13

## 2019-07-07 RX ADMIN — Medication 5 MILLIGRAM(S): at 13:16

## 2019-07-07 RX ADMIN — Medication 975 MILLIGRAM(S): at 13:17

## 2019-07-07 RX ADMIN — Medication 100 MILLIGRAM(S): at 13:16

## 2019-07-07 RX ADMIN — Medication 975 MILLIGRAM(S): at 21:21

## 2019-07-07 RX ADMIN — Medication 100 MILLIGRAM(S): at 21:22

## 2019-07-07 NOTE — PROGRESS NOTE ADULT - SUBJECTIVE AND OBJECTIVE BOX
BRAD NUÑEZ:[id number],   89yFemale followed for:  No Known Drug Allergies  Peaches (Rash)    PAST MEDICAL & SURGICAL HISTORY:  Pleural effusion on left  Splenic abscess  Polio  High cholesterol  Hypertension  History of appendectomy    FAMILY HISTORY:  No pertinent family history in first degree relatives    MEDICATIONS  (STANDING):  acetaminophen   Tablet .. 975 milliGRAM(s) Oral every 8 hours  amLODIPine   Tablet 5 milliGRAM(s) Oral daily  aspirin 325 milliGRAM(s) Oral daily  docusate sodium 100 milliGRAM(s) Oral three times a day  heparin  Infusion 900 Unit(s)/Hr (9 mL/Hr) IV Continuous <Continuous>  lidocaine   Patch 1 Patch Transdermal every 24 hours  losartan 100 milliGRAM(s) Oral daily  oxybutynin 5 milliGRAM(s) Oral daily  pantoprazole    Tablet 40 milliGRAM(s) Oral two times a day  polyethylene glycol 3350 17 Gram(s) Oral daily  senna 2 Tablet(s) Oral at bedtime  simvastatin 20 milliGRAM(s) Oral at bedtime    MEDICATIONS  (PRN):  bisacodyl Suppository 10 milliGRAM(s) Rectal daily PRN If no bowel movement  magnesium hydroxide Suspension 30 milliLiter(s) Oral daily PRN Constipation  ondansetron Injectable 4 milliGRAM(s) IV Push every 6 hours PRN Nausea and/or Vomiting      Vital Signs Last 24 Hrs  T(C): 36.6 (07 Jul 2019 04:15), Max: 36.7 (06 Jul 2019 11:17)  T(F): 97.9 (07 Jul 2019 04:15), Max: 98.1 (06 Jul 2019 20:53)  HR: 61 (07 Jul 2019 04:15) (61 - 72)  BP: 103/49 (07 Jul 2019 04:15) (103/49 - 119/66)  BP(mean): --  RR: 17 (07 Jul 2019 04:15) (17 - 18)  SpO2: 97% (07 Jul 2019 04:15) (96% - 98%)  nc/at  s1s2  cta  soft, nt, nd no guarding or rebound  no c/c/e    CBC Full  -  ( 07 Jul 2019 09:07 )  WBC Count : 6.81 K/uL  RBC Count : 2.41 M/uL  Hemoglobin : 7.1 g/dL  Hematocrit : 23.5 %  Platelet Count - Automated : 342 K/uL  Mean Cell Volume : 97.5 fl  Mean Cell Hemoglobin : 29.5 pg  Mean Cell Hemoglobin Concentration : 30.2 gm/dL  Auto Neutrophil # : x  Auto Lymphocyte # : x  Auto Monocyte # : x  Auto Eosinophil # : x  Auto Basophil # : x  Auto Neutrophil % : x  Auto Lymphocyte % : x  Auto Monocyte % : x  Auto Eosinophil % : x  Auto Basophil % : x    07-07    143  |  109<H>  |  56<H>  ----------------------------<  84  4.2   |  20<L>  |  1.50<H>    Ca    8.8      07 Jul 2019 06:06      PT/INR - ( 06 Jul 2019 14:51 )   PT: 22.6 sec;   INR: 1.93 ratio         PTT - ( 07 Jul 2019 09:01 )  PTT:83.1 sec

## 2019-07-07 NOTE — PROGRESS NOTE ADULT - SUBJECTIVE AND OBJECTIVE BOX
Patient is a 89y old  Female who presents with a chief complaint of hip fracture.  s/p mech fall in the kitchen onto R hip when right leg gave way, now c/o severe right hip pain and inability to ambulate.  Patient denies headstrike or LOC. Localizes pain to right hip/femur/knee. Patient denies radiation of pain. Patient denies numbness/tingling/burning in the RLE. No other bone/joint complaints. Patient is a community ambulator at baseline with a rolling walker. Patient has no issues w/ ADLs/IADLs. The patient underwent a right hemiarthroplasty ORIF surgery on 6/22/2019. The patient is now postop, feeling well and complaining of moderate right hip incisional pain. When last seen on the morning of 6/23/19, she is wanted to be out of bed to chair and begin ambulation. She had an uneventful day until 6:30 PM when she noted weakness of her left hand and altered ability to move her fingers.  Her daughter noticed facial weakness and slurring of words.  No staff was notified and the patient remained this way until next examined by physical therapy on the morning of 6/24/19.  She was found to have  significant left hemiparesis of the left arm greater than left leg, with facial droop and difficulty articulating.  The patient was alert and oriented x3 and she answered questions appropriately.  She had clear weakness in the left arm and left leg as compared to the day before.  EKG was obtained and she was in normal sinus rhythm,  with no ST-T wave changes. Blood pressure was low between 100- 120 systolic.  Blood pressure medications were held, unless blood pressure was greater than 130 systolic.  The patient had a neurological consultation and  right brain CVA was suspected.  The patient underwent CT of the brain at this time showed no hemorrhages or masses.  There was no hydrocephalus.  Possibility of infarction was present and an MRI was advised.  After the CT scan of the brain the patient received 325 mg of aspirin.  It was now approximately 16 hours after the onset of left hemiparesis.  The patient was transferred to a monitored bed to rule out  episodic paroxysmal atrial fibrillation that could have caused her  acute CVA.  The patient had previously been on 2.5 mg Eliquis twice a day and dosage wasnow increased to 5 mg twice a day for therapeutic anticoagulation.  Her rhythm has remained  NSR with a pulse of 64.  The patient has no pain and with the exception of left-sided weakness, has no changes in her condition.  She complains bitterly of right leg postoperative pain  The patient has no new medical complaints at this time. 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  The patient and daughter were fully  informed of right carotid critical stenosis. Telemetry continues with NSR  and no arrhythmias. Patient seen resting comfortably. speech is minimally slurred and Patient is moving all extremities. Patient found to have worsening anemia.       MEDICATIONS  (STANDING):  acetaminophen   Tablet .. 975 milliGRAM(s) Oral every 8 hours  amLODIPine   Tablet 5 milliGRAM(s) Oral daily  aspirin 325 milliGRAM(s) Oral daily  docusate sodium 100 milliGRAM(s) Oral three times a day  heparin  Infusion 900 Unit(s)/Hr (9 mL/Hr) IV Continuous <Continuous>  lidocaine   Patch 1 Patch Transdermal every 24 hours  losartan 100 milliGRAM(s) Oral daily  oxybutynin 5 milliGRAM(s) Oral daily  pantoprazole    Tablet 40 milliGRAM(s) Oral two times a day  polyethylene glycol 3350 17 Gram(s) Oral daily  senna 2 Tablet(s) Oral at bedtime  simvastatin 20 milliGRAM(s) Oral at bedtime  zinc oxide 20% Ointment 1 Application(s) Topical two times a day    MEDICATIONS  (PRN):  bisacodyl Suppository 10 milliGRAM(s) Rectal daily PRN If no bowel movement  magnesium hydroxide Suspension 30 milliLiter(s) Oral daily PRN Constipation  ondansetron Injectable 4 milliGRAM(s) IV Push every 6 hours PRN Nausea and/or Vomiting          VITALS:   T(C): 36.7 (07-07-19 @ 11:40), Max: 36.7 (07-06-19 @ 20:53)  HR: 66 (07-07-19 @ 11:40) (61 - 72)  BP: 108/57 (07-07-19 @ 11:40) (103/49 - 118/55)  RR: 17 (07-07-19 @ 11:40) (17 - 18)  SpO2: 100% (07-07-19 @ 11:40) (97% - 100%)  Wt(kg): --    PHYSICAL EXAM:  GENERAL: NAD, well nourished and conversant  HEAD:  Atraumatic  EYES: EOM, PERRLA, conjunctiva pink and sclera white  ENT: No tonsillar erythema, exudates, or enlargement, moist mucous membranes, good dentition, no lesions  NECK: Supple, No JVD, normal thyroid, carotids with normal upstrokes and no bruits  CHEST/LUNG: Clear to auscultation bilaterally, No rales, rhonchi, wheezing, or rubs  HEART: Regular rate and rhythm, No murmurs, rubs, or gallops  ABDOMEN: Soft, nondistended, no masses, guarding, tenderness or rebound, bowel sounds present  EXTREMITIES:  swelling of right LE  right groins          LYMPH: No lymphadenopathy noted  SKIN: No rashes or lesions  NERVOUS SYSTEM:  Alert & Oriented X3, normal cognitive function. Motor Strength 5/5 right upper and right lower.  5/5 left upper and left lower extremities, DTRs 2+ intact and symmetric    LABS:        CBC Full  -  ( 07 Jul 2019 17:14 )  WBC Count : 7.8 K/uL  RBC Count : 2.52 M/uL  Hemoglobin : 7.9 g/dL  Hematocrit : 23.2 %  Platelet Count - Automated : 387 K/uL  Mean Cell Volume : 92.0 fl  Mean Cell Hemoglobin : 31.3 pg  Mean Cell Hemoglobin Concentration : 34.0 gm/dL  Auto Neutrophil # : x  Auto Lymphocyte # : x  Auto Monocyte # : x  Auto Eosinophil # : x  Auto Basophil # : x  Auto Neutrophil % : x  Auto Lymphocyte % : x  Auto Monocyte % : x  Auto Eosinophil % : x  Auto Basophil % : x    07-07    143  |  109<H>  |  56<H>  ----------------------------<  84  4.2   |  20<L>  |  1.50<H>    Ca    8.8      07 Jul 2019 06:06        PT/INR - ( 06 Jul 2019 14:51 )   PT: 22.6 sec;   INR: 1.93 ratio         PTT - ( 07 Jul 2019 17:14 )  PTT:59.9 sec    CAPILLARY BLOOD GLUCOSE          RADIOLOGY & ADDITIONAL TESTS:

## 2019-07-07 NOTE — PROVIDER CONTACT NOTE (OTHER) - SITUATION
pt HR down to 44 first time on tele, HR non sustained, HR on tele 50-60 baseline, now currently 60-70

## 2019-07-07 NOTE — PROGRESS NOTE ADULT - ASSESSMENT
h/h down slightly.  bradycardia.  defer to medicine.  egd poss jhony or tuesday based on schedule.  xa takes 3 days to decrease effect in elderly.

## 2019-07-08 LAB
ANION GAP SERPL CALC-SCNC: 13 MMOL/L — SIGNIFICANT CHANGE UP (ref 5–17)
APTT BLD: 55.3 SEC — HIGH (ref 27.5–36.3)
APTT BLD: 62.4 SEC — HIGH (ref 27.5–36.3)
BLD GP AB SCN SERPL QL: NEGATIVE — SIGNIFICANT CHANGE UP
BUN SERPL-MCNC: 52 MG/DL — HIGH (ref 7–23)
CALCIUM SERPL-MCNC: 9.3 MG/DL — SIGNIFICANT CHANGE UP (ref 8.4–10.5)
CHLORIDE SERPL-SCNC: 111 MMOL/L — HIGH (ref 96–108)
CO2 SERPL-SCNC: 20 MMOL/L — LOW (ref 22–31)
CREAT SERPL-MCNC: 1.57 MG/DL — HIGH (ref 0.5–1.3)
GLUCOSE SERPL-MCNC: 106 MG/DL — HIGH (ref 70–99)
HCT VFR BLD CALC: 21 % — CRITICAL LOW (ref 34.5–45)
HCT VFR BLD CALC: 21.8 % — LOW (ref 34.5–45)
HGB BLD-MCNC: 7.4 G/DL — LOW (ref 11.5–15.5)
HGB BLD-MCNC: 7.5 G/DL — LOW (ref 11.5–15.5)
MCHC RBC-ENTMCNC: 31.6 PG — SIGNIFICANT CHANGE UP (ref 27–34)
MCHC RBC-ENTMCNC: 32.6 PG — SIGNIFICANT CHANGE UP (ref 27–34)
MCHC RBC-ENTMCNC: 34.5 GM/DL — SIGNIFICANT CHANGE UP (ref 32–36)
MCHC RBC-ENTMCNC: 35.4 GM/DL — SIGNIFICANT CHANGE UP (ref 32–36)
MCV RBC AUTO: 91.7 FL — SIGNIFICANT CHANGE UP (ref 80–100)
MCV RBC AUTO: 92.1 FL — SIGNIFICANT CHANGE UP (ref 80–100)
PLATELET # BLD AUTO: 350 K/UL — SIGNIFICANT CHANGE UP (ref 150–400)
PLATELET # BLD AUTO: 382 K/UL — SIGNIFICANT CHANGE UP (ref 150–400)
POTASSIUM SERPL-MCNC: 4.2 MMOL/L — SIGNIFICANT CHANGE UP (ref 3.5–5.3)
POTASSIUM SERPL-SCNC: 4.2 MMOL/L — SIGNIFICANT CHANGE UP (ref 3.5–5.3)
RBC # BLD: 2.28 M/UL — LOW (ref 3.8–5.2)
RBC # BLD: 2.38 M/UL — LOW (ref 3.8–5.2)
RBC # FLD: 14.5 % — SIGNIFICANT CHANGE UP (ref 10.3–14.5)
RBC # FLD: 14.5 % — SIGNIFICANT CHANGE UP (ref 10.3–14.5)
RH IG SCN BLD-IMP: POSITIVE — SIGNIFICANT CHANGE UP
SODIUM SERPL-SCNC: 144 MMOL/L — SIGNIFICANT CHANGE UP (ref 135–145)
SURGICAL PATHOLOGY STUDY: SIGNIFICANT CHANGE UP
WBC # BLD: 7.1 K/UL — SIGNIFICANT CHANGE UP (ref 3.8–10.5)
WBC # BLD: 8.3 K/UL — SIGNIFICANT CHANGE UP (ref 3.8–10.5)
WBC # FLD AUTO: 7.1 K/UL — SIGNIFICANT CHANGE UP (ref 3.8–10.5)
WBC # FLD AUTO: 8.3 K/UL — SIGNIFICANT CHANGE UP (ref 3.8–10.5)

## 2019-07-08 RX ORDER — HEPARIN SODIUM 5000 [USP'U]/ML
950 INJECTION INTRAVENOUS; SUBCUTANEOUS
Qty: 25000 | Refills: 0 | Status: DISCONTINUED | OUTPATIENT
Start: 2019-07-08 | End: 2019-07-08

## 2019-07-08 RX ADMIN — HEPARIN SODIUM 9.5 UNIT(S)/HR: 5000 INJECTION INTRAVENOUS; SUBCUTANEOUS at 01:34

## 2019-07-08 RX ADMIN — ZINC OXIDE 1 APPLICATION(S): 200 OINTMENT TOPICAL at 18:29

## 2019-07-08 RX ADMIN — Medication 975 MILLIGRAM(S): at 06:10

## 2019-07-08 RX ADMIN — HEPARIN SODIUM 9.5 UNIT(S)/HR: 5000 INJECTION INTRAVENOUS; SUBCUTANEOUS at 06:59

## 2019-07-08 RX ADMIN — Medication 5 MILLIGRAM(S): at 18:28

## 2019-07-08 RX ADMIN — PANTOPRAZOLE SODIUM 40 MILLIGRAM(S): 20 TABLET, DELAYED RELEASE ORAL at 06:10

## 2019-07-08 RX ADMIN — SIMVASTATIN 20 MILLIGRAM(S): 20 TABLET, FILM COATED ORAL at 21:28

## 2019-07-08 RX ADMIN — PANTOPRAZOLE SODIUM 40 MILLIGRAM(S): 20 TABLET, DELAYED RELEASE ORAL at 18:27

## 2019-07-08 RX ADMIN — LIDOCAINE 1 PATCH: 4 CREAM TOPICAL at 23:49

## 2019-07-08 RX ADMIN — Medication 100 MILLIGRAM(S): at 06:10

## 2019-07-08 RX ADMIN — Medication 100 MILLIGRAM(S): at 21:28

## 2019-07-08 RX ADMIN — ZINC OXIDE 1 APPLICATION(S): 200 OINTMENT TOPICAL at 06:14

## 2019-07-08 RX ADMIN — LIDOCAINE 1 PATCH: 4 CREAM TOPICAL at 19:39

## 2019-07-08 RX ADMIN — SENNA PLUS 2 TABLET(S): 8.6 TABLET ORAL at 21:28

## 2019-07-08 RX ADMIN — LIDOCAINE 1 PATCH: 4 CREAM TOPICAL at 12:47

## 2019-07-08 RX ADMIN — Medication 325 MILLIGRAM(S): at 18:28

## 2019-07-08 NOTE — PROVIDER CONTACT NOTE (CRITICAL VALUE NOTIFICATION) - ACTION/TREATMENT ORDERED:
PA notified.  No new order at this time.
1 Unit PRBC given, will continue to monitor
pt to get 2 unit PRBC transfusion,will continue to monitor pt closely.

## 2019-07-08 NOTE — CHART NOTE - NSCHARTNOTEFT_GEN_A_CORE
H/H 7.4/21.0  - Pt planned for endoscopy today at 2PM. Will transfuse 1 unit PRBC per request by GI (endoscopy) and per discussion with Dr. Damian    [id number]

## 2019-07-08 NOTE — PROVIDER CONTACT NOTE (CRITICAL VALUE NOTIFICATION) - NAME OF MD/NP/PA/DO NOTIFIED:
"Jian Shane is being seen in the pediatric endocrinology clinic today at the request of Len for evaluation of Weight Gain  .    HPI: Jian is a 16  y.o. 7  m.o. male presenting with weight gain and concerns for diabetes risk. Mom reports he has "always been overweight." mom reports pcp was concerned about diabetes and he had elevated A1C. Mom reports she herself has made diet changes and has lost weight, however his dad continues to bring soft drinks into the house and his older sister brings fast food home late at night a few times a week. He denies associated symptoms of diabetes such as polyuria, polydipsia and nocturia.     Exercise: walks or rides bike on weekends, aerobics at school  Screen: 4hrs/day  Drinks: water, regular soda, no juice, lowfat milk  Dining Out- 3 times/wk- fast food late at night      ROS:  Constitutional: Negative for fever.   HENT: Negative for congestion and sore throat.    Eyes: Negative for discharge and redness.   Respiratory: Negative for cough and shortness of breath.    Cardiovascular: Negative for chest pain.   Gastrointestinal: Negative for nausea and vomiting.   Musculoskeletal: Negative for myalgias.   Skin: Negative for rash.   Neurological: Negative for headaches.   Psychiatric/Behavioral: Negative for behavioral problems.   Endocrine: see HPI and negative for - nocturia, polyuria, polydipsia    Past Medical/Surgical/Family History:  No birth history on file.    Past Medical History   Diagnosis Date    ADHD (attention deficit hyperactivity disorder)     Autism        Family History   Problem Relation Age of Onset    Hypertension Mother     Diabetes Father     Post-traumatic stress disorder Sister     Seizures Sister     Diabetes Maternal Grandmother     Hypertension Maternal Grandmother          History reviewed. No pertinent past surgical history.    Social History:  Social History     Social History Narrative    Lives with parents. Goes to [employer] "
"      Medications:  Current Outpatient Prescriptions   Medication Sig    guanfacine (TENEX) 2 MG tablet TK 1  T PO  AT BEDTIME    VYVANSE 50 mg capsule TK 1 C PO QAM     No current facility-administered medications for this visit.        Allergies:  Review of patient's allergies indicates:  No Known Allergies    Physical Exam:   Visit Vitals    Ht 5' 11.06" (1.805 m)    Wt 114.2 kg (251 lb 12.3 oz)    BMI 35.05 kg/m2     body surface area is 2.39 meters squared.    General: alert, active, in no acute distress  Skin: normal tone and texture, no rashes  Head:  atraumatic and normocephalic  Eyes:  Conjunctivae are normal, pupils equal and reactive to light, extraocular movements intact  Throat:  moist mucous membranes without erythema, exudates or petechiae  Neck:  supple, no lymphadenopathy, no thyromegaly, +hyperpigmentation on back of neck.   Lungs: Effort normal and breath sounds normal.   Heart:  regular rate and rhythm, no edema  Abdomen:  Abdomen soft, non-tender. No masses or hepatosplenomegaly   Neuro: gross motor exam normal by observation, DTR at patella 2+  Musculoskeletal:  Normal range of motion, gait normal      Labs:  A1C-6.1%     Impression/Recommendations:    Jian is a 16 y.o. male being seen as a new patient today by pediatric endocrinology for weight gain and elevated A1C. He is in pre-diabetes range at this time. We discussed importance of stopping progression to diabetes. His repeat BP was normal.     The history and physical exam are not suggestive of secondary causes of obesity such as hypercortisolism. His thyroid function tests were normal.     -Discussed potential for co-morbidities of obesity (DM, hypertension, heart disease) at length with mother  -Discussed the possibility of prevention/reversal of these complications with improvement in lifestyle  -Discussed healthy lifestyle changes: making better food choices, portion control, increasing activity time and intensity         "
"-Advised decreasing consumption of sugary beverages (juice, teas, soda) and to drink more water and only nonfat milk         -Choose healthy snacks (fruits, vegetables)         -Cut back on "eating out"         -Try to eat breakfast daily         -Increase time spent in active play or exercising (at least 1/2 - 1 hour per day)    -Referral to Nutrition for assistance in dietary changes      It was a pleasure to see your patient in clinic today. Please call with any questions or concerns.    Kasey Choi NP  "
Jeanine, HENRY Wei
NP Rachael padilla
reji)

## 2019-07-08 NOTE — PROVIDER CONTACT NOTE (CRITICAL VALUE NOTIFICATION) - ASSESSMENT
Patient is asymptomatic. No signs of bleeding.  On Heparin drip  at 9.5cc/hr
patient asymptomatic, VSS
pt aysmtomatic

## 2019-07-08 NOTE — PROVIDER CONTACT NOTE (CRITICAL VALUE NOTIFICATION) - BACKGROUND
Admitted for s/p Fall Right hip fracture s/p Right Hemiarthroplasty on 6/22/19
hip fracture
pt adm with hip fracture,hx polio,htn,cva

## 2019-07-08 NOTE — CHART NOTE - NSCHARTNOTEFT_GEN_A_CORE
S/P EGD and with argon beam coagulation of two bleeding angioectasias in the duodenum. Resumed regular diet per discussion with Dr. Brown and to resume heparin drip in am. Will monitor am CBC    [id number]

## 2019-07-08 NOTE — PROGRESS NOTE ADULT - SUBJECTIVE AND OBJECTIVE BOX
BRAD NUÑEZ:[id number],   89yFemale followed for:  No Known Drug Allergies  Peaches (Rash)    PAST MEDICAL & SURGICAL HISTORY:  Pleural effusion on left  Splenic abscess  Polio  High cholesterol  Hypertension  History of appendectomy    FAMILY HISTORY:  No pertinent family history in first degree relatives    MEDICATIONS  (STANDING):  acetaminophen   Tablet .. 975 milliGRAM(s) Oral every 8 hours  amLODIPine   Tablet 5 milliGRAM(s) Oral daily  aspirin 325 milliGRAM(s) Oral daily  docusate sodium 100 milliGRAM(s) Oral three times a day  heparin  Infusion 950 Unit(s)/Hr (9.5 mL/Hr) IV Continuous <Continuous>  lidocaine   Patch 1 Patch Transdermal every 24 hours  losartan 100 milliGRAM(s) Oral daily  oxybutynin 5 milliGRAM(s) Oral daily  pantoprazole    Tablet 40 milliGRAM(s) Oral two times a day  polyethylene glycol 3350 17 Gram(s) Oral daily  senna 2 Tablet(s) Oral at bedtime  simvastatin 20 milliGRAM(s) Oral at bedtime  zinc oxide 20% Ointment 1 Application(s) Topical two times a day    MEDICATIONS  (PRN):  bisacodyl Suppository 10 milliGRAM(s) Rectal daily PRN If no bowel movement  magnesium hydroxide Suspension 30 milliLiter(s) Oral daily PRN Constipation  ondansetron Injectable 4 milliGRAM(s) IV Push every 6 hours PRN Nausea and/or Vomiting      Vital Signs Last 24 Hrs  T(C): 36.8 (08 Jul 2019 04:10), Max: 36.9 (07 Jul 2019 21:14)  T(F): 98.2 (08 Jul 2019 04:10), Max: 98.5 (07 Jul 2019 21:14)  HR: 73 (08 Jul 2019 04:10) (66 - 80)  BP: 109/61 (08 Jul 2019 04:10) (108/57 - 117/64)  BP(mean): --  RR: 18 (08 Jul 2019 04:10) (17 - 18)  SpO2: 98% (08 Jul 2019 04:10) (98% - 100%)  nc/at  s1s2  cta  soft, nt, nd no guarding or rebound  no c/c/e    CBC Full  -  ( 08 Jul 2019 05:57 )  WBC Count : 7.1 K/uL  RBC Count : 2.28 M/uL  Hemoglobin : 7.4 g/dL  Hematocrit : 21.0 %  Platelet Count - Automated : 350 K/uL  Mean Cell Volume : 92.1 fl  Mean Cell Hemoglobin : 32.6 pg  Mean Cell Hemoglobin Concentration : 35.4 gm/dL  Auto Neutrophil # : x  Auto Lymphocyte # : x  Auto Monocyte # : x  Auto Eosinophil # : x  Auto Basophil # : x  Auto Neutrophil % : x  Auto Lymphocyte % : x  Auto Monocyte % : x  Auto Eosinophil % : x  Auto Basophil % : x    07-08    144  |  111<H>  |  52<H>  ----------------------------<  106<H>  4.2   |  20<L>  |  1.57<H>    Ca    9.3      08 Jul 2019 05:57      PT/INR - ( 06 Jul 2019 14:51 )   PT: 22.6 sec;   INR: 1.93 ratio         PTT - ( 08 Jul 2019 05:57 )  PTT:62.4 sec

## 2019-07-08 NOTE — CHART NOTE - NSCHARTNOTEFT_GEN_A_CORE
[id number]  BRAD NUÑEZ    Notified by RN patient with critical lab result of Hgb 7.4.   Patient seen and assessed at bedside, resting comfortably in bed. Patient without any acute complaints. Of note, patient is currently on a patient-specific heparin gtt for Afib. No signs/symptoms of bleeding noted.     Vital Signs Last 24 Hrs  T(C): 36.8 (08 Jul 2019 04:10), Max: 36.9 (07 Jul 2019 21:14)  T(F): 98.2 (08 Jul 2019 04:10), Max: 98.5 (07 Jul 2019 21:14)  HR: 73 (08 Jul 2019 04:10) (66 - 80)  BP: 109/61 (08 Jul 2019 04:10) (108/57 - 117/64)  RR: 18 (08 Jul 2019 04:10) (17 - 18)  SpO2: 98% (08 Jul 2019 04:10) (98% - 100%)                          7.4    7.1   )-----------( 350      ( 08 Jul 2019 05:57 )             21.0     07-08    144  |  111<H>  |  52<H>  ----------------------------<  106<H>  4.2   |  20<L>  |  1.57<H>    Ca    9.3      08 Jul 2019 05:57      Anemia Plan of Care/ Interventions:  -Continue to monitor and trend CBC  -Monitor for signs/symptoms of bleeding  -Maintain active T&S  -Continue to monitor on telemetry  -Will closely monitor patient and discuss with primary team this AM        Sanjuana Solorzano PA-C  Dept of Medicine  [id number]

## 2019-07-08 NOTE — PROGRESS NOTE ADULT - ASSESSMENT
89F hx of Open reduction and internal fixation of hip. Patient s/p cerebrovascular accident due to carotid stenosis. Found to have guaiac positive stool. Patient s/p EGD

## 2019-07-08 NOTE — PROGRESS NOTE ADULT - SUBJECTIVE AND OBJECTIVE BOX
Patient is a 89y old  Female who presents with a chief complaint of hip fracture.  s/p mech fall in the kitchen onto R hip when right leg gave way, now c/o severe right hip pain and inability to ambulate.  Patient denies headstrike or LOC. Localizes pain to right hip/femur/knee. Patient denies radiation of pain. Patient denies numbness/tingling/burning in the RLE. No other bone/joint complaints. Patient is a community ambulator at baseline with a rolling walker. Patient has no issues w/ ADLs/IADLs. The patient underwent a right hemiarthroplasty ORIF surgery on 6/22/2019. When last seen on the morning of 6/23/19, she is wanted to be out of bed to chair and begin ambulation. She had an uneventful day until 6:30 PM when she noted weakness of her left hand and altered ability to move her fingers.  Her daughter noticed facial weakness and slurring of words.  No staff was notified and the patient remained this way until next examined by physical therapy on the morning of 6/24/19.  She was found to have  significant left hemiparesis of the left arm greater than left leg, with facial droop and difficulty articulating.  The patient was alert and oriented x3 and she answered questions appropriately.  She had clear weakness in the left arm and left leg as compared to the day before.  EKG was obtained and she was in normal sinus rhythm,  with no ST-T wave changes. Blood pressure was low between 100- 120 systolic.  Blood pressure medications were held, unless blood pressure was greater than 130 systolic.  The patient had a neurological consultation and  right brain CVA was suspected.  The patient underwent CT of the brain at this time showed no hemorrhages or masses.  There was no hydrocephalus.  Possibility of infarction was present and an MRI was advised.  After the CT scan of the brain the patient received 325 mg of aspirin.  It was now approximately 16 hours after the onset of left hemiparesis.  The patient was transferred to a monitored bed to rule out  episodic paroxysmal atrial fibrillation that could have caused her  acute CVA.  The patient had previously been on 2.5 mg Eliquis twice a day and dosage wasnow increased to 5 mg twice a day for therapeutic anticoagulation.  Her rhythm has remained  NSR with a pulse of 64.  The patient has no pain and with the exception of left-sided weakness, has no changes in her condition.  She complains bitterly of right leg postoperative pain  The patient has no new medical complaints at this time. 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  The patient and daughter were fully  informed of right carotid critical stenosis. Telemetry continues with NSR  and no arrhythmias. Patient seen resting comfortably. speech is minimally slurred and Patient is moving all extremities. Patient found to have worsening anemia. Patient s/p EGD.    MEDICATIONS  (STANDING):  acetaminophen   Tablet .. 975 milliGRAM(s) Oral every 8 hours  amLODIPine   Tablet 5 milliGRAM(s) Oral daily  aspirin 325 milliGRAM(s) Oral daily  docusate sodium 100 milliGRAM(s) Oral three times a day  heparin  Infusion 950 Unit(s)/Hr (9.5 mL/Hr) IV Continuous <Continuous>  lidocaine   Patch 1 Patch Transdermal every 24 hours  losartan 100 milliGRAM(s) Oral daily  oxybutynin 5 milliGRAM(s) Oral daily  pantoprazole    Tablet 40 milliGRAM(s) Oral two times a day  polyethylene glycol 3350 17 Gram(s) Oral daily  senna 2 Tablet(s) Oral at bedtime  simvastatin 20 milliGRAM(s) Oral at bedtime  zinc oxide 20% Ointment 1 Application(s) Topical two times a day    MEDICATIONS  (PRN):  bisacodyl Suppository 10 milliGRAM(s) Rectal daily PRN If no bowel movement  magnesium hydroxide Suspension 30 milliLiter(s) Oral daily PRN Constipation  ondansetron Injectable 4 milliGRAM(s) IV Push every 6 hours PRN Nausea and/or Vomiting          VITALS:   T(C): 36.8 (07-08-19 @ 21:20), Max: 36.8 (07-08-19 @ 04:10)  HR: 75 (07-08-19 @ 21:20) (72 - 77)  BP: 125/65 (07-08-19 @ 21:20) (109/61 - 143/56)  RR: 18 (07-08-19 @ 21:20) (17 - 18)  SpO2: 96% (07-08-19 @ 21:20) (96% - 100%)  Wt(kg): --    PHYSICAL EXAM:  GENERAL: NAD, well nourished and conversant  HEAD:  Atraumatic  EYES: EOM, PERRLA, conjunctiva pink and sclera white  ENT: No tonsillar erythema, exudates, or enlargement, moist mucous membranes, good dentition, no lesions  NECK: Supple, No JVD, normal thyroid, carotids with normal upstrokes and no bruits  CHEST/LUNG: Clear to auscultation bilaterally, No rales, rhonchi, wheezing, or rubs  HEART: Regular rate and rhythm, No murmurs, rubs, or gallops  ABDOMEN: Soft, nondistended, no masses, guarding, tenderness or rebound, bowel sounds present  EXTREMITIES:  swelling of right LE  right groins          LYMPH: No lymphadenopathy noted  SKIN: No rashes or lesions  NERVOUS SYSTEM:  Alert & Oriented X3, normal cognitive function. Motor Strength 5/5 right upper and right lower.  5/5 left upper and left lower extremities, DTRs 2+ intact and symmetric    LABS:        CBC Full  -  ( 08 Jul 2019 05:57 )  WBC Count : 7.1 K/uL  RBC Count : 2.28 M/uL  Hemoglobin : 7.4 g/dL  Hematocrit : 21.0 %  Platelet Count - Automated : 350 K/uL  Mean Cell Volume : 92.1 fl  Mean Cell Hemoglobin : 32.6 pg  Mean Cell Hemoglobin Concentration : 35.4 gm/dL  Auto Neutrophil # : x  Auto Lymphocyte # : x  Auto Monocyte # : x  Auto Eosinophil # : x  Auto Basophil # : x  Auto Neutrophil % : x  Auto Lymphocyte % : x  Auto Monocyte % : x  Auto Eosinophil % : x  Auto Basophil % : x    07-08    144  |  111<H>  |  52<H>  ----------------------------<  106<H>  4.2   |  20<L>  |  1.57<H>    Ca    9.3      08 Jul 2019 05:57        PTT - ( 08 Jul 2019 05:57 )  PTT:62.4 sec    CAPILLARY BLOOD GLUCOSE          RADIOLOGY & ADDITIONAL TESTS:

## 2019-07-09 LAB
ANION GAP SERPL CALC-SCNC: 10 MMOL/L — SIGNIFICANT CHANGE UP (ref 5–17)
ANION GAP SERPL CALC-SCNC: 9 MMOL/L — SIGNIFICANT CHANGE UP (ref 5–17)
APTT BLD: 33.4 SEC — SIGNIFICANT CHANGE UP (ref 27.5–36.3)
APTT BLD: 56.6 SEC — HIGH (ref 27.5–36.3)
BUN SERPL-MCNC: 41 MG/DL — HIGH (ref 7–23)
BUN SERPL-MCNC: 45 MG/DL — HIGH (ref 7–23)
CALCIUM SERPL-MCNC: 8.9 MG/DL — SIGNIFICANT CHANGE UP (ref 8.4–10.5)
CALCIUM SERPL-MCNC: 9.7 MG/DL — SIGNIFICANT CHANGE UP (ref 8.4–10.5)
CHLORIDE SERPL-SCNC: 111 MMOL/L — HIGH (ref 96–108)
CHLORIDE SERPL-SCNC: 111 MMOL/L — HIGH (ref 96–108)
CO2 SERPL-SCNC: 21 MMOL/L — LOW (ref 22–31)
CO2 SERPL-SCNC: 22 MMOL/L — SIGNIFICANT CHANGE UP (ref 22–31)
CREAT SERPL-MCNC: 1.47 MG/DL — HIGH (ref 0.5–1.3)
CREAT SERPL-MCNC: 1.53 MG/DL — HIGH (ref 0.5–1.3)
GLUCOSE SERPL-MCNC: 145 MG/DL — HIGH (ref 70–99)
GLUCOSE SERPL-MCNC: 94 MG/DL — SIGNIFICANT CHANGE UP (ref 70–99)
HCT VFR BLD CALC: 23.9 % — LOW (ref 34.5–45)
HCT VFR BLD CALC: 26.2 % — LOW (ref 34.5–45)
HCT VFR BLD CALC: 26.5 % — LOW (ref 34.5–45)
HGB BLD-MCNC: 8.3 G/DL — LOW (ref 11.5–15.5)
HGB BLD-MCNC: 8.9 G/DL — LOW (ref 11.5–15.5)
HGB BLD-MCNC: 9.3 G/DL — LOW (ref 11.5–15.5)
INR BLD: 1.16 RATIO — SIGNIFICANT CHANGE UP (ref 0.88–1.16)
MAGNESIUM SERPL-MCNC: 2.1 MG/DL — SIGNIFICANT CHANGE UP (ref 1.6–2.6)
MCHC RBC-ENTMCNC: 30 PG — SIGNIFICANT CHANGE UP (ref 27–34)
MCHC RBC-ENTMCNC: 30.8 PG — SIGNIFICANT CHANGE UP (ref 27–34)
MCHC RBC-ENTMCNC: 31.8 PG — SIGNIFICANT CHANGE UP (ref 27–34)
MCHC RBC-ENTMCNC: 33.6 GM/DL — SIGNIFICANT CHANGE UP (ref 32–36)
MCHC RBC-ENTMCNC: 34.8 GM/DL — SIGNIFICANT CHANGE UP (ref 32–36)
MCHC RBC-ENTMCNC: 35.4 GM/DL — SIGNIFICANT CHANGE UP (ref 32–36)
MCV RBC AUTO: 88.5 FL — SIGNIFICANT CHANGE UP (ref 80–100)
MCV RBC AUTO: 89.3 FL — SIGNIFICANT CHANGE UP (ref 80–100)
MCV RBC AUTO: 89.7 FL — SIGNIFICANT CHANGE UP (ref 80–100)
PHOSPHATE SERPL-MCNC: 4 MG/DL — SIGNIFICANT CHANGE UP (ref 2.5–4.5)
PLATELET # BLD AUTO: 380 K/UL — SIGNIFICANT CHANGE UP (ref 150–400)
PLATELET # BLD AUTO: 396 K/UL — SIGNIFICANT CHANGE UP (ref 150–400)
PLATELET # BLD AUTO: 432 K/UL — HIGH (ref 150–400)
POTASSIUM SERPL-MCNC: 4 MMOL/L — SIGNIFICANT CHANGE UP (ref 3.5–5.3)
POTASSIUM SERPL-MCNC: 4.1 MMOL/L — SIGNIFICANT CHANGE UP (ref 3.5–5.3)
POTASSIUM SERPL-SCNC: 4 MMOL/L — SIGNIFICANT CHANGE UP (ref 3.5–5.3)
POTASSIUM SERPL-SCNC: 4.1 MMOL/L — SIGNIFICANT CHANGE UP (ref 3.5–5.3)
PROTHROM AB SERPL-ACNC: 13.3 SEC — HIGH (ref 10–12.9)
RBC # BLD: 2.7 M/UL — LOW (ref 3.8–5.2)
RBC # BLD: 2.92 M/UL — LOW (ref 3.8–5.2)
RBC # BLD: 2.97 M/UL — LOW (ref 3.8–5.2)
RBC # FLD: 15 % — HIGH (ref 10.3–14.5)
RBC # FLD: 15 % — HIGH (ref 10.3–14.5)
RBC # FLD: 15.2 % — HIGH (ref 10.3–14.5)
SODIUM SERPL-SCNC: 142 MMOL/L — SIGNIFICANT CHANGE UP (ref 135–145)
SODIUM SERPL-SCNC: 142 MMOL/L — SIGNIFICANT CHANGE UP (ref 135–145)
WBC # BLD: 6.5 K/UL — SIGNIFICANT CHANGE UP (ref 3.8–10.5)
WBC # BLD: 6.9 K/UL — SIGNIFICANT CHANGE UP (ref 3.8–10.5)
WBC # BLD: 7 K/UL — SIGNIFICANT CHANGE UP (ref 3.8–10.5)
WBC # FLD AUTO: 6.5 K/UL — SIGNIFICANT CHANGE UP (ref 3.8–10.5)
WBC # FLD AUTO: 6.9 K/UL — SIGNIFICANT CHANGE UP (ref 3.8–10.5)
WBC # FLD AUTO: 7 K/UL — SIGNIFICANT CHANGE UP (ref 3.8–10.5)

## 2019-07-09 RX ORDER — HEPARIN SODIUM 5000 [USP'U]/ML
950 INJECTION INTRAVENOUS; SUBCUTANEOUS
Qty: 25000 | Refills: 0 | Status: DISCONTINUED | OUTPATIENT
Start: 2019-07-09 | End: 2019-07-09

## 2019-07-09 RX ORDER — HEPARIN SODIUM 5000 [USP'U]/ML
1000 INJECTION INTRAVENOUS; SUBCUTANEOUS
Qty: 25000 | Refills: 0 | Status: DISCONTINUED | OUTPATIENT
Start: 2019-07-09 | End: 2019-07-11

## 2019-07-09 RX ADMIN — PANTOPRAZOLE SODIUM 40 MILLIGRAM(S): 20 TABLET, DELAYED RELEASE ORAL at 17:12

## 2019-07-09 RX ADMIN — Medication 975 MILLIGRAM(S): at 05:45

## 2019-07-09 RX ADMIN — HEPARIN SODIUM 10 UNIT(S)/HR: 5000 INJECTION INTRAVENOUS; SUBCUTANEOUS at 21:36

## 2019-07-09 RX ADMIN — PANTOPRAZOLE SODIUM 40 MILLIGRAM(S): 20 TABLET, DELAYED RELEASE ORAL at 05:45

## 2019-07-09 RX ADMIN — Medication 975 MILLIGRAM(S): at 16:00

## 2019-07-09 RX ADMIN — Medication 100 MILLIGRAM(S): at 05:45

## 2019-07-09 RX ADMIN — SIMVASTATIN 20 MILLIGRAM(S): 20 TABLET, FILM COATED ORAL at 21:37

## 2019-07-09 RX ADMIN — ZINC OXIDE 1 APPLICATION(S): 200 OINTMENT TOPICAL at 05:46

## 2019-07-09 RX ADMIN — Medication 5 MILLIGRAM(S): at 12:50

## 2019-07-09 RX ADMIN — HEPARIN SODIUM 9.5 UNIT(S)/HR: 5000 INJECTION INTRAVENOUS; SUBCUTANEOUS at 13:52

## 2019-07-09 RX ADMIN — Medication 325 MILLIGRAM(S): at 12:50

## 2019-07-09 RX ADMIN — ZINC OXIDE 1 APPLICATION(S): 200 OINTMENT TOPICAL at 17:11

## 2019-07-09 RX ADMIN — Medication 975 MILLIGRAM(S): at 15:37

## 2019-07-09 RX ADMIN — Medication 975 MILLIGRAM(S): at 06:38

## 2019-07-09 RX ADMIN — Medication 100 MILLIGRAM(S): at 21:36

## 2019-07-09 NOTE — PROGRESS NOTE ADULT - ASSESSMENT
89F hx of Open reduction and internal fixation of hip. Patient s/p cerebrovascular accident due to carotid stenosis. Found to have guaiac positive stool. Patient s/p EGD 89F hx of Open reduction and internal fixation of hip. Patient s/p cerebrovascular accident due to carotid stenosis. Found to have guaiac positive stool. 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

## 2019-07-09 NOTE — PROGRESS NOTE ADULT - ATTENDING COMMENTS
89F hx of Open reduction and internal fixation of hip. Patient s/p cerebrovascular accident due to carotid stenosis. Found to have guaiac positive stool. 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

## 2019-07-09 NOTE — CHART NOTE - NSCHARTNOTEFT_GEN_A_CORE
CBC post PRBC transfusion last night - 8.3/23.9 and repeat in am 8.9/26.5  Discussed with Dr. Amin who cleared patient to start patient on a heparin drip - patient specific with no initial bolus and PTT goal of 60 - 70. Discussed the same with  who concurs with the heparin recommendation    [id number] CBC post PRBC transfusion last night - 8.3/23.9 and repeat in am 8.9/26.5  Discussed with Dr. Amin who cleared patient to start patient on a heparin drip - patient specific with no initial bolus and PTT goal of 60 - 70. Discussed the same with  who concurs with the heparin recommendation. Monitor CBC on heparin drip     [id number]

## 2019-07-09 NOTE — PROGRESS NOTE ADULT - SUBJECTIVE AND OBJECTIVE BOX
Patient is a 89y old  Female who presents with a chief complaint of hip fracture.  s/p mech fall in the kitchen onto R hip when right leg gave way, now c/o severe right hip pain and inability to ambulate.  Patient denies headstrike or LOC. Localizes pain to right hip/femur/knee. Patient denies radiation of pain. Patient denies numbness/tingling/burning in the RLE. No other bone/joint complaints. Patient is a community ambulator at baseline with a rolling walker. Patient has no issues w/ ADLs/IADLs. The patient underwent a right hemiarthroplasty ORIF surgery on 6/22/2019. When last seen on the morning of 6/23/19, she is wanted to be out of bed to chair and begin ambulation. She had an uneventful day until 6:30 PM when she noted weakness of her left hand and altered ability to move her fingers.  Her daughter noticed facial weakness and slurring of words.  No staff was notified and the patient remained this way until next examined by physical therapy on the morning of 6/24/19.  She was found to have  significant left hemiparesis of the left arm greater than left leg, with facial droop and difficulty articulating.  The patient was alert and oriented x3 and she answered questions appropriately.  She had clear weakness in the left arm and left leg as compared to the day before.  EKG was obtained and she was in normal sinus rhythm,  with no ST-T wave changes. Blood pressure was low between 100- 120 systolic.  Blood pressure medications were held, unless blood pressure was greater than 130 systolic.  The patient had a neurological consultation and  right brain CVA was suspected.  The patient underwent CT of the brain at this time showed no hemorrhages or masses.  There was no hydrocephalus.  Possibility of infarction was present and an MRI was advised.  After the CT scan of the brain the patient received 325 mg of aspirin.  It was now approximately 16 hours after the onset of left hemiparesis.  The patient was transferred to a monitored bed to rule out  episodic paroxysmal atrial fibrillation that could have caused her  acute CVA.  The patient had previously been on 2.5 mg Eliquis twice a day and dosage wasnow increased to 5 mg twice a day for therapeutic anticoagulation.  Her rhythm has remained  NSR with a pulse of 64.  The patient has no pain and with the exception of left-sided weakness, has no changes in her condition.  She complains bitterly of right leg postoperative pain  The patient has no new medical complaints at this time. 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  The patient and daughter were fully  informed of right carotid critical stenosis. Telemetry continues with NSR  and no arrhythmias. Patient seen resting comfortably. speech is minimally slurred and Patient is moving all extremities. Patient found to have worsening anemia. Patient s/p EGD.    MEDICATIONS  (STANDING):  acetaminophen   Tablet .. 975 milliGRAM(s) Oral every 8 hours  amLODIPine   Tablet 5 milliGRAM(s) Oral daily  aspirin 325 milliGRAM(s) Oral daily  docusate sodium 100 milliGRAM(s) Oral three times a day  heparin  Infusion 950 Unit(s)/Hr (9.5 mL/Hr) IV Continuous <Continuous>  lidocaine   Patch 1 Patch Transdermal every 24 hours  losartan 100 milliGRAM(s) Oral daily  oxybutynin 5 milliGRAM(s) Oral daily  pantoprazole    Tablet 40 milliGRAM(s) Oral two times a day  polyethylene glycol 3350 17 Gram(s) Oral daily  senna 2 Tablet(s) Oral at bedtime  simvastatin 20 milliGRAM(s) Oral at bedtime  zinc oxide 20% Ointment 1 Application(s) Topical two times a day    MEDICATIONS  (PRN):  bisacodyl Suppository 10 milliGRAM(s) Rectal daily PRN If no bowel movement  magnesium hydroxide Suspension 30 milliLiter(s) Oral daily PRN Constipation  ondansetron Injectable 4 milliGRAM(s) IV Push every 6 hours PRN Nausea and/or Vomiting          VITALS:   T(C): 36.8 (07-08-19 @ 21:20), Max: 36.8 (07-08-19 @ 04:10)  HR: 75 (07-08-19 @ 21:20) (72 - 77)  BP: 125/65 (07-08-19 @ 21:20) (109/61 - 143/56)  RR: 18 (07-08-19 @ 21:20) (17 - 18)  SpO2: 96% (07-08-19 @ 21:20) (96% - 100%)  Wt(kg): --    PHYSICAL EXAM:  GENERAL: NAD, well nourished and conversant  HEAD:  Atraumatic  EYES: EOM, PERRLA, conjunctiva pink and sclera white  ENT: No tonsillar erythema, exudates, or enlargement, moist mucous membranes, good dentition, no lesions  NECK: Supple, No JVD, normal thyroid, carotids with normal upstrokes and no bruits  CHEST/LUNG: Clear to auscultation bilaterally, No rales, rhonchi, wheezing, or rubs  HEART: Regular rate and rhythm, No murmurs, rubs, or gallops  ABDOMEN: Soft, nondistended, no masses, guarding, tenderness or rebound, bowel sounds present  EXTREMITIES:  swelling of right LE  right groins          LYMPH: No lymphadenopathy noted  SKIN: No rashes or lesions  NERVOUS SYSTEM:  Alert & Oriented X3, normal cognitive function. Motor Strength 5/5 right upper and right lower.  5/5 left upper and left lower extremities, DTRs 2+ intact and symmetric    LABS:        CBC Full  -  ( 08 Jul 2019 05:57 )  WBC Count : 7.1 K/uL  RBC Count : 2.28 M/uL  Hemoglobin : 7.4 g/dL  Hematocrit : 21.0 %  Platelet Count - Automated : 350 K/uL  Mean Cell Volume : 92.1 fl  Mean Cell Hemoglobin : 32.6 pg  Mean Cell Hemoglobin Concentration : 35.4 gm/dL  Auto Neutrophil # : x  Auto Lymphocyte # : x  Auto Monocyte # : x  Auto Eosinophil # : x  Auto Basophil # : x  Auto Neutrophil % : x  Auto Lymphocyte % : x  Auto Monocyte % : x  Auto Eosinophil % : x  Auto Basophil % : x    07-08    144  |  111<H>  |  52<H>  ----------------------------<  106<H>  4.2   |  20<L>  |  1.57<H>    Ca    9.3      08 Jul 2019 05:57        PTT - ( 08 Jul 2019 05:57 )  PTT:62.4 sec    CAPILLARY BLOOD GLUCOSE          RADIOLOGY & ADDITIONAL TESTS: Patient is a 89y old  Female who presents with a chief complaint of hip fracture.  s/p mech fall in the kitchen onto R hip when right leg gave way, now c/o severe right hip pain and inability to ambulate.  Patient denies headstrike or LOC. Localizes pain to right hip/femur/knee. Patient denies radiation of pain. Patient denies numbness/tingling/burning in the RLE. No other bone/joint complaints. Patient is a community ambulator at baseline with a rolling walker. Patient has no issues w/ ADLs/IADLs. The patient underwent a right hemiarthroplasty ORIF surgery on 6/22/2019. When last seen on the morning of 6/23/19, she is wanted to be out of bed to chair and begin ambulation. She had an uneventful day until 6:30 PM when she noted weakness of her left hand and altered ability to move her fingers.  Her daughter noticed facial weakness and slurring of words.  No staff was notified and the patient remained this way until next examined by physical therapy on the morning of 6/24/19.  She was found to have  significant left hemiparesis of the left arm greater than left leg, with facial droop and difficulty articulating.  The patient was alert and oriented x3 and she answered questions appropriately.  She had clear weakness in the left arm and left leg as compared to the day before.  EKG was obtained and she was in normal sinus rhythm,  with no ST-T wave changes. Blood pressure was low between 100- 120 systolic.  Blood pressure medications were held, unless blood pressure was greater than 130 systolic.  The patient had a neurological consultation and  right brain CVA was suspected.  The patient underwent CT of the brain at this time showed no hemorrhages or masses.  There was no hydrocephalus.  Possibility of infarction was present and an MRI was advised.  After the CT scan of the brain the patient received 325 mg of aspirin.  It was now approximately 16 hours after the onset of left hemiparesis.  The patient was transferred to a monitored bed to rule out  episodic paroxysmal atrial fibrillation that could have caused her  acute CVA.  The patient had previously been on 2.5 mg Eliquis twice a day and dosage wasnow increased to 5 mg twice a day for therapeutic anticoagulation.  Her rhythm has remained  NSR with a pulse of 64.  The patient has no pain and with the exception of left-sided weakness, has no changes in her condition.  She complains bitterly of right leg postoperative pain  The patient has no new medical complaints at this time. 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  The patient and daughter were fully  informed of right carotid critical stenosis. Telemetry continues with NSR  and no arrhythmias. Patient seen resting comfortably. speech is minimally slurred and Patient is moving all extremities. Patient found to have worsening anemia. Patient s/p EGD.    MEDICATIONS  (STANDING):  acetaminophen   Tablet .. 975 milliGRAM(s) Oral every 8 hours  amLODIPine   Tablet 5 milliGRAM(s) Oral daily  aspirin 325 milliGRAM(s) Oral daily  docusate sodium 100 milliGRAM(s) Oral three times a day  heparin  Infusion 950 Unit(s)/Hr (9.5 mL/Hr) IV Continuous <Continuous>  lidocaine   Patch 1 Patch Transdermal every 24 hours  losartan 100 milliGRAM(s) Oral daily  oxybutynin 5 milliGRAM(s) Oral daily  pantoprazole    Tablet 40 milliGRAM(s) Oral two times a day  polyethylene glycol 3350 17 Gram(s) Oral daily  senna 2 Tablet(s) Oral at bedtime  simvastatin 20 milliGRAM(s) Oral at bedtime  zinc oxide 20% Ointment 1 Application(s) Topical two times a day    MEDICATIONS  (PRN):  bisacodyl Suppository 10 milliGRAM(s) Rectal daily PRN If no bowel movement  magnesium hydroxide Suspension 30 milliLiter(s) Oral daily PRN Constipation  ondansetron Injectable 4 milliGRAM(s) IV Push every 6 hours PRN Nausea and/or Vomiting    Vital Signs Last 24 Hrs  T(C): 36.8 (09 Jul 2019 20:56), Max: 37.1 (09 Jul 2019 04:19)  T(F): 98.2 (09 Jul 2019 20:56), Max: 98.8 (09 Jul 2019 04:19)  HR: 65 (09 Jul 2019 20:56) (54 - 72)  BP: 134/65 (09 Jul 2019 20:56) (112/63 - 134/65)  BP(mean): --  RR: 18 (09 Jul 2019 20:56) (16 - 18)  SpO2: 99% (09 Jul 2019 20:56) (98% - 99%)    PHYSICAL EXAM:  GENERAL: NAD, well nourished and conversant  HEAD:  Atraumatic  EYES: EOM, PERRLA, conjunctiva pink and sclera white  ENT: No tonsillar erythema, exudates, or enlargement, moist mucous membranes, good dentition, no lesions  NECK: Supple, No JVD, normal thyroid, carotids with normal upstrokes and no bruits  CHEST/LUNG: Clear to auscultation bilaterally, No rales, rhonchi, wheezing, or rubs  HEART: Regular rate and rhythm, No murmurs, rubs, or gallops  ABDOMEN: Soft, nondistended, no masses, guarding, tenderness or rebound, bowel sounds present  EXTREMITIES:  swelling of right LE  right groins          LYMPH: No lymphadenopathy noted  SKIN: No rashes or lesions  NERVOUS SYSTEM:  Alert & Oriented X3, normal cognitive function. Motor Strength 5/5 right upper and right lower.  5/5 left upper and left lower extremities, DTRs 2+ intact and symmetric    LABS:    CBC Full  -  ( 09 Jul 2019 20:33 )  WBC Count : 7.0 K/uL  RBC Count : 2.92 M/uL  Hemoglobin : 9.3 g/dL  Hematocrit : 26.2 %  Platelet Count - Automated : 380 K/uL  Mean Cell Volume : 89.7 fl  Mean Cell Hemoglobin : 31.8 pg  Mean Cell Hemoglobin Concentration : 35.4 gm/dL  Auto Neutrophil # : x  Auto Lymphocyte # : x  Auto Monocyte # : x  Auto Eosinophil # : x  Auto Basophil # : x  Auto Neutrophil % : x  Auto Lymphocyte % : x  Auto Monocyte % : x  Auto Eosinophil % : x  Auto Basophil % : x    07-09    142  |  111<H>  |  41<H>  ----------------------------<  145<H>  4.0   |  22  |  1.53<H>    Ca    9.7      09 Jul 2019 09:38  Phos  4.0     07-09  Mg     2.1     07-09        PT/INR - ( 09 Jul 2019 09:38 )   PT: 13.3 sec;   INR: 1.16 ratio         PTT - ( 09 Jul 2019 20:33 )  PTT:56.6 sec Patient is a 89y old  Female who presents with a chief complaint of hip fracture.  s/p mech fall in the kitchen onto R hip when right leg gave way, now c/o severe right hip pain and inability to ambulate.  Patient denies headstrike or LOC. Localizes pain to right hip/femur/knee. Patient denies radiation of pain. Patient denies numbness/tingling/burning in the RLE. No other bone/joint complaints. Patient is a community ambulator at baseline with a rolling walker. Patient has no issues w/ ADLs/IADLs. The patient underwent a right hemiarthroplasty ORIF surgery on 6/22/2019. When last seen on the morning of 6/23/19, she is wanted to be out of bed to chair and begin ambulation. She had an uneventful day until 6:30 PM when she noted weakness of her left hand and altered ability to move her fingers.  Her daughter noticed facial weakness and slurring of words.  No staff was notified and the patient remained this way until next examined by physical therapy on the morning of 6/24/19.  She was found to have  significant left hemiparesis of the left arm greater than left leg, with facial droop and difficulty articulating.  The patient was alert and oriented x3 and she answered questions appropriately.  She had clear weakness in the left arm and left leg as compared to the day before.  EKG was obtained and she was in normal sinus rhythm,  with no ST-T wave changes. Blood pressure was low between 100- 120 systolic.  Blood pressure medications were held, unless blood pressure was greater than 130 systolic.  The patient had a neurological consultation and  right brain CVA was suspected.  The patient underwent CT of the brain at this time showed no hemorrhages or masses.  There was no hydrocephalus.  Possibility of infarction was present and an MRI was advised.  After the CT scan of the brain the patient received 325 mg of aspirin.  It was now approximately 16 hours after the onset of left hemiparesis.  The patient was transferred to a monitored bed to rule out  episodic paroxysmal atrial fibrillation that could have caused her  acute CVA.  The patient had previously been on 2.5 mg Eliquis twice a day and dosage wasnow increased to 5 mg twice a day for therapeutic anticoagulation.  Her rhythm has remained  NSR with a pulse of 64.  The patient has no pain and with the exception of left-sided weakness, has no changes in her condition.  She complains bitterly of right leg postoperative pain  The patient has no new medical complaints at this time. 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  The patient and daughter were fully  informed of right carotid critical stenosis. Telemetry continues with NSR  and no arrhythmias. Patient seen resting comfortably. speech is minimally slurred and Patient is moving all extremities. Patient found to have worsening anemia. 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 GI states that oral anticoagulation can now be restarted.    MEDICATIONS  (STANDING):  acetaminophen   Tablet .. 975 milliGRAM(s) Oral every 8 hours  amLODIPine   Tablet 5 milliGRAM(s) Oral daily  aspirin 325 milliGRAM(s) Oral daily  docusate sodium 100 milliGRAM(s) Oral three times a day  heparin  Infusion 950 Unit(s)/Hr (9.5 mL/Hr) IV Continuous <Continuous>  lidocaine   Patch 1 Patch Transdermal every 24 hours  losartan 100 milliGRAM(s) Oral daily  oxybutynin 5 milliGRAM(s) Oral daily  pantoprazole    Tablet 40 milliGRAM(s) Oral two times a day  polyethylene glycol 3350 17 Gram(s) Oral daily  senna 2 Tablet(s) Oral at bedtime  simvastatin 20 milliGRAM(s) Oral at bedtime  zinc oxide 20% Ointment 1 Application(s) Topical two times a day    MEDICATIONS  (PRN):  bisacodyl Suppository 10 milliGRAM(s) Rectal daily PRN If no bowel movement  magnesium hydroxide Suspension 30 milliLiter(s) Oral daily PRN Constipation  ondansetron Injectable 4 milliGRAM(s) IV Push every 6 hours PRN Nausea and/or Vomiting    Vital Signs Last 24 Hrs  T(C): 36.8 (09 Jul 2019 20:56), Max: 37.1 (09 Jul 2019 04:19)  T(F): 98.2 (09 Jul 2019 20:56), Max: 98.8 (09 Jul 2019 04:19)  HR: 65 (09 Jul 2019 20:56) (54 - 72)  BP: 134/65 (09 Jul 2019 20:56) (112/63 - 134/65)  BP(mean): --  RR: 18 (09 Jul 2019 20:56) (16 - 18)  SpO2: 99% (09 Jul 2019 20:56) (98% - 99%)    PHYSICAL EXAM:  GENERAL: NAD, well nourished and conversant  HEAD:  Atraumatic  EYES: EOM, PERRLA, conjunctiva pink and sclera white  ENT: No tonsillar erythema, exudates, or enlargement, moist mucous membranes, good dentition, no lesions  NECK: Supple, No JVD, normal thyroid, carotids with normal upstrokes and no bruits  CHEST/LUNG: Clear to auscultation bilaterally, No rales, rhonchi, wheezing, or rubs  HEART: Regular rate and rhythm, No murmurs, rubs, or gallops  ABDOMEN: Soft, nondistended, no masses, guarding, tenderness or rebound, bowel sounds present  EXTREMITIES:  swelling of right LE  right groins          LYMPH: No lymphadenopathy noted  SKIN: No rashes or lesions  NERVOUS SYSTEM:  Alert & Oriented X3, normal cognitive function. Motor Strength 5/5 right upper and right lower.  5/5 left upper and left lower extremities, DTRs 2+ intact and symmetric    LABS:    CBC Full  -  ( 09 Jul 2019 20:33 )  WBC Count : 7.0 K/uL  RBC Count : 2.92 M/uL  Hemoglobin : 9.3 g/dL  Hematocrit : 26.2 %  Platelet Count - Automated : 380 K/uL  Mean Cell Volume : 89.7 fl  Mean Cell Hemoglobin : 31.8 pg  Mean Cell Hemoglobin Concentration : 35.4 gm/dL  Auto Neutrophil # : x  Auto Lymphocyte # : x  Auto Monocyte # : x  Auto Eosinophil # : x  Auto Basophil # : x  Auto Neutrophil % : x  Auto Lymphocyte % : x  Auto Monocyte % : x  Auto Eosinophil % : x  Auto Basophil % : x    07-09    142  |  111<H>  |  41<H>  ----------------------------<  145<H>  4.0   |  22  |  1.53<H>    Ca    9.7      09 Jul 2019 09:38  Phos  4.0     07-09  Mg     2.1     07-09        PT/INR - ( 09 Jul 2019 09:38 )   PT: 13.3 sec;   INR: 1.16 ratio         PTT - ( 09 Jul 2019 20:33 )  PTT:56.6 sec

## 2019-07-09 NOTE — PROGRESS NOTE ADULT - SUBJECTIVE AND OBJECTIVE BOX
INTERVAL HPI/OVERNIGHT EVENTS:    MEDICATIONS  (STANDING):  acetaminophen   Tablet .. 975 milliGRAM(s) Oral every 8 hours  amLODIPine   Tablet 5 milliGRAM(s) Oral daily  aspirin 325 milliGRAM(s) Oral daily  docusate sodium 100 milliGRAM(s) Oral three times a day  lidocaine   Patch 1 Patch Transdermal every 24 hours  losartan 100 milliGRAM(s) Oral daily  oxybutynin 5 milliGRAM(s) Oral daily  pantoprazole    Tablet 40 milliGRAM(s) Oral two times a day  polyethylene glycol 3350 17 Gram(s) Oral daily  senna 2 Tablet(s) Oral at bedtime  simvastatin 20 milliGRAM(s) Oral at bedtime  zinc oxide 20% Ointment 1 Application(s) Topical two times a day    MEDICATIONS  (PRN):  bisacodyl Suppository 10 milliGRAM(s) Rectal daily PRN If no bowel movement  magnesium hydroxide Suspension 30 milliLiter(s) Oral daily PRN Constipation  ondansetron Injectable 4 milliGRAM(s) IV Push every 6 hours PRN Nausea and/or Vomiting      Allergies    No Known Drug Allergies  Peaches (Rash)    Intolerances            PHYSICAL EXAM:   Vital Signs:  Vital Signs Last 24 Hrs  T(C): 37.1 (2019 04:19), Max: 37.1 (2019 04:19)  T(F): 98.8 (2019 04:19), Max: 98.8 (2019 04:19)  HR: 72 (2019 04:19) (70 - 77)  BP: 112/63 (2019 04:19) (112/63 - 143/56)  BP(mean): --  RR: 16 (2019 04:19) (16 - 18)  SpO2: 98% (2019 04:19) (96% - 100%)  Daily     Daily Weight in k.9 (2019 07:51)    GENERAL:  no distress  HEENT:  NC/AT,  anicteric  CHEST:   no increased effort, breath sounds clear  HEART:  Regular rhythm  ABDOMEN:  Soft, non-tender, non-distended, normoactive bowel sounds,  no masses ,no hepato-splenomegaly, no signs of chronic liver disease  EXTEREMITIES:  no cyanosis      LABS:                        8.3    6.5   )-----------( 396      ( 2019 01:12 )             23.9     -    142  |  111<H>  |  45<H>  ----------------------------<  94  4.1   |  21<L>  |  1.47<H>    Ca    8.9      2019 01:12  Phos  4.0       Mg     2.1           PTT - ( 2019 05:57 )  PTT:62.4 sec      RADIOLOGY & ADDITIONAL TESTS:

## 2019-07-09 NOTE — PROVIDER CONTACT NOTE (OTHER) - RECOMMENDATIONS
Continue to monitor patient and draw BMP, Mag and Phos when the post transfusion CBC is due as per PA request.

## 2019-07-09 NOTE — PROGRESS NOTE ADULT - ASSESSMENT
did well overnight, black stool last night, no BM this am  s/p cautery of duodenal AVMs  to follow CBC  diet as tolerates

## 2019-07-10 LAB
APTT BLD: 62.9 SEC — HIGH (ref 27.5–36.3)
APTT BLD: 63.8 SEC — HIGH (ref 27.5–36.3)
HCT VFR BLD CALC: 23.8 % — LOW (ref 34.5–45)
HCT VFR BLD CALC: 24.9 % — LOW (ref 34.5–45)
HCT VFR BLD CALC: 27.5 % — LOW (ref 34.5–45)
HGB BLD-MCNC: 8.5 G/DL — LOW (ref 11.5–15.5)
HGB BLD-MCNC: 9 G/DL — LOW (ref 11.5–15.5)
HGB BLD-MCNC: 9.6 G/DL — LOW (ref 11.5–15.5)
MCHC RBC-ENTMCNC: 31.6 PG — SIGNIFICANT CHANGE UP (ref 27–34)
MCHC RBC-ENTMCNC: 32 PG — SIGNIFICANT CHANGE UP (ref 27–34)
MCHC RBC-ENTMCNC: 32.6 PG — SIGNIFICANT CHANGE UP (ref 27–34)
MCHC RBC-ENTMCNC: 35.1 GM/DL — SIGNIFICANT CHANGE UP (ref 32–36)
MCHC RBC-ENTMCNC: 35.6 GM/DL — SIGNIFICANT CHANGE UP (ref 32–36)
MCHC RBC-ENTMCNC: 36.1 GM/DL — HIGH (ref 32–36)
MCV RBC AUTO: 89.8 FL — SIGNIFICANT CHANGE UP (ref 80–100)
MCV RBC AUTO: 90 FL — SIGNIFICANT CHANGE UP (ref 80–100)
MCV RBC AUTO: 90.4 FL — SIGNIFICANT CHANGE UP (ref 80–100)
PLATELET # BLD AUTO: 345 K/UL — SIGNIFICANT CHANGE UP (ref 150–400)
PLATELET # BLD AUTO: 379 K/UL — SIGNIFICANT CHANGE UP (ref 150–400)
PLATELET # BLD AUTO: 402 K/UL — HIGH (ref 150–400)
RBC # BLD: 2.65 M/UL — LOW (ref 3.8–5.2)
RBC # BLD: 2.75 M/UL — LOW (ref 3.8–5.2)
RBC # BLD: 3.05 M/UL — LOW (ref 3.8–5.2)
RBC # FLD: 14.9 % — HIGH (ref 10.3–14.5)
RBC # FLD: 15 % — HIGH (ref 10.3–14.5)
RBC # FLD: 15 % — HIGH (ref 10.3–14.5)
WBC # BLD: 6.2 K/UL — SIGNIFICANT CHANGE UP (ref 3.8–10.5)
WBC # BLD: 6.7 K/UL — SIGNIFICANT CHANGE UP (ref 3.8–10.5)
WBC # BLD: 6.7 K/UL — SIGNIFICANT CHANGE UP (ref 3.8–10.5)
WBC # FLD AUTO: 6.2 K/UL — SIGNIFICANT CHANGE UP (ref 3.8–10.5)
WBC # FLD AUTO: 6.7 K/UL — SIGNIFICANT CHANGE UP (ref 3.8–10.5)
WBC # FLD AUTO: 6.7 K/UL — SIGNIFICANT CHANGE UP (ref 3.8–10.5)

## 2019-07-10 RX ADMIN — Medication 975 MILLIGRAM(S): at 14:09

## 2019-07-10 RX ADMIN — Medication 975 MILLIGRAM(S): at 21:38

## 2019-07-10 RX ADMIN — Medication 325 MILLIGRAM(S): at 11:31

## 2019-07-10 RX ADMIN — PANTOPRAZOLE SODIUM 40 MILLIGRAM(S): 20 TABLET, DELAYED RELEASE ORAL at 17:35

## 2019-07-10 RX ADMIN — Medication 975 MILLIGRAM(S): at 22:08

## 2019-07-10 RX ADMIN — Medication 100 MILLIGRAM(S): at 05:34

## 2019-07-10 RX ADMIN — SIMVASTATIN 20 MILLIGRAM(S): 20 TABLET, FILM COATED ORAL at 21:39

## 2019-07-10 RX ADMIN — Medication 975 MILLIGRAM(S): at 05:34

## 2019-07-10 RX ADMIN — Medication 100 MILLIGRAM(S): at 21:39

## 2019-07-10 RX ADMIN — ZINC OXIDE 1 APPLICATION(S): 200 OINTMENT TOPICAL at 05:35

## 2019-07-10 RX ADMIN — Medication 975 MILLIGRAM(S): at 14:26

## 2019-07-10 RX ADMIN — Medication 100 MILLIGRAM(S): at 14:09

## 2019-07-10 RX ADMIN — Medication 5 MILLIGRAM(S): at 11:31

## 2019-07-10 RX ADMIN — PANTOPRAZOLE SODIUM 40 MILLIGRAM(S): 20 TABLET, DELAYED RELEASE ORAL at 05:34

## 2019-07-10 RX ADMIN — HEPARIN SODIUM 10 UNIT(S)/HR: 5000 INJECTION INTRAVENOUS; SUBCUTANEOUS at 11:43

## 2019-07-10 RX ADMIN — ZINC OXIDE 1 APPLICATION(S): 200 OINTMENT TOPICAL at 17:05

## 2019-07-10 NOTE — PROGRESS NOTE ADULT - ASSESSMENT
stable overnight, no BMs, Hb fluctuating between 8.5 and 9  continue PPI therapy BID for now  ok for d/c from GI perspective  diet as tolerates

## 2019-07-10 NOTE — PROGRESS NOTE ADULT - SUBJECTIVE AND OBJECTIVE BOX
Patient is a 89y old  Female who presents with a chief complaint of hip fracture.  s/p mech fall in the kitchen onto R hip when right leg gave way, now c/o severe right hip pain and inability to ambulate.  Patient denies headstrike or LOC. Localizes pain to right hip/femur/knee. Patient denies radiation of pain. Patient denies numbness/tingling/burning in the RLE. No other bone/joint complaints. Patient is a community ambulator at baseline with a rolling walker. Patient has no issues w/ ADLs/IADLs. The patient underwent a right hemiarthroplasty ORIF surgery on 6/22/2019. When last seen on the morning of 6/23/19, she is wanted to be out of bed to chair and begin ambulation. She had an uneventful day until 6:30 PM when she noted weakness of her left hand and altered ability to move her fingers.  Her daughter noticed facial weakness and slurring of words.  No staff was notified and the patient remained this way until next examined by physical therapy on the morning of 6/24/19.  She was found to have  significant left hemiparesis of the left arm greater than left leg, with facial droop and difficulty articulating.  The patient was alert and oriented x3 and she answered questions appropriately.  She had clear weakness in the left arm and left leg as compared to the day before.  EKG was obtained and she was in normal sinus rhythm,  with no ST-T wave changes. Blood pressure was low between 100- 120 systolic.  Blood pressure medications were held, unless blood pressure was greater than 130 systolic.  The patient had a neurological consultation and  right brain CVA was suspected.  The patient underwent CT of the brain at this time showed no hemorrhages or masses.  There was no hydrocephalus.  Possibility of infarction was present and an MRI was advised.  After the CT scan of the brain the patient received 325 mg of aspirin.  It was now approximately 16 hours after the onset of left hemiparesis.  The patient was transferred to a monitored bed to rule out  episodic paroxysmal atrial fibrillation that could have caused her  acute CVA.  The patient had previously been on 2.5 mg Eliquis twice a day and dosage wasnow increased to 5 mg twice a day for therapeutic anticoagulation.  Her rhythm has remained  NSR with a pulse of 64.  The patient has no pain and with the exception of left-sided weakness, has no changes in her condition.  She complains bitterly of right leg postoperative pain  The patient has no new medical complaints at this time. 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  The patient and daughter were fully  informed of right carotid critical stenosis. Telemetry continues with NSR  and no arrhythmias. Patient seen resting comfortably. speech is minimally slurred and Patient is moving all extremities. Patient found to have worsening anemia. 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 GI states that oral anticoagulation can now be restarted. Pt states she has been feeling well denies any pain.    MEDICATIONS  (STANDING):  acetaminophen   Tablet .. 975 milliGRAM(s) Oral every 8 hours  amLODIPine   Tablet 5 milliGRAM(s) Oral daily  aspirin 325 milliGRAM(s) Oral daily  docusate sodium 100 milliGRAM(s) Oral three times a day  heparin  Infusion 1000 Unit(s)/Hr (10 mL/Hr) IV Continuous <Continuous>  lidocaine   Patch 1 Patch Transdermal every 24 hours  losartan 100 milliGRAM(s) Oral daily  oxybutynin 5 milliGRAM(s) Oral daily  pantoprazole    Tablet 40 milliGRAM(s) Oral two times a day  polyethylene glycol 3350 17 Gram(s) Oral daily  senna 2 Tablet(s) Oral at bedtime  simvastatin 20 milliGRAM(s) Oral at bedtime  zinc oxide 20% Ointment 1 Application(s) Topical two times a day    MEDICATIONS  (PRN):  bisacodyl Suppository 10 milliGRAM(s) Rectal daily PRN If no bowel movement  magnesium hydroxide Suspension 30 milliLiter(s) Oral daily PRN Constipation  ondansetron Injectable 4 milliGRAM(s) IV Push every 6 hours PRN Nausea and/or Vomiting          VITALS:   T(C): 36.8 (07-10-19 @ 20:35), Max: 36.8 (07-10-19 @ 20:35)  HR: 63 (07-10-19 @ 20:35) (63 - 65)  BP: 135/70 (07-10-19 @ 20:35) (126/71 - 135/70)  RR: 18 (07-10-19 @ 20:35) (15 - 18)  SpO2: 95% (07-10-19 @ 20:35) (92% - 98%)  Wt(kg): --    PHYSICAL EXAM:  GENERAL: NAD, well nourished and conversant  HEAD:  Atraumatic  EYES: EOM, PERRLA, conjunctiva pink and sclera white  ENT: No tonsillar erythema, exudates, or enlargement, moist mucous membranes, good dentition, no lesions  NECK: Supple, No JVD, normal thyroid, carotids with normal upstrokes and no bruits  CHEST/LUNG: Clear to auscultation bilaterally, No rales, rhonchi, wheezing, or rubs  HEART: Regular rate and rhythm, No murmurs, rubs, or gallops  ABDOMEN: Soft, nondistended, no masses, guarding, tenderness or rebound, bowel sounds present  EXTREMITIES:  swelling of right LE  right groins          LYMPH: No lymphadenopathy noted  SKIN: No rashes or lesions  NERVOUS SYSTEM:  Alert & Oriented X3, normal cognitive function. Motor Strength 5/5 right upper and right lower.  5/5 left upper and left lower extremities, DTRs 2+ intact and symmetric    LABS:        CBC Full  -  ( 10 Jul 2019 21:47 )  WBC Count : 6.7 K/uL  RBC Count : 2.75 M/uL  Hemoglobin : 9.0 g/dL  Hematocrit : 24.9 %  Platelet Count - Automated : 379 K/uL  Mean Cell Volume : 90.4 fl  Mean Cell Hemoglobin : 32.6 pg  Mean Cell Hemoglobin Concentration : 36.1 gm/dL  Auto Neutrophil # : x  Auto Lymphocyte # : x  Auto Monocyte # : x  Auto Eosinophil # : x  Auto Basophil # : x  Auto Neutrophil % : x  Auto Lymphocyte % : x  Auto Monocyte % : x  Auto Eosinophil % : x  Auto Basophil % : x    07-09    142  |  111<H>  |  41<H>  ----------------------------<  145<H>  4.0   |  22  |  1.53<H>    Ca    9.7      09 Jul 2019 09:38  Phos  4.0     07-09  Mg     2.1     07-09        PT/INR - ( 09 Jul 2019 09:38 )   PT: 13.3 sec;   INR: 1.16 ratio         PTT - ( 10 Jul 2019 11:14 )  PTT:62.9 sec    CAPILLARY BLOOD GLUCOSE          RADIOLOGY & ADDITIONAL TESTS:

## 2019-07-10 NOTE — PROGRESS NOTE ADULT - SUBJECTIVE AND OBJECTIVE BOX
INTERVAL HPI/OVERNIGHT EVENTS:    MEDICATIONS  (STANDING):  acetaminophen   Tablet .. 975 milliGRAM(s) Oral every 8 hours  amLODIPine   Tablet 5 milliGRAM(s) Oral daily  aspirin 325 milliGRAM(s) Oral daily  docusate sodium 100 milliGRAM(s) Oral three times a day  heparin  Infusion 1000 Unit(s)/Hr (10 mL/Hr) IV Continuous <Continuous>  lidocaine   Patch 1 Patch Transdermal every 24 hours  losartan 100 milliGRAM(s) Oral daily  oxybutynin 5 milliGRAM(s) Oral daily  pantoprazole    Tablet 40 milliGRAM(s) Oral two times a day  polyethylene glycol 3350 17 Gram(s) Oral daily  senna 2 Tablet(s) Oral at bedtime  simvastatin 20 milliGRAM(s) Oral at bedtime  zinc oxide 20% Ointment 1 Application(s) Topical two times a day    MEDICATIONS  (PRN):  bisacodyl Suppository 10 milliGRAM(s) Rectal daily PRN If no bowel movement  magnesium hydroxide Suspension 30 milliLiter(s) Oral daily PRN Constipation  ondansetron Injectable 4 milliGRAM(s) IV Push every 6 hours PRN Nausea and/or Vomiting      Allergies    No Known Drug Allergies  Peaches (Rash)    Intolerances            PHYSICAL EXAM:   Vital Signs:  Vital Signs Last 24 Hrs  T(C): 36.6 (10 Jul 2019 04:35), Max: 36.8 (09 Jul 2019 20:56)  T(F): 97.8 (10 Jul 2019 04:35), Max: 98.2 (09 Jul 2019 20:56)  HR: 65 (10 Jul 2019 04:35) (54 - 65)  BP: 126/71 (10 Jul 2019 04:35) (118/70 - 134/65)  BP(mean): --  RR: 15 (10 Jul 2019 04:35) (15 - 18)  SpO2: 98% (10 Jul 2019 04:35) (98% - 99%)  Daily     Daily     GENERAL:  no distress  HEENT:  NC/AT,  anicteric  CHEST:   no increased effort, breath sounds clear  HEART:  Regular rhythm  ABDOMEN:  Soft, non-tender, non-distended, normoactive bowel sounds,  no masses ,no hepato-splenomegaly, no signs of chronic liver disease  EXTEREMITIES:  no cyanosis      LABS:                        8.5    6.2   )-----------( 345      ( 10 Jul 2019 04:36 )             23.8     07-09    142  |  111<H>  |  41<H>  ----------------------------<  145<H>  4.0   |  22  |  1.53<H>    Ca    9.7      09 Jul 2019 09:38  Phos  4.0     07-09  Mg     2.1     07-09      PT/INR - ( 09 Jul 2019 09:38 )   PT: 13.3 sec;   INR: 1.16 ratio         PTT - ( 10 Jul 2019 04:36 )  PTT:63.8 sec      RADIOLOGY & ADDITIONAL TESTS:

## 2019-07-11 LAB
ANION GAP SERPL CALC-SCNC: 14 MMOL/L — SIGNIFICANT CHANGE UP (ref 5–17)
APTT BLD: 80.3 SEC — HIGH (ref 27.5–36.3)
APTT BLD: 92.7 SEC — HIGH (ref 27.5–36.3)
BUN SERPL-MCNC: 33 MG/DL — HIGH (ref 7–23)
CALCIUM SERPL-MCNC: 8.9 MG/DL — SIGNIFICANT CHANGE UP (ref 8.4–10.5)
CHLORIDE SERPL-SCNC: 111 MMOL/L — HIGH (ref 96–108)
CO2 SERPL-SCNC: 20 MMOL/L — LOW (ref 22–31)
CREAT SERPL-MCNC: 1.47 MG/DL — HIGH (ref 0.5–1.3)
GLUCOSE SERPL-MCNC: 91 MG/DL — SIGNIFICANT CHANGE UP (ref 70–99)
HCT VFR BLD CALC: 25.5 % — LOW (ref 34.5–45)
HCT VFR BLD CALC: 28.4 % — LOW (ref 34.5–45)
HGB BLD-MCNC: 8.6 G/DL — LOW (ref 11.5–15.5)
HGB BLD-MCNC: 9.6 G/DL — LOW (ref 11.5–15.5)
MCHC RBC-ENTMCNC: 30.5 PG — SIGNIFICANT CHANGE UP (ref 27–34)
MCHC RBC-ENTMCNC: 30.6 PG — SIGNIFICANT CHANGE UP (ref 27–34)
MCHC RBC-ENTMCNC: 33.9 GM/DL — SIGNIFICANT CHANGE UP (ref 32–36)
MCHC RBC-ENTMCNC: 33.9 GM/DL — SIGNIFICANT CHANGE UP (ref 32–36)
MCV RBC AUTO: 90.1 FL — SIGNIFICANT CHANGE UP (ref 80–100)
MCV RBC AUTO: 90.2 FL — SIGNIFICANT CHANGE UP (ref 80–100)
PLATELET # BLD AUTO: 396 K/UL — SIGNIFICANT CHANGE UP (ref 150–400)
PLATELET # BLD AUTO: 428 K/UL — HIGH (ref 150–400)
POTASSIUM SERPL-MCNC: 4.2 MMOL/L — SIGNIFICANT CHANGE UP (ref 3.5–5.3)
POTASSIUM SERPL-SCNC: 4.2 MMOL/L — SIGNIFICANT CHANGE UP (ref 3.5–5.3)
RBC # BLD: 2.82 M/UL — LOW (ref 3.8–5.2)
RBC # BLD: 3.15 M/UL — LOW (ref 3.8–5.2)
RBC # FLD: 14.7 % — HIGH (ref 10.3–14.5)
RBC # FLD: 15 % — HIGH (ref 10.3–14.5)
SODIUM SERPL-SCNC: 145 MMOL/L — SIGNIFICANT CHANGE UP (ref 135–145)
WBC # BLD: 5.8 K/UL — SIGNIFICANT CHANGE UP (ref 3.8–10.5)
WBC # BLD: 7 K/UL — SIGNIFICANT CHANGE UP (ref 3.8–10.5)
WBC # FLD AUTO: 5.8 K/UL — SIGNIFICANT CHANGE UP (ref 3.8–10.5)
WBC # FLD AUTO: 7 K/UL — SIGNIFICANT CHANGE UP (ref 3.8–10.5)

## 2019-07-11 RX ORDER — APIXABAN 2.5 MG/1
5 TABLET, FILM COATED ORAL ONCE
Refills: 0 | Status: COMPLETED | OUTPATIENT
Start: 2019-07-11 | End: 2019-07-11

## 2019-07-11 RX ADMIN — Medication 100 MILLIGRAM(S): at 22:00

## 2019-07-11 RX ADMIN — Medication 975 MILLIGRAM(S): at 13:45

## 2019-07-11 RX ADMIN — Medication 975 MILLIGRAM(S): at 23:00

## 2019-07-11 RX ADMIN — Medication 975 MILLIGRAM(S): at 05:58

## 2019-07-11 RX ADMIN — Medication 100 MILLIGRAM(S): at 13:14

## 2019-07-11 RX ADMIN — HEPARIN SODIUM 10 UNIT(S)/HR: 5000 INJECTION INTRAVENOUS; SUBCUTANEOUS at 07:03

## 2019-07-11 RX ADMIN — Medication 975 MILLIGRAM(S): at 13:13

## 2019-07-11 RX ADMIN — PANTOPRAZOLE SODIUM 40 MILLIGRAM(S): 20 TABLET, DELAYED RELEASE ORAL at 05:28

## 2019-07-11 RX ADMIN — ZINC OXIDE 1 APPLICATION(S): 200 OINTMENT TOPICAL at 18:30

## 2019-07-11 RX ADMIN — APIXABAN 5 MILLIGRAM(S): 2.5 TABLET, FILM COATED ORAL at 18:30

## 2019-07-11 RX ADMIN — PANTOPRAZOLE SODIUM 40 MILLIGRAM(S): 20 TABLET, DELAYED RELEASE ORAL at 18:30

## 2019-07-11 RX ADMIN — SENNA PLUS 2 TABLET(S): 8.6 TABLET ORAL at 21:59

## 2019-07-11 RX ADMIN — Medication 325 MILLIGRAM(S): at 13:12

## 2019-07-11 RX ADMIN — ZINC OXIDE 1 APPLICATION(S): 200 OINTMENT TOPICAL at 05:31

## 2019-07-11 RX ADMIN — Medication 975 MILLIGRAM(S): at 05:28

## 2019-07-11 RX ADMIN — Medication 975 MILLIGRAM(S): at 22:00

## 2019-07-11 RX ADMIN — SIMVASTATIN 20 MILLIGRAM(S): 20 TABLET, FILM COATED ORAL at 22:01

## 2019-07-11 RX ADMIN — Medication 5 MILLIGRAM(S): at 13:12

## 2019-07-11 NOTE — PROGRESS NOTE ADULT - SUBJECTIVE AND OBJECTIVE BOX
BRAD NUÑEZ:[id number],   89yFemale followed for:  No Known Drug Allergies  Peaches (Rash)    PAST MEDICAL & SURGICAL HISTORY:  Pleural effusion on left  Splenic abscess  Polio  High cholesterol  Hypertension  History of appendectomy    FAMILY HISTORY:  No pertinent family history in first degree relatives    MEDICATIONS  (STANDING):  acetaminophen   Tablet .. 975 milliGRAM(s) Oral every 8 hours  amLODIPine   Tablet 5 milliGRAM(s) Oral daily  aspirin 325 milliGRAM(s) Oral daily  docusate sodium 100 milliGRAM(s) Oral three times a day  heparin  Infusion 1000 Unit(s)/Hr (10 mL/Hr) IV Continuous <Continuous>  lidocaine   Patch 1 Patch Transdermal every 24 hours  losartan 100 milliGRAM(s) Oral daily  oxybutynin 5 milliGRAM(s) Oral daily  pantoprazole    Tablet 40 milliGRAM(s) Oral two times a day  polyethylene glycol 3350 17 Gram(s) Oral daily  senna 2 Tablet(s) Oral at bedtime  simvastatin 20 milliGRAM(s) Oral at bedtime  zinc oxide 20% Ointment 1 Application(s) Topical two times a day    MEDICATIONS  (PRN):  bisacodyl Suppository 10 milliGRAM(s) Rectal daily PRN If no bowel movement  magnesium hydroxide Suspension 30 milliLiter(s) Oral daily PRN Constipation  ondansetron Injectable 4 milliGRAM(s) IV Push every 6 hours PRN Nausea and/or Vomiting      Vital Signs Last 24 Hrs  T(C): 36.7 (11 Jul 2019 04:24), Max: 36.8 (10 Jul 2019 20:35)  T(F): 98 (11 Jul 2019 04:24), Max: 98.2 (10 Jul 2019 20:35)  HR: 58 (11 Jul 2019 05:24) (56 - 64)  BP: 116/64 (11 Jul 2019 05:24) (103/62 - 135/70)  BP(mean): --  RR: 18 (11 Jul 2019 05:24) (18 - 18)  SpO2: 98% (11 Jul 2019 05:24) (92% - 98%)  nc/at  s1s2  cta  soft, nt, nd no guarding or rebound  no c/c/e    CBC Full  -  ( 10 Jul 2019 21:47 )  WBC Count : 6.7 K/uL  RBC Count : 2.75 M/uL  Hemoglobin : 9.0 g/dL  Hematocrit : 24.9 %  Platelet Count - Automated : 379 K/uL  Mean Cell Volume : 90.4 fl  Mean Cell Hemoglobin : 32.6 pg  Mean Cell Hemoglobin Concentration : 36.1 gm/dL  Auto Neutrophil # : x  Auto Lymphocyte # : x  Auto Monocyte # : x  Auto Eosinophil # : x  Auto Basophil # : x  Auto Neutrophil % : x  Auto Lymphocyte % : x  Auto Monocyte % : x  Auto Eosinophil % : x  Auto Basophil % : x    07-11    145  |  111<H>  |  33<H>  ----------------------------<  91  4.2   |  20<L>  |  1.47<H>    Ca    8.9      11 Jul 2019 06:24      PT/INR - ( 09 Jul 2019 09:38 )   PT: 13.3 sec;   INR: 1.16 ratio         PTT - ( 11 Jul 2019 06:24 )  PTT:80.3 sec

## 2019-07-11 NOTE — PROGRESS NOTE ADULT - ASSESSMENT
continue conservative managment, consider coumadin instead of 10a?  defer to medicine. no further gib.

## 2019-07-11 NOTE — PROVIDER CONTACT NOTE (OTHER) - ASSESSMENT
Pt asleep when HR hit the 40s. When pt woke up, HR was in the 50s and 60s. Other VSS. Pt denies SOB, cp and palpitations.

## 2019-07-11 NOTE — PROGRESS NOTE ADULT - SUBJECTIVE AND OBJECTIVE BOX
Patient is a 89y old  Female who presents with a chief complaint of hip fracture.  s/p mech fall in the kitchen onto R hip when right leg gave way, now c/o severe right hip pain and inability to ambulate.  Patient denies headstrike or LOC. Localizes pain to right hip/femur/knee. Patient denies radiation of pain. Patient denies numbness/tingling/burning in the RLE. No other bone/joint complaints. Patient is a community ambulator at baseline with a rolling walker. Patient has no issues w/ ADLs/IADLs. The patient underwent a right hemiarthroplasty ORIF surgery on 6/22/2019. When last seen on the morning of 6/23/19, she is wanted to be out of bed to chair and begin ambulation. She had an uneventful day until 6:30 PM when she noted weakness of her left hand and altered ability to move her fingers.  Her daughter noticed facial weakness and slurring of words.  No staff was notified and the patient remained this way until next examined by physical therapy on the morning of 6/24/19.  She was found to have  significant left hemiparesis of the left arm greater than left leg, with facial droop and difficulty articulating.  The patient was alert and oriented x3 and she answered questions appropriately.  She had clear weakness in the left arm and left leg as compared to the day before.  EKG was obtained and she was in normal sinus rhythm,  with no ST-T wave changes. Blood pressure was low between 100- 120 systolic.  Blood pressure medications were held, unless blood pressure was greater than 130 systolic.  The patient had a neurological consultation and  right brain CVA was suspected.  The patient underwent CT of the brain at this time showed no hemorrhages or masses.  There was no hydrocephalus.  Possibility of infarction was present and an MRI was advised.  After the CT scan of the brain the patient received 325 mg of aspirin.  It was now approximately 16 hours after the onset of left hemiparesis.  The patient was transferred to a monitored bed to rule out  episodic paroxysmal atrial fibrillation that could have caused her  acute CVA.  The patient had previously been on 2.5 mg Eliquis twice a day and dosage wasnow increased to 5 mg twice a day for therapeutic anticoagulation.  Her rhythm has remained  NSR with a pulse of 64.  The patient has no pain and with the exception of left-sided weakness, has no changes in her condition.  She complains bitterly of right leg postoperative pain  The patient has no new medical complaints at this time. 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  The patient and daughter were fully  informed of right carotid critical stenosis. Telemetry continues with NSR  and no arrhythmias. Patient seen resting comfortably. speech is minimally slurred and Patient is moving all extremities. Patient found to have worsening anemia. 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 GI states that oral anticoagulation can now be restarted. Pt states she has been feeling well denies any pain.    MEDICATIONS  (STANDING):  acetaminophen   Tablet .. 975 milliGRAM(s) Oral every 8 hours  amLODIPine   Tablet 5 milliGRAM(s) Oral daily  aspirin 325 milliGRAM(s) Oral daily  docusate sodium 100 milliGRAM(s) Oral three times a day  heparin  Infusion 1000 Unit(s)/Hr (10 mL/Hr) IV Continuous <Continuous>  lidocaine   Patch 1 Patch Transdermal every 24 hours  losartan 100 milliGRAM(s) Oral daily  oxybutynin 5 milliGRAM(s) Oral daily  pantoprazole    Tablet 40 milliGRAM(s) Oral two times a day  polyethylene glycol 3350 17 Gram(s) Oral daily  senna 2 Tablet(s) Oral at bedtime  simvastatin 20 milliGRAM(s) Oral at bedtime  zinc oxide 20% Ointment 1 Application(s) Topical two times a day    MEDICATIONS  (PRN):  bisacodyl Suppository 10 milliGRAM(s) Rectal daily PRN If no bowel movement  magnesium hydroxide Suspension 30 milliLiter(s) Oral daily PRN Constipation  ondansetron Injectable 4 milliGRAM(s) IV Push every 6 hours PRN Nausea and/or Vomiting      PHYSICAL EXAM:  GENERAL: NAD, well nourished and conversant  HEAD:  Atraumatic  EYES: EOM, PERRLA, conjunctiva pink and sclera white  ENT: No tonsillar erythema, exudates, or enlargement, moist mucous membranes, good dentition, no lesions  NECK: Supple, No JVD, normal thyroid, carotids with normal upstrokes and no bruits  CHEST/LUNG: Clear to auscultation bilaterally, No rales, rhonchi, wheezing, or rubs  HEART: Regular rate and rhythm, No murmurs, rubs, or gallops  ABDOMEN: Soft, nondistended, no masses, guarding, tenderness or rebound, bowel sounds present  EXTREMITIES:  swelling of right LE  right groins          LYMPH: No lymphadenopathy noted  SKIN: No rashes or lesions  NERVOUS SYSTEM:  Alert & Oriented X3, normal cognitive function. Motor Strength 5/5 right upper and right lower.  5/5 left upper and left lower extremities, DTRs 2+ intact and symmetric    VITALS:   T(C): 36.6 (07-11-19 @ 12:15), Max: 36.8 (07-10-19 @ 20:35)  HR: 61 (07-11-19 @ 12:15) (56 - 63)  BP: 116/59 (07-11-19 @ 12:15) (103/62 - 135/70)  RR: 18 (07-11-19 @ 12:15) (18 - 18)  SpO2: 98% (07-11-19 @ 12:15) (95% - 98%)  Wt(kg): --        LABS:        CBC Full  -  ( 11 Jul 2019 06:24 )  WBC Count : 5.8 K/uL  RBC Count : 2.82 M/uL  Hemoglobin : 8.6 g/dL  Hematocrit : 25.5 %  Platelet Count - Automated : 396 K/uL  Mean Cell Volume : 90.2 fl  Mean Cell Hemoglobin : 30.6 pg  Mean Cell Hemoglobin Concentration : 33.9 gm/dL  Auto Neutrophil # : x  Auto Lymphocyte # : x  Auto Monocyte # : x  Auto Eosinophil # : x  Auto Basophil # : x  Auto Neutrophil % : x  Auto Lymphocyte % : x  Auto Monocyte % : x  Auto Eosinophil % : x  Auto Basophil % : x    07-11    145  |  111<H>  |  33<H>  ----------------------------<  91  4.2   |  20<L>  |  1.47<H>    Ca    8.9      11 Jul 2019 06:24        PTT - ( 11 Jul 2019 06:24 )  PTT:80.3 sec    CAPILLARY BLOOD GLUCOSE          RADIOLOGY & ADDITIONAL TESTS:

## 2019-07-12 LAB
ANION GAP SERPL CALC-SCNC: 10 MMOL/L — SIGNIFICANT CHANGE UP (ref 5–17)
BUN SERPL-MCNC: 32 MG/DL — HIGH (ref 7–23)
CALCIUM SERPL-MCNC: 9.7 MG/DL — SIGNIFICANT CHANGE UP (ref 8.4–10.5)
CHLORIDE SERPL-SCNC: 110 MMOL/L — HIGH (ref 96–108)
CO2 SERPL-SCNC: 22 MMOL/L — SIGNIFICANT CHANGE UP (ref 22–31)
CREAT SERPL-MCNC: 1.51 MG/DL — HIGH (ref 0.5–1.3)
GLUCOSE SERPL-MCNC: 92 MG/DL — SIGNIFICANT CHANGE UP (ref 70–99)
HCT VFR BLD CALC: 26.7 % — LOW (ref 34.5–45)
HGB BLD-MCNC: 8.4 G/DL — LOW (ref 11.5–15.5)
MCHC RBC-ENTMCNC: 29.2 PG — SIGNIFICANT CHANGE UP (ref 27–34)
MCHC RBC-ENTMCNC: 31.5 GM/DL — LOW (ref 32–36)
MCV RBC AUTO: 92.7 FL — SIGNIFICANT CHANGE UP (ref 80–100)
PLATELET # BLD AUTO: 406 K/UL — HIGH (ref 150–400)
POTASSIUM SERPL-MCNC: 4 MMOL/L — SIGNIFICANT CHANGE UP (ref 3.5–5.3)
POTASSIUM SERPL-SCNC: 4 MMOL/L — SIGNIFICANT CHANGE UP (ref 3.5–5.3)
RBC # BLD: 2.88 M/UL — LOW (ref 3.8–5.2)
RBC # FLD: 16.3 % — HIGH (ref 10.3–14.5)
SODIUM SERPL-SCNC: 142 MMOL/L — SIGNIFICANT CHANGE UP (ref 135–145)
WBC # BLD: 5.34 K/UL — SIGNIFICANT CHANGE UP (ref 3.8–10.5)
WBC # FLD AUTO: 5.34 K/UL — SIGNIFICANT CHANGE UP (ref 3.8–10.5)

## 2019-07-12 RX ADMIN — Medication 100 MILLIGRAM(S): at 05:33

## 2019-07-12 RX ADMIN — Medication 325 MILLIGRAM(S): at 12:49

## 2019-07-12 RX ADMIN — Medication 100 MILLIGRAM(S): at 21:37

## 2019-07-12 RX ADMIN — PANTOPRAZOLE SODIUM 40 MILLIGRAM(S): 20 TABLET, DELAYED RELEASE ORAL at 18:44

## 2019-07-12 RX ADMIN — SIMVASTATIN 20 MILLIGRAM(S): 20 TABLET, FILM COATED ORAL at 21:37

## 2019-07-12 RX ADMIN — PANTOPRAZOLE SODIUM 40 MILLIGRAM(S): 20 TABLET, DELAYED RELEASE ORAL at 05:33

## 2019-07-12 RX ADMIN — ZINC OXIDE 1 APPLICATION(S): 200 OINTMENT TOPICAL at 18:00

## 2019-07-12 RX ADMIN — ZINC OXIDE 1 APPLICATION(S): 200 OINTMENT TOPICAL at 05:33

## 2019-07-12 RX ADMIN — Medication 5 MILLIGRAM(S): at 12:49

## 2019-07-12 NOTE — PROGRESS NOTE ADULT - SUBJECTIVE AND OBJECTIVE BOX
INTERVAL HPI/OVERNIGHT EVENTS:    MEDICATIONS  (STANDING):  acetaminophen   Tablet .. 975 milliGRAM(s) Oral every 8 hours  amLODIPine   Tablet 5 milliGRAM(s) Oral daily  aspirin 325 milliGRAM(s) Oral daily  docusate sodium 100 milliGRAM(s) Oral three times a day  lidocaine   Patch 1 Patch Transdermal every 24 hours  losartan 100 milliGRAM(s) Oral daily  oxybutynin 5 milliGRAM(s) Oral daily  pantoprazole    Tablet 40 milliGRAM(s) Oral two times a day  polyethylene glycol 3350 17 Gram(s) Oral daily  senna 2 Tablet(s) Oral at bedtime  simvastatin 20 milliGRAM(s) Oral at bedtime  zinc oxide 20% Ointment 1 Application(s) Topical two times a day    MEDICATIONS  (PRN):  bisacodyl Suppository 10 milliGRAM(s) Rectal daily PRN If no bowel movement  magnesium hydroxide Suspension 30 milliLiter(s) Oral daily PRN Constipation  ondansetron Injectable 4 milliGRAM(s) IV Push every 6 hours PRN Nausea and/or Vomiting      Allergies    No Known Drug Allergies  Peaches (Rash)    Intolerances            PHYSICAL EXAM:   Vital Signs:  Vital Signs Last 24 Hrs  T(C): 36.4 (2019 04:47), Max: 36.6 (2019 12:15)  T(F): 97.6 (2019 04:47), Max: 97.9 (2019 12:15)  HR: 67 (2019 04:47) (60 - 67)  BP: 107/63 (2019 04:47) (107/63 - 138/64)  BP(mean): --  RR: 17 (2019 04:47) (17 - 18)  SpO2: 96% (2019 04:47) (95% - 98%)  Daily     Daily Weight in k.9 (2019 08:22)    GENERAL:  no distress  HEENT:  NC/AT,  anicteric  CHEST:   no increased effort, breath sounds clear  HEART:  Regular rhythm  ABDOMEN:  Soft, non-tender, non-distended, normoactive bowel sounds,  no masses ,no hepato-splenomegaly, no signs of chronic liver disease  EXTEREMITIES:  no cyanosis      LABS:                        9.6    7.0   )-----------( 428      ( 2019 14:27 )             28.4     07-12    142  |  110<H>  |  32<H>  ----------------------------<  92  4.0   |  22  |  1.51<H>    Ca    9.7      2019 07:20      PTT - ( 2019 14:27 )  PTT:92.7 sec      RADIOLOGY & ADDITIONAL TESTS:

## 2019-07-12 NOTE — PROVIDER CONTACT NOTE (OTHER) - ACTION/TREATMENT ORDERED:
proper positioning explained to patient and family, risks explained and patient and daughter verbalized understanding
As per DO Miguel Ángel, encouraged PO intake and recheck b/p in AM. Continue to monitor pt.
As per PA, reassess patient's VS. No new orders at this time, continue to monitor patient closely on tele.
NP Sadaf aware, waiting for report from 7 tower PA, will order Tylenol, will give report to night RN, continue to monitor
NP aware. GI consulted. Continue w/ Eliquis for recent stroke.
NP aware. Maintain heparin drip at 10ml/hr. Will continue to monitor.
PA aware, continue to monitor, place R2 pads at bedside, continue tele
PA made aware. Continue to run Heparin gtt at 10 cc/hr.
PA made aware. No interventions at this time. Pt has hx of bradycardia during the night.
Patient stable. BMP, Mag, Phos, CBC draw and results stable.
Urgent CT ordered. EKG done. Transfer to Telemetry in progress once bed becomes available. Will continue to monitor.
tylenol scheduled given.  heat pack given to right groin. Will conitnue to monitor

## 2019-07-12 NOTE — PROGRESS NOTE ADULT - SUBJECTIVE AND OBJECTIVE BOX
Patient is a 89y old  Female who presents with a chief complaint of hip fracture.  s/p mech fall in the kitchen onto R hip when right leg gave way, now c/o severe right hip pain and inability to ambulate.  Patient denies headstrike or LOC. Localizes pain to right hip/femur/knee. Patient denies radiation of pain. Patient denies numbness/tingling/burning in the RLE. No other bone/joint complaints. Patient is a community ambulator at baseline with a rolling walker. Patient has no issues w/ ADLs/IADLs. The patient underwent a right hemiarthroplasty ORIF surgery on 6/22/2019. When last seen on the morning of 6/23/19, she is wanted to be out of bed to chair and begin ambulation. She had an uneventful day until 6:30 PM when she noted weakness of her left hand and altered ability to move her fingers.  Her daughter noticed facial weakness and slurring of words.  No staff was notified and the patient remained this way until next examined by physical therapy on the morning of 6/24/19.  She was found to have  significant left hemiparesis of the left arm greater than left leg, with facial droop and difficulty articulating.  The patient was alert and oriented x3 and she answered questions appropriately.  She had clear weakness in the left arm and left leg as compared to the day before.  EKG was obtained and she was in normal sinus rhythm,  with no ST-T wave changes. Blood pressure was low between 100- 120 systolic.  Blood pressure medications were held, unless blood pressure was greater than 130 systolic.  The patient had a neurological consultation and  right brain CVA was suspected.  The patient underwent CT of the brain at this time showed no hemorrhages or masses.  There was no hydrocephalus.  Possibility of infarction was present and an MRI was advised.  After the CT scan of the brain the patient received 325 mg of aspirin.  It was now approximately 16 hours after the onset of left hemiparesis.  The patient was transferred to a monitored bed to rule out  episodic paroxysmal atrial fibrillation that could have caused her  acute CVA.  The patient had previously been on 2.5 mg Eliquis twice a day and dosage wasnow increased to 5 mg twice a day for therapeutic anticoagulation.  Her rhythm has remained  NSR with a pulse of 64.  The patient has no pain and with the exception of left-sided weakness, has no changes in her condition.  She complains bitterly of right leg postoperative pain  The patient has no new medical complaints at this time. 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  The patient and daughter were fully  informed of right carotid critical stenosis. Telemetry continues with NSR  and no arrhythmias. Patient seen resting comfortably. speech is minimally slurred and Patient is moving all extremities. Patient found to have worsening anemia. 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 GI states that oral anticoagulation can now be restarted. Pt states she has been feeling well denies any pain.  H/H stable  To be transferred to [facility] rehab center in am      MEDICATIONS  (STANDING):  acetaminophen   Tablet .. 975 milliGRAM(s) Oral every 8 hours  amLODIPine   Tablet 5 milliGRAM(s) Oral daily  aspirin 325 milliGRAM(s) Oral daily  docusate sodium 100 milliGRAM(s) Oral three times a day  heparin  Infusion 1000 Unit(s)/Hr (10 mL/Hr) IV Continuous <Continuous>  lidocaine   Patch 1 Patch Transdermal every 24 hours  losartan 100 milliGRAM(s) Oral daily  oxybutynin 5 milliGRAM(s) Oral daily  pantoprazole    Tablet 40 milliGRAM(s) Oral two times a day  polyethylene glycol 3350 17 Gram(s) Oral daily  senna 2 Tablet(s) Oral at bedtime  simvastatin 20 milliGRAM(s) Oral at bedtime  zinc oxide 20% Ointment 1 Application(s) Topical two times a day    MEDICATIONS  (PRN):  bisacodyl Suppository 10 milliGRAM(s) Rectal daily PRN If no bowel movement  magnesium hydroxide Suspension 30 milliLiter(s) Oral daily PRN Constipation  ondansetron Injectable 4 milliGRAM(s) IV Push every 6 hours PRN Nausea and/or Vomiting      PHYSICAL EXAM:  GENERAL: NAD, well nourished and conversant  HEAD:  Atraumatic  EYES: EOM, PERRLA, conjunctiva pink and sclera white  ENT: No tonsillar erythema, exudates, or enlargement, moist mucous membranes, good dentition, no lesions  NECK: Supple, No JVD, normal thyroid, carotids with normal upstrokes and no bruits  CHEST/LUNG: Clear to auscultation bilaterally, No rales, rhonchi, wheezing, or rubs  HEART: Regular rate and rhythm, No murmurs, rubs, or gallops  ABDOMEN: Soft, nondistended, no masses, guarding, tenderness or rebound, bowel sounds present  EXTREMITIES:  swelling of right LE  right groins          LYMPH: No lymphadenopathy noted  SKIN: No rashes or lesions  NERVOUS SYSTEM:  Alert & Oriented X3, normal cognitive function. Motor Strength 5/5 right upper and right lower.  5/5 left upper and left lower extremities, DTRs 2+ intact and symmetric    Vital Signs Last 24 Hrs  T(C): 36.4 (12 Jul 2019 11:09), Max: 36.6 (11 Jul 2019 21:16)  T(F): 97.5 (12 Jul 2019 11:09), Max: 97.9 (12 Jul 2019 01:00)  HR: 63 (12 Jul 2019 11:09) (60 - 67)  BP: 147/70 (12 Jul 2019 11:09) (107/63 - 147/70)  BP(mean): --  RR: 18 (12 Jul 2019 11:09) (17 - 18)  SpO2: 99% (12 Jul 2019 11:09) (96% - 99%)        LABS:          CBC Full  -  ( 12 Jul 2019 09:55 )  WBC Count : 5.34 K/uL  RBC Count : 2.88 M/uL  Hemoglobin : 8.4 g/dL  Hematocrit : 26.7 %  Platelet Count - Automated : 406 K/uL  Mean Cell Volume : 92.7 fl  Mean Cell Hemoglobin : 29.2 pg  Mean Cell Hemoglobin Concentration : 31.5 gm/dL  Auto Neutrophil # : x  Auto Lymphocyte # : x  Auto Monocyte # : x  Auto Eosinophil # : x  Auto Basophil # : x  Auto Neutrophil % : x  Auto Lymphocyte % : x  Auto Monocyte % : x  Auto Eosinophil % : x  Auto Basophil % : x    07-12    142  |  110<H>  |  32<H>  ----------------------------<  92  4.0   |  22  |  1.51<H>    Ca    9.7      12 Jul 2019 07:20        PTT - ( 11 Jul 2019 14:27 )  PTT:92.7 sec      CBC Full  -  ( 11 Jul 2019 06:24 )  WBC Count : 5.8 K/uL  RBC Count : 2.82 M/uL  Hemoglobin : 8.6 g/dL  Hematocrit : 25.5 %  Platelet Count - Automated : 396 K/uL  Mean Cell Volume : 90.2 fl  Mean Cell Hemoglobin : 30.6 pg  Mean Cell Hemoglobin Concentration : 33.9 gm/dL  Auto Neutrophil # : x  Auto Lymphocyte # : x  Auto Monocyte # : x  Auto Eosinophil # : x  Auto Basophil # : x  Auto Neutrophil % : x  Auto Lymphocyte % : x  Auto Monocyte % : x  Auto Eosinophil % : x  Auto Basophil % : x    07-11    145  |  111<H>  |  33<H>  ----------------------------<  91  4.2   |  20<L>  |  1.47<H>    Ca    8.9      11 Jul 2019 06:24        PTT - ( 11 Jul 2019 06:24 )  PTT:80.3 sec    CAPILLARY BLOOD GLUCOSE          RADIOLOGY & ADDITIONAL TESTS:

## 2019-07-13 VITALS
RESPIRATION RATE: 18 BRPM | HEART RATE: 63 BPM | OXYGEN SATURATION: 100 % | SYSTOLIC BLOOD PRESSURE: 125 MMHG | TEMPERATURE: 98 F | DIASTOLIC BLOOD PRESSURE: 62 MMHG

## 2019-07-13 LAB
ANION GAP SERPL CALC-SCNC: 12 MMOL/L — SIGNIFICANT CHANGE UP (ref 5–17)
BUN SERPL-MCNC: 30 MG/DL — HIGH (ref 7–23)
CALCIUM SERPL-MCNC: 10 MG/DL — SIGNIFICANT CHANGE UP (ref 8.4–10.5)
CHLORIDE SERPL-SCNC: 107 MMOL/L — SIGNIFICANT CHANGE UP (ref 96–108)
CO2 SERPL-SCNC: 24 MMOL/L — SIGNIFICANT CHANGE UP (ref 22–31)
CREAT SERPL-MCNC: 1.48 MG/DL — HIGH (ref 0.5–1.3)
GLUCOSE SERPL-MCNC: 88 MG/DL — SIGNIFICANT CHANGE UP (ref 70–99)
HCT VFR BLD CALC: 28.7 % — LOW (ref 34.5–45)
HGB BLD-MCNC: 8.7 G/DL — LOW (ref 11.5–15.5)
MCHC RBC-ENTMCNC: 28.4 PG — SIGNIFICANT CHANGE UP (ref 27–34)
MCHC RBC-ENTMCNC: 30.3 GM/DL — LOW (ref 32–36)
MCV RBC AUTO: 93.8 FL — SIGNIFICANT CHANGE UP (ref 80–100)
PLATELET # BLD AUTO: 410 K/UL — HIGH (ref 150–400)
POTASSIUM SERPL-MCNC: 4 MMOL/L — SIGNIFICANT CHANGE UP (ref 3.5–5.3)
POTASSIUM SERPL-SCNC: 4 MMOL/L — SIGNIFICANT CHANGE UP (ref 3.5–5.3)
RBC # BLD: 3.06 M/UL — LOW (ref 3.8–5.2)
RBC # FLD: 16.2 % — HIGH (ref 10.3–14.5)
SODIUM SERPL-SCNC: 143 MMOL/L — SIGNIFICANT CHANGE UP (ref 135–145)
WBC # BLD: 5.26 K/UL — SIGNIFICANT CHANGE UP (ref 3.8–10.5)
WBC # FLD AUTO: 5.26 K/UL — SIGNIFICANT CHANGE UP (ref 3.8–10.5)

## 2019-07-13 PROCEDURE — C1776: CPT

## 2019-07-13 PROCEDURE — 81001 URINALYSIS AUTO W/SCOPE: CPT

## 2019-07-13 PROCEDURE — 82248 BILIRUBIN DIRECT: CPT

## 2019-07-13 PROCEDURE — A9585: CPT

## 2019-07-13 PROCEDURE — 80048 BASIC METABOLIC PNL TOTAL CA: CPT

## 2019-07-13 PROCEDURE — 96376 TX/PRO/DX INJ SAME DRUG ADON: CPT

## 2019-07-13 PROCEDURE — 97167 OT EVAL HIGH COMPLEX 60 MIN: CPT

## 2019-07-13 PROCEDURE — 73502 X-RAY EXAM HIP UNI 2-3 VIEWS: CPT

## 2019-07-13 PROCEDURE — 97110 THERAPEUTIC EXERCISES: CPT

## 2019-07-13 PROCEDURE — 80061 LIPID PANEL: CPT

## 2019-07-13 PROCEDURE — 83615 LACTATE (LD) (LDH) ENZYME: CPT

## 2019-07-13 PROCEDURE — 99285 EMERGENCY DEPT VISIT HI MDM: CPT | Mod: 25

## 2019-07-13 PROCEDURE — 97164 PT RE-EVAL EST PLAN CARE: CPT

## 2019-07-13 PROCEDURE — 76882 US LMTD JT/FCL EVL NVASC XTR: CPT

## 2019-07-13 PROCEDURE — 76377 3D RENDER W/INTRP POSTPROCES: CPT

## 2019-07-13 PROCEDURE — 72192 CT PELVIS W/O DYE: CPT

## 2019-07-13 PROCEDURE — 96374 THER/PROPH/DIAG INJ IV PUSH: CPT

## 2019-07-13 PROCEDURE — 70544 MR ANGIOGRAPHY HEAD W/O DYE: CPT

## 2019-07-13 PROCEDURE — 73560 X-RAY EXAM OF KNEE 1 OR 2: CPT

## 2019-07-13 PROCEDURE — 73590 X-RAY EXAM OF LOWER LEG: CPT

## 2019-07-13 PROCEDURE — 88311 DECALCIFY TISSUE: CPT

## 2019-07-13 PROCEDURE — 93306 TTE W/DOPPLER COMPLETE: CPT

## 2019-07-13 PROCEDURE — 71045 X-RAY EXAM CHEST 1 VIEW: CPT

## 2019-07-13 PROCEDURE — 83010 ASSAY OF HAPTOGLOBIN QUANT: CPT

## 2019-07-13 PROCEDURE — G0515: CPT

## 2019-07-13 PROCEDURE — 87040 BLOOD CULTURE FOR BACTERIA: CPT

## 2019-07-13 PROCEDURE — 96375 TX/PRO/DX INJ NEW DRUG ADDON: CPT

## 2019-07-13 PROCEDURE — 86901 BLOOD TYPING SEROLOGIC RH(D): CPT

## 2019-07-13 PROCEDURE — 73552 X-RAY EXAM OF FEMUR 2/>: CPT

## 2019-07-13 PROCEDURE — 76000 FLUOROSCOPY <1 HR PHYS/QHP: CPT

## 2019-07-13 PROCEDURE — 82040 ASSAY OF SERUM ALBUMIN: CPT

## 2019-07-13 PROCEDURE — 85730 THROMBOPLASTIN TIME PARTIAL: CPT

## 2019-07-13 PROCEDURE — 97530 THERAPEUTIC ACTIVITIES: CPT

## 2019-07-13 PROCEDURE — 74176 CT ABD & PELVIS W/O CONTRAST: CPT

## 2019-07-13 PROCEDURE — 97162 PT EVAL MOD COMPLEX 30 MIN: CPT

## 2019-07-13 PROCEDURE — C1713: CPT

## 2019-07-13 PROCEDURE — 88305 TISSUE EXAM BY PATHOLOGIST: CPT

## 2019-07-13 PROCEDURE — 93005 ELECTROCARDIOGRAM TRACING: CPT

## 2019-07-13 PROCEDURE — 72170 X-RAY EXAM OF PELVIS: CPT

## 2019-07-13 PROCEDURE — 83735 ASSAY OF MAGNESIUM: CPT

## 2019-07-13 PROCEDURE — 82306 VITAMIN D 25 HYDROXY: CPT

## 2019-07-13 PROCEDURE — 70553 MRI BRAIN STEM W/O & W/DYE: CPT

## 2019-07-13 PROCEDURE — C1889: CPT

## 2019-07-13 PROCEDURE — 86900 BLOOD TYPING SEROLOGIC ABO: CPT

## 2019-07-13 PROCEDURE — 93970 EXTREMITY STUDY: CPT

## 2019-07-13 PROCEDURE — 86923 COMPATIBILITY TEST ELECTRIC: CPT

## 2019-07-13 PROCEDURE — P9016: CPT

## 2019-07-13 PROCEDURE — 93880 EXTRACRANIAL BILAT STUDY: CPT

## 2019-07-13 PROCEDURE — 85610 PROTHROMBIN TIME: CPT

## 2019-07-13 PROCEDURE — 86850 RBC ANTIBODY SCREEN: CPT

## 2019-07-13 PROCEDURE — 73564 X-RAY EXAM KNEE 4 OR MORE: CPT

## 2019-07-13 PROCEDURE — 85027 COMPLETE CBC AUTOMATED: CPT

## 2019-07-13 PROCEDURE — 80053 COMPREHEN METABOLIC PANEL: CPT

## 2019-07-13 PROCEDURE — 70549 MR ANGIOGRAPH NECK W/O&W/DYE: CPT

## 2019-07-13 PROCEDURE — 83036 HEMOGLOBIN GLYCOSYLATED A1C: CPT

## 2019-07-13 PROCEDURE — 97116 GAIT TRAINING THERAPY: CPT

## 2019-07-13 PROCEDURE — 36430 TRANSFUSION BLD/BLD COMPNT: CPT

## 2019-07-13 PROCEDURE — 84550 ASSAY OF BLOOD/URIC ACID: CPT

## 2019-07-13 PROCEDURE — 97535 SELF CARE MNGMENT TRAINING: CPT

## 2019-07-13 PROCEDURE — 70450 CT HEAD/BRAIN W/O DYE: CPT

## 2019-07-13 PROCEDURE — 84100 ASSAY OF PHOSPHORUS: CPT

## 2019-07-13 PROCEDURE — 82272 OCCULT BLD FECES 1-3 TESTS: CPT

## 2019-07-13 RX ORDER — APIXABAN 2.5 MG/1
5 TABLET, FILM COATED ORAL
Refills: 0 | Status: DISCONTINUED | OUTPATIENT
Start: 2019-07-13 | End: 2019-07-13

## 2019-07-13 RX ADMIN — ZINC OXIDE 1 APPLICATION(S): 200 OINTMENT TOPICAL at 07:00

## 2019-07-13 RX ADMIN — APIXABAN 5 MILLIGRAM(S): 2.5 TABLET, FILM COATED ORAL at 11:08

## 2019-07-13 RX ADMIN — Medication 325 MILLIGRAM(S): at 11:09

## 2019-07-13 RX ADMIN — Medication 5 MILLIGRAM(S): at 11:09

## 2019-07-13 RX ADMIN — PANTOPRAZOLE SODIUM 40 MILLIGRAM(S): 20 TABLET, DELAYED RELEASE ORAL at 06:05

## 2019-07-13 RX ADMIN — Medication 100 MILLIGRAM(S): at 06:05

## 2019-07-13 RX ADMIN — LOSARTAN POTASSIUM 100 MILLIGRAM(S): 100 TABLET, FILM COATED ORAL at 06:05

## 2019-07-13 NOTE — PROGRESS NOTE ADULT - PROBLEM SELECTOR PROBLEM 1
Hip fracture

## 2019-07-13 NOTE — PROGRESS NOTE ADULT - PROBLEM SELECTOR PLAN 1
continue pain meds as needed   encourage participation with physical therapy   Pain well controlled  will benefit from inpatient rehab
continue pain meds as needed   physical therapy as tolerated  Pain well controlled
tolerated procedure well. continue physical therapy  pain meds as needed  DVT and GI prophylaxis  swelling in right leg may be accumulation of blood
continue pain meds as needed   encourage participation with physical therapy   Pain well controlled  will benefit from inpatient rehab
continue pain meds as needed   encourage participation with physical therapy   Pain well controlled  will benefit from inpatient rehab
continue pain meds as needed   physical therapy as tolerated
continue pain meds as needed   physical therapy as tolerated
continue pain meds as needed   physical therapy as tolerated  Pain well controlled
continue pain meds as needed   physical therapy as tolerated  Pain well controlled  will benefit from inpatient rehab
tolerated procedure well. continue physical therapy  pain meds as needed  DVT and GI prophylaxis
tolerated procedure well. continue physical therapy  pain meds as needed  DVT and GI prophylaxis
tolerated procedure well. continue physical therapy  pain meds as needed  DVT and GI prophylaxis  swelling in right leg may be accumulation of blood
tolerated procedure well. continue physical therapy  pain meds as needed  DVT and GI prophylaxis  swelling in right leg may be accumulation of blood  continue to follow H&H

## 2019-07-13 NOTE — PROGRESS NOTE ADULT - PROVIDER SPECIALTY LIST ADULT
Gastroenterology
Internal Medicine
Neurology
Orthopedics
Vascular Surgery
Neurology
Internal Medicine

## 2019-07-13 NOTE — PROGRESS NOTE ADULT - SUBJECTIVE AND OBJECTIVE BOX
Patient is a 89y old  Female who presents with a chief complaint of hip fracture.  s/p mech fall in the kitchen onto R hip when right leg gave way, now c/o severe right hip pain and inability to ambulate.  Patient denies headstrike or LOC. Localizes pain to right hip/femur/knee. Patient denies radiation of pain. Patient denies numbness/tingling/burning in the RLE. No other bone/joint complaints. Patient is a community ambulator at baseline with a rolling walker. Patient has no issues w/ ADLs/IADLs. The patient underwent a right hemiarthroplasty ORIF surgery on 6/22/2019. When last seen on the morning of 6/23/19, she is wanted to be out of bed to chair and begin ambulation. She had an uneventful day until 6:30 PM when she noted weakness of her left hand and altered ability to move her fingers.  Her daughter noticed facial weakness and slurring of words.  No staff was notified and the patient remained this way until next examined by physical therapy on the morning of 6/24/19.  She was found to have  significant left hemiparesis of the left arm greater than left leg, with facial droop and difficulty articulating.  The patient was alert and oriented x3 and she answered questions appropriately.  She had clear weakness in the left arm and left leg as compared to the day before.  EKG was obtained and she was in normal sinus rhythm,  with no ST-T wave changes. Blood pressure was low between 100- 120 systolic.  Blood pressure medications were held, unless blood pressure was greater than 130 systolic.  The patient had a neurological consultation and  right brain CVA was suspected.  The patient underwent CT of the brain at this time showed no hemorrhages or masses.  There was no hydrocephalus.  Possibility of infarction was present and an MRI was advised.  After the CT scan of the brain the patient received 325 mg of aspirin.  It was now approximately 16 hours after the onset of left hemiparesis.  The patient was transferred to a monitored bed to rule out  episodic paroxysmal atrial fibrillation that could have caused her  acute CVA.  The patient had previously been on 2.5 mg Eliquis twice a day and dosage wasnow increased to 5 mg twice a day for therapeutic anticoagulation.  Her rhythm has remained  NSR with a pulse of 64.  The patient has no pain and with the exception of left-sided weakness, has no changes in her condition.  She complains bitterly of right leg postoperative pain  The patient has no new medical complaints at this time. 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  The patient and daughter were fully  informed of right carotid critical stenosis. Telemetry continues with NSR  and no arrhythmias. Patient seen resting comfortably. speech is minimally slurred and Patient is moving all extremities. Patient found to have worsening anemia. 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 GI states that oral anticoagulation can now be restarted. Pt states she has been feeling well denies any pain.  H/H stable  To be transferred to [facility] rehab center in am      MEDICATIONS  (STANDING):  acetaminophen   Tablet .. 975 milliGRAM(s) Oral every 8 hours  amLODIPine   Tablet 5 milliGRAM(s) Oral daily  aspirin 325 milliGRAM(s) Oral daily  docusate sodium 100 milliGRAM(s) Oral three times a day  heparin  Infusion 1000 Unit(s)/Hr (10 mL/Hr) IV Continuous <Continuous>  lidocaine   Patch 1 Patch Transdermal every 24 hours  losartan 100 milliGRAM(s) Oral daily  oxybutynin 5 milliGRAM(s) Oral daily  pantoprazole    Tablet 40 milliGRAM(s) Oral two times a day  polyethylene glycol 3350 17 Gram(s) Oral daily  senna 2 Tablet(s) Oral at bedtime  simvastatin 20 milliGRAM(s) Oral at bedtime  zinc oxide 20% Ointment 1 Application(s) Topical two times a day    MEDICATIONS  (PRN):  bisacodyl Suppository 10 milliGRAM(s) Rectal daily PRN If no bowel movement  magnesium hydroxide Suspension 30 milliLiter(s) Oral daily PRN Constipation  ondansetron Injectable 4 milliGRAM(s) IV Push every 6 hours PRN Nausea and/or Vomiting      PHYSICAL EXAM:  GENERAL: NAD, well nourished and conversant  HEAD:  Atraumatic  EYES: EOM, PERRLA, conjunctiva pink and sclera white  ENT: No tonsillar erythema, exudates, or enlargement, moist mucous membranes, good dentition, no lesions  NECK: Supple, No JVD, normal thyroid, carotids with normal upstrokes and no bruits  CHEST/LUNG: Clear to auscultation bilaterally, No rales, rhonchi, wheezing, or rubs  HEART: Regular rate and rhythm, No murmurs, rubs, or gallops  ABDOMEN: Soft, nondistended, no masses, guarding, tenderness or rebound, bowel sounds present  EXTREMITIES:  swelling of right LE  right groins          LYMPH: No lymphadenopathy noted  SKIN: No rashes or lesions  NERVOUS SYSTEM:  Alert & Oriented X3, normal cognitive function. Motor Strength 5/5 right upper and right lower.  5/5 left upper and left lower extremities, DTRs 2+ intact and symmetric    Vital Signs Last 24 Hrs  T(C): 36.4 (12 Jul 2019 11:09), Max: 36.6 (11 Jul 2019 21:16)  T(F): 97.5 (12 Jul 2019 11:09), Max: 97.9 (12 Jul 2019 01:00)  HR: 63 (12 Jul 2019 11:09) (60 - 67)  BP: 147/70 (12 Jul 2019 11:09) (107/63 - 147/70)  BP(mean): --  RR: 18 (12 Jul 2019 11:09) (17 - 18)  SpO2: 99% (12 Jul 2019 11:09) (96% - 99%)        LABS:          CBC Full  -  ( 12 Jul 2019 09:55 )  WBC Count : 5.34 K/uL  RBC Count : 2.88 M/uL  Hemoglobin : 8.4 g/dL  Hematocrit : 26.7 %  Platelet Count - Automated : 406 K/uL  Mean Cell Volume : 92.7 fl  Mean Cell Hemoglobin : 29.2 pg  Mean Cell Hemoglobin Concentration : 31.5 gm/dL  Auto Neutrophil # : x  Auto Lymphocyte # : x  Auto Monocyte # : x  Auto Eosinophil # : x  Auto Basophil # : x  Auto Neutrophil % : x  Auto Lymphocyte % : x  Auto Monocyte % : x  Auto Eosinophil % : x  Auto Basophil % : x    07-12    142  |  110<H>  |  32<H>  ----------------------------<  92  4.0   |  22  |  1.51<H>    Ca    9.7      12 Jul 2019 07:20        PTT - ( 11 Jul 2019 14:27 )  PTT:92.7 sec      CBC Full  -  ( 11 Jul 2019 06:24 )  WBC Count : 5.8 K/uL  RBC Count : 2.82 M/uL  Hemoglobin : 8.6 g/dL  Hematocrit : 25.5 %  Platelet Count - Automated : 396 K/uL  Mean Cell Volume : 90.2 fl  Mean Cell Hemoglobin : 30.6 pg  Mean Cell Hemoglobin Concentration : 33.9 gm/dL  Auto Neutrophil # : x  Auto Lymphocyte # : x  Auto Monocyte # : x  Auto Eosinophil # : x  Auto Basophil # : x  Auto Neutrophil % : x  Auto Lymphocyte % : x  Auto Monocyte % : x  Auto Eosinophil % : x  Auto Basophil % : x    07-11    145  |  111<H>  |  33<H>  ----------------------------<  91  4.2   |  20<L>  |  1.47<H>    Ca    8.9      11 Jul 2019 06:24        PTT - ( 11 Jul 2019 06:24 )  PTT:80.3 sec    CAPILLARY BLOOD GLUCOSE          RADIOLOGY & ADDITIONAL TESTS: Patient is a 89y old  Female who presents with a chief complaint of hip fracture.  s/p mech fall in the kitchen onto R hip when right leg gave way, now c/o severe right hip pain and inability to ambulate.  Patient denies headstrike or LOC. Localizes pain to right hip/femur/knee. Patient denies radiation of pain. Patient denies numbness/tingling/burning in the RLE. No other bone/joint complaints. Patient is a community ambulator at baseline with a rolling walker. Patient has no issues w/ ADLs/IADLs. The patient underwent a right hemiarthroplasty ORIF surgery on 6/22/2019. When last seen on the morning of 6/23/19, she is wanted to be out of bed to chair and begin ambulation. She had an uneventful day until 6:30 PM when she noted weakness of her left hand and altered ability to move her fingers.  Her daughter noticed facial weakness and slurring of words.  No staff was notified and the patient remained this way until next examined by physical therapy on the morning of 6/24/19.  She was found to have  significant left hemiparesis of the left arm greater than left leg, with facial droop and difficulty articulating.  The patient was alert and oriented x3 and she answered questions appropriately.  She had clear weakness in the left arm and left leg as compared to the day before.  EKG was obtained and she was in normal sinus rhythm,  with no ST-T wave changes. Blood pressure was low between 100- 120 systolic.  Blood pressure medications were held, unless blood pressure was greater than 130 systolic.  The patient had a neurological consultation and  right brain CVA was suspected.  The patient underwent CT of the brain at this time showed no hemorrhages or masses.  There was no hydrocephalus.  Possibility of infarction was present and an MRI was advised.  After the CT scan of the brain the patient received 325 mg of aspirin.  It was now approximately 16 hours after the onset of left hemiparesis.  The patient was transferred to a monitored bed to rule out  episodic paroxysmal atrial fibrillation that could have caused her  acute CVA.  The patient had previously been on 2.5 mg Eliquis twice a day and dosage wasnow increased to 5 mg twice a day for therapeutic anticoagulation.  Her rhythm has remained  NSR with a pulse of 64.  The patient has no pain and with the exception of left-sided weakness, has no changes in her condition.  She complains bitterly of right leg postoperative pain  The patient has no new medical complaints at this time. 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  The patient and daughter were fully  informed of right carotid critical stenosis. Telemetry continues with NSR  and no arrhythmias. Patient seen resting comfortably. speech is minimally slurred and Patient is moving all extremities. Patient found to have worsening anemia. 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 GI states that oral anticoagulation can now be restarted. Pt states she has been feeling well denies any pain.  H/H stable  To be transferred to [facility] rehab center upon bed availability      MEDICATIONS  (STANDING):  acetaminophen   Tablet .. 975 milliGRAM(s) Oral every 8 hours  amLODIPine   Tablet 5 milliGRAM(s) Oral daily  aspirin 325 milliGRAM(s) Oral daily  docusate sodium 100 milliGRAM(s) Oral three times a day  lidocaine   Patch 1 Patch Transdermal every 24 hours  losartan 100 milliGRAM(s) Oral daily  oxybutynin 5 milliGRAM(s) Oral daily  pantoprazole    Tablet 40 milliGRAM(s) Oral two times a day  polyethylene glycol 3350 17 Gram(s) Oral daily  senna 2 Tablet(s) Oral at bedtime  simvastatin 20 milliGRAM(s) Oral at bedtime  zinc oxide 20% Ointment 1 Application(s) Topical two times a day    MEDICATIONS  (PRN):  bisacodyl Suppository 10 milliGRAM(s) Rectal daily PRN If no bowel movement  magnesium hydroxide Suspension 30 milliLiter(s) Oral daily PRN Constipation  ondansetron Injectable 4 milliGRAM(s) IV Push every 6 hours PRN Nausea and/or Vomiting    Vital Signs Last 24 Hrs  T(C): 36.4 (13 Jul 2019 04:00), Max: 36.6 (12 Jul 2019 21:11)  T(F): 97.6 (13 Jul 2019 04:00), Max: 97.9 (12 Jul 2019 21:11)  HR: 65 (13 Jul 2019 04:00) (60 - 65)  BP: 110/64 (13 Jul 2019 04:00) (110/64 - 147/70)  BP(mean): --  RR: 18 (13 Jul 2019 04:00) (18 - 18)  SpO2: 97% (13 Jul 2019 04:00) (97% - 99%)    PHYSICAL EXAM:  GENERAL: NAD, well nourished and conversant  HEAD:  Atraumatic  EYES: EOM, PERRLA, conjunctiva pink and sclera white  ENT: No tonsillar erythema, exudates, or enlargement, moist mucous membranes, good dentition, no lesions  NECK: Supple, No JVD, normal thyroid, carotids with normal upstrokes and no bruits  CHEST/LUNG: Clear to auscultation bilaterally, No rales, rhonchi, wheezing, or rubs  HEART: Regular rate and rhythm, No murmurs, rubs, or gallops  ABDOMEN: Soft, nondistended, no masses, guarding, tenderness or rebound, bowel sounds present  EXTREMITIES:  swelling of right LE  right groins          LYMPH: No lymphadenopathy noted  SKIN: No rashes or lesions  NERVOUS SYSTEM:  Alert & Oriented X3, normal cognitive function. Motor Strength 5/5 right upper and right lower.  5/5 left upper and left lower extremities, DTRs 2+ intact and symmetric. left arm and leg have regained 5/5 motor function after CVA. Right hip weak after ORIF and to proceed with rehab transfer after right hip ORIF.      LABS:          CBC Full  -  ( 12 Jul 2019 09:55 )  WBC Count : 5.34 K/uL  RBC Count : 2.88 M/uL  Hemoglobin : 8.4 g/dL  Hematocrit : 26.7 %  Platelet Count - Automated : 406 K/uL  Mean Cell Volume : 92.7 fl  Mean Cell Hemoglobin : 29.2 pg  Mean Cell Hemoglobin Concentration : 31.5 gm/dL  Auto Neutrophil # : x  Auto Lymphocyte # : x  Auto Monocyte # : x  Auto Eosinophil # : x  Auto Basophil # : x  Auto Neutrophil % : x  Auto Lymphocyte % : x  Auto Monocyte % : x  Auto Eosinophil % : x  Auto Basophil % : x    07-12    142  |  110<H>  |  32<H>  ----------------------------<  92  4.0   |  22  |  1.51<H>    Ca    9.7      12 Jul 2019 07:20        PTT - ( 11 Jul 2019 14:27 )  PTT:92.7 sec

## 2019-07-13 NOTE — PROGRESS NOTE ADULT - PROBLEM SELECTOR PLAN 3
Patient recovering well   regaining movement in left Upper and lower extremities
Patient recovering well   regaining movement in left Upper and lower extremities  Eliquis held will eventually restart
Patient recovering well   regaining movement in left Upper and lower extremities  restart DOAC
Patient recovering well   regaining movement in left Upper and lower extremities
Patient recovering well   regaining movement in left Upper and lower extremities
Patient recovering well   regaining movement in left Upper and lower extremities  Eliquis held will  need to restart
Patient recovering well   regaining movement in left Upper and lower extremities  Eliquis held will eventually restart
Patient recovering well   regaining movement in left Upper and lower extremities  restart DOAC
Patient recovering well   regaining movement in left Upper and lower extremities  restart DOAC
Patient recovering well   regaining movement in left Upper and lower extremities  resume physical therapy
Patient recovering well   regaining movement in left Upper and lower extremities  will dc eliquis and start heparin with EGD next week for GI bleed
Patient recovering well   regaining movement in left Upper and lower extremities  will dc eliquis and start heparin with EGD next week for GI bleed
Patient recovering well   regaining movment in left Upper and lower extremities

## 2019-07-13 NOTE — PROGRESS NOTE ADULT - PROBLEM SELECTOR PLAN 2
currently controlled  continue amlodipine and losartan

## 2019-07-13 NOTE — PROGRESS NOTE ADULT - PROBLEM SELECTOR PROBLEM 5
Anemia due to blood loss

## 2019-07-13 NOTE — PROGRESS NOTE ADULT - REASON FOR ADMISSION
hip fracture

## 2019-07-13 NOTE — PROGRESS NOTE ADULT - ASSESSMENT
Hb fluctuating but stable overall, no melena or BRBPR  follow CBC, PPI therapy  ok for d/c from GI perspective

## 2019-07-13 NOTE — PROGRESS NOTE ADULT - PROBLEM SELECTOR PLAN 4
Patient agreeable to [facility]  vascular surgery following
as per vascular will follow up after rehab with vascular surgery
as per vascular will follow up after rehab with vascular surgery
Patient agreeable to [facility]  vascular surgery following
Patient agreeable to [facility]  vascular surgery following
Patient agreeable to [facility]  vascular surgery following  D/C to rehab to improve stamina and then have Carotid endarteriectomy
as per vascular will follow up after rehab with vascular surgery

## 2019-07-13 NOTE — PROGRESS NOTE ADULT - SUBJECTIVE AND OBJECTIVE BOX
INTERVAL HPI/OVERNIGHT EVENTS:    MEDICATIONS  (STANDING):  acetaminophen   Tablet .. 975 milliGRAM(s) Oral every 8 hours  amLODIPine   Tablet 5 milliGRAM(s) Oral daily  aspirin 325 milliGRAM(s) Oral daily  docusate sodium 100 milliGRAM(s) Oral three times a day  lidocaine   Patch 1 Patch Transdermal every 24 hours  losartan 100 milliGRAM(s) Oral daily  oxybutynin 5 milliGRAM(s) Oral daily  pantoprazole    Tablet 40 milliGRAM(s) Oral two times a day  polyethylene glycol 3350 17 Gram(s) Oral daily  senna 2 Tablet(s) Oral at bedtime  simvastatin 20 milliGRAM(s) Oral at bedtime  zinc oxide 20% Ointment 1 Application(s) Topical two times a day    MEDICATIONS  (PRN):  bisacodyl Suppository 10 milliGRAM(s) Rectal daily PRN If no bowel movement  magnesium hydroxide Suspension 30 milliLiter(s) Oral daily PRN Constipation  ondansetron Injectable 4 milliGRAM(s) IV Push every 6 hours PRN Nausea and/or Vomiting      Allergies    No Known Drug Allergies  Peaches (Rash)    Intolerances            PHYSICAL EXAM:   Vital Signs:  Vital Signs Last 24 Hrs  T(C): 36.4 (2019 04:00), Max: 36.6 (2019 21:11)  T(F): 97.6 (2019 04:00), Max: 97.9 (2019 21:11)  HR: 65 (2019 04:00) (60 - 65)  BP: 110/64 (2019 04:00) (110/64 - 147/70)  BP(mean): --  RR: 18 (2019 04:00) (18 - 18)  SpO2: 97% (2019 04:00) (97% - 99%)  Daily     Daily Weight in k.3 (2019 07:28)    GENERAL:  no distress  HEENT:  NC/AT,  anicteric  CHEST:   no increased effort, breath sounds clear  HEART:  Regular rhythm  ABDOMEN:  Soft, non-tender, non-distended, normoactive bowel sounds,  no masses ,no hepato-splenomegaly, no signs of chronic liver disease  EXTEREMITIES:  no cyanosis      LABS:                        8.4    5.34  )-----------( 406      ( 2019 09:55 )             26.7     07-13    143  |  107  |  30<H>  ----------------------------<  88  4.0   |  24  |  1.48<H>    Ca    10.0      2019 07:22      PTT - ( 2019 14:27 )  PTT:92.7 sec      RADIOLOGY & ADDITIONAL TESTS:

## 2019-07-13 NOTE — PROGRESS NOTE ADULT - ATTENDING COMMENTS
89F hx of Open reduction and internal fixation of hip. Patient s/p cerebrovascular accident due to carotid stenosis. Found to have guaiac positive stool. 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 89F hx of Open reduction and internal fixation of hip. Patient s/p cerebrovascular accident due to carotid stenosis. Found to have guaiac positive stool. 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 Cleared  for discharge to rehabilitation.  Full instructions provided regarding diagnosis, treatment, discharge medications, diet and follow up care on transfer sheet. Medically stable and for discharge to rehabilitation today as planned.

## 2019-07-13 NOTE — PROGRESS NOTE ADULT - PROBLEM SELECTOR PLAN 5
Patient s/p EGD  found to have two 2 mm angioectasias with stigmata of recent bleeding were found in the third part of the duodenum. Patient s/p cauterization
Patient s/p EGD  found to have two 2 mm angioectasias with stigmata of recent bleeding were found in the third part of the duodenum. Patient s/p cauterization. follow H&H and transfuse as needed
folloe H&H and transfuse as needed  awaiting stool for occult blood  possibly bleeding into right thight.   continue to monitor
+ dark stool  discussed with GI  will scope Patient next week  No contraindication to scheduled procedure   hold eliquis and start heparin
+ dark stool  discussed with GI  will scope Patient next week  hold eliquis and start heparin
Patient s/p EGD  found to have two 2 mm angioectasias with stigmata of recent bleeding were found in the third part of the duodenum. Patient s/p cauterization
Patient s/p EGD  found to have two 2 mm angioectasias with stigmata of recent bleeding were found in the third part of the duodenum. Patient s/p cauterization. follow H&H  transfuse as needed
Patient s/p EGD  found to have two 2 mm angioectasias with stigmata of recent bleeding were found in the third part of the duodenum. Patient s/p cauterization. follow H&H  transfuse as needed
Patient s/p EGD  found to have two 2 mm angioectasias with stigmata of recent bleeding were found in the third part of the duodenum. Patient s/p cauterization. follow H&H and transfuse as needed
folloe H&H and transfuse as needed  stool for occult blood Posi  possibly bleeding into right thight.   continue to monitor
follow H&H and transfuse as needed  stool for occult blood Positive  possibly bleeding into right thigh.   continue to monitor

## 2019-07-13 NOTE — PROGRESS NOTE ADULT - PROBLEM SELECTOR PROBLEM 4
Carotid stenosis

## 2019-07-13 NOTE — PROGRESS NOTE ADULT - PROBLEM SELECTOR PROBLEM 3
CVA (cerebral vascular accident)

## 2019-07-23 ENCOUNTER — INPATIENT (INPATIENT)
Facility: HOSPITAL | Age: 84
LOS: 7 days | Discharge: NURSING, REHAB OR LONG-TERM CARE | DRG: 379 | End: 2019-07-31
Attending: INTERNAL MEDICINE | Admitting: INTERNAL MEDICINE
Payer: MEDICARE

## 2019-07-23 VITALS
SYSTOLIC BLOOD PRESSURE: 111 MMHG | DIASTOLIC BLOOD PRESSURE: 63 MMHG | OXYGEN SATURATION: 95 % | RESPIRATION RATE: 18 BRPM | HEIGHT: 61 IN | TEMPERATURE: 98 F | WEIGHT: 141.1 LBS | HEART RATE: 76 BPM

## 2019-07-23 DIAGNOSIS — Z90.49 ACQUIRED ABSENCE OF OTHER SPECIFIED PARTS OF DIGESTIVE TRACT: Chronic | ICD-10-CM

## 2019-07-23 DIAGNOSIS — Z96.641 PRESENCE OF RIGHT ARTIFICIAL HIP JOINT: Chronic | ICD-10-CM

## 2019-07-23 DIAGNOSIS — M19.90 UNSPECIFIED OSTEOARTHRITIS, UNSPECIFIED SITE: ICD-10-CM

## 2019-07-23 DIAGNOSIS — I10 ESSENTIAL (PRIMARY) HYPERTENSION: ICD-10-CM

## 2019-07-23 DIAGNOSIS — E78.00 PURE HYPERCHOLESTEROLEMIA, UNSPECIFIED: ICD-10-CM

## 2019-07-23 DIAGNOSIS — K92.2 GASTROINTESTINAL HEMORRHAGE, UNSPECIFIED: ICD-10-CM

## 2019-07-23 DIAGNOSIS — R10.31 RIGHT LOWER QUADRANT PAIN: ICD-10-CM

## 2019-07-23 LAB
ALBUMIN SERPL ELPH-MCNC: 3.5 G/DL — SIGNIFICANT CHANGE UP (ref 3.3–5)
ALP SERPL-CCNC: 115 U/L — SIGNIFICANT CHANGE UP (ref 40–120)
ALT FLD-CCNC: 8 U/L — LOW (ref 10–45)
ANION GAP SERPL CALC-SCNC: 17 MMOL/L — SIGNIFICANT CHANGE UP (ref 5–17)
APTT BLD: 34.3 SEC — SIGNIFICANT CHANGE UP (ref 27.5–36.3)
AST SERPL-CCNC: 17 U/L — SIGNIFICANT CHANGE UP (ref 10–40)
BASOPHILS # BLD AUTO: 0.1 K/UL — SIGNIFICANT CHANGE UP (ref 0–0.2)
BASOPHILS NFR BLD AUTO: 1 % — SIGNIFICANT CHANGE UP (ref 0–2)
BILIRUB SERPL-MCNC: 0.1 MG/DL — LOW (ref 0.2–1.2)
BLD GP AB SCN SERPL QL: NEGATIVE — SIGNIFICANT CHANGE UP
BUN SERPL-MCNC: 37 MG/DL — HIGH (ref 7–23)
CALCIUM SERPL-MCNC: 10 MG/DL — SIGNIFICANT CHANGE UP (ref 8.4–10.5)
CHLORIDE SERPL-SCNC: 103 MMOL/L — SIGNIFICANT CHANGE UP (ref 96–108)
CO2 SERPL-SCNC: 19 MMOL/L — LOW (ref 22–31)
CREAT SERPL-MCNC: 1.72 MG/DL — HIGH (ref 0.5–1.3)
EOSINOPHIL # BLD AUTO: 0.2 K/UL — SIGNIFICANT CHANGE UP (ref 0–0.5)
EOSINOPHIL NFR BLD AUTO: 2.2 % — SIGNIFICANT CHANGE UP (ref 0–6)
GAS PNL BLDV: SIGNIFICANT CHANGE UP
GLUCOSE SERPL-MCNC: 117 MG/DL — HIGH (ref 70–99)
HCT VFR BLD CALC: 26.3 % — LOW (ref 34.5–45)
HGB BLD-MCNC: 9.7 G/DL — LOW (ref 11.5–15.5)
INR BLD: 1.35 RATIO — HIGH (ref 0.88–1.16)
LYMPHOCYTES # BLD AUTO: 2.3 K/UL — SIGNIFICANT CHANGE UP (ref 1–3.3)
LYMPHOCYTES # BLD AUTO: 32.7 % — SIGNIFICANT CHANGE UP (ref 13–44)
MAGNESIUM SERPL-MCNC: 2.3 MG/DL — SIGNIFICANT CHANGE UP (ref 1.6–2.6)
MCHC RBC-ENTMCNC: 33.3 PG — SIGNIFICANT CHANGE UP (ref 27–34)
MCHC RBC-ENTMCNC: 36.9 GM/DL — HIGH (ref 32–36)
MCV RBC AUTO: 90.3 FL — SIGNIFICANT CHANGE UP (ref 80–100)
MONOCYTES # BLD AUTO: 0.6 K/UL — SIGNIFICANT CHANGE UP (ref 0–0.9)
MONOCYTES NFR BLD AUTO: 8.5 % — SIGNIFICANT CHANGE UP (ref 2–14)
NEUTROPHILS # BLD AUTO: 3.9 K/UL — SIGNIFICANT CHANGE UP (ref 1.8–7.4)
NEUTROPHILS NFR BLD AUTO: 55.6 % — SIGNIFICANT CHANGE UP (ref 43–77)
OB PNL STL: POSITIVE
PHOSPHATE SERPL-MCNC: 4.3 MG/DL — SIGNIFICANT CHANGE UP (ref 2.5–4.5)
PLATELET # BLD AUTO: 270 K/UL — SIGNIFICANT CHANGE UP (ref 150–400)
POTASSIUM SERPL-MCNC: 5 MMOL/L — SIGNIFICANT CHANGE UP (ref 3.5–5.3)
POTASSIUM SERPL-SCNC: 5 MMOL/L — SIGNIFICANT CHANGE UP (ref 3.5–5.3)
PROT SERPL-MCNC: 6.3 G/DL — SIGNIFICANT CHANGE UP (ref 6–8.3)
PROTHROM AB SERPL-ACNC: 15.5 SEC — HIGH (ref 10–12.9)
RBC # BLD: 2.92 M/UL — LOW (ref 3.8–5.2)
RBC # FLD: 15.6 % — HIGH (ref 10.3–14.5)
RH IG SCN BLD-IMP: POSITIVE — SIGNIFICANT CHANGE UP
SODIUM SERPL-SCNC: 137 MMOL/L — SIGNIFICANT CHANGE UP (ref 135–145)
WBC # BLD: 7 K/UL — SIGNIFICANT CHANGE UP (ref 3.8–10.5)
WBC # FLD AUTO: 7 K/UL — SIGNIFICANT CHANGE UP (ref 3.8–10.5)

## 2019-07-23 PROCEDURE — 99291 CRITICAL CARE FIRST HOUR: CPT

## 2019-07-23 PROCEDURE — 93010 ELECTROCARDIOGRAM REPORT: CPT

## 2019-07-23 RX ORDER — DOCUSATE SODIUM 100 MG
100 CAPSULE ORAL THREE TIMES A DAY
Refills: 0 | Status: DISCONTINUED | OUTPATIENT
Start: 2019-07-23 | End: 2019-07-31

## 2019-07-23 RX ORDER — POLYETHYLENE GLYCOL 3350 17 G/17G
17 POWDER, FOR SOLUTION ORAL DAILY
Refills: 0 | Status: DISCONTINUED | OUTPATIENT
Start: 2019-07-23 | End: 2019-07-31

## 2019-07-23 RX ORDER — DEXTROSE MONOHYDRATE, SODIUM CHLORIDE, AND POTASSIUM CHLORIDE 50; .745; 4.5 G/1000ML; G/1000ML; G/1000ML
1000 INJECTION, SOLUTION INTRAVENOUS
Refills: 0 | Status: DISCONTINUED | OUTPATIENT
Start: 2019-07-23 | End: 2019-07-27

## 2019-07-23 RX ORDER — AMLODIPINE BESYLATE 2.5 MG/1
5 TABLET ORAL DAILY
Refills: 0 | Status: DISCONTINUED | OUTPATIENT
Start: 2019-07-23 | End: 2019-07-31

## 2019-07-23 RX ORDER — ACETAMINOPHEN 500 MG
975 TABLET ORAL EVERY 8 HOURS
Refills: 0 | Status: DISCONTINUED | OUTPATIENT
Start: 2019-07-23 | End: 2019-07-31

## 2019-07-23 RX ORDER — SIMVASTATIN 20 MG/1
20 TABLET, FILM COATED ORAL AT BEDTIME
Refills: 0 | Status: DISCONTINUED | OUTPATIENT
Start: 2019-07-23 | End: 2019-07-31

## 2019-07-23 RX ORDER — PANTOPRAZOLE SODIUM 20 MG/1
80 TABLET, DELAYED RELEASE ORAL ONCE
Refills: 0 | Status: COMPLETED | OUTPATIENT
Start: 2019-07-23 | End: 2019-07-23

## 2019-07-23 RX ORDER — MIRTAZAPINE 45 MG/1
15 TABLET, ORALLY DISINTEGRATING ORAL AT BEDTIME
Refills: 0 | Status: DISCONTINUED | OUTPATIENT
Start: 2019-07-23 | End: 2019-07-31

## 2019-07-23 RX ORDER — PANTOPRAZOLE SODIUM 20 MG/1
40 TABLET, DELAYED RELEASE ORAL
Refills: 0 | Status: DISCONTINUED | OUTPATIENT
Start: 2019-07-23 | End: 2019-07-31

## 2019-07-23 RX ORDER — LOSARTAN POTASSIUM 100 MG/1
100 TABLET, FILM COATED ORAL DAILY
Refills: 0 | Status: DISCONTINUED | OUTPATIENT
Start: 2019-07-23 | End: 2019-07-31

## 2019-07-23 RX ADMIN — DEXTROSE MONOHYDRATE, SODIUM CHLORIDE, AND POTASSIUM CHLORIDE 50 MILLILITER(S): 50; .745; 4.5 INJECTION, SOLUTION INTRAVENOUS at 23:42

## 2019-07-23 RX ADMIN — PANTOPRAZOLE SODIUM 80 MILLIGRAM(S): 20 TABLET, DELAYED RELEASE ORAL at 16:59

## 2019-07-23 NOTE — PROGRESS NOTE ADULT - SUBJECTIVE AND OBJECTIVE BOX
Patient is a 89y old  Female who presents with a chief complaint of Bloody stool    HPI:    89 y/o female PMHz polio, HTN, HLD, Afib on Eliquis, s/p partial R side hip replacement 6/2019 c/b right sided CVA with LUE defecits and had GIB during admission requiring 4 units PRBCs brought in via EMS to the ED with melena starting today. Patient stated she had diffuse lower abdominal cramping this morning worsened prior to defecation and noticed she had melena. Patient felt like she was going to syncopize on her third bowel movement with dizziness nausea SOB lightheadedness and diaphoresis. Per daughter had blood work yesterday with hemoglobin 7.9 and has been downtrending since her discharge to [facility]. Patient denied CP, actual syncope, back pain, headache, weakness in extremity, N/V/D, continued abdominal pain, urinary complaints    Home Medications:  acetaminophen 325 mg oral tablet: 3 tab(s) orally every 8 hours for Minor pain (1-3) (24 Jun 2019 09:01)  amLODIPine 5 mg oral tablet: 1 tab(s) orally once a day (18 Nov 2016 10:31)  apixaban 2.5 mg oral tablet: 1 tab(s) orally every 12 hours (24 Jun 2019 09:01)  aspirin 325 mg oral tablet: 1 tab(s) orally once a day (03 Jul 2019 11:09)  docusate sodium 100 mg oral capsule: 1 cap(s) orally 3 times a day (24 Jun 2019 08:53)  losartan 100 mg oral tablet: 1 tab(s) orally once a day (18 Nov 2016 10:31)  mirtazapine 15 mg oral tablet: 1 tab(s) orally once a day (at bedtime), As needed, insomnia (18 Nov 2016 10:31)  pantoprazole 40 mg oral delayed release tablet: 1 tab(s) orally once a day (before a meal) (18 Nov 2016 10:31)  polyethylene glycol 3350 oral powder for reconstitution: 17 gram(s) orally once a day (24 Jun 2019 08:53)  simvastatin 20 mg oral tablet: 1 tab(s) orally once a day (at bedtime) (18 Nov 2016 10:31)  traMADol 50 mg oral tablet: 0.5 tab(s) orally every 4 hours, As needed, Moderate-Severe Pain (4 -10) (24 Jun 2019 09:01)    MEDICATIONS  (STANDING):    MEDICATIONS  (PRN):        Allergies    No Known Drug Allergies  Peaches (Rash)    Intolerances        REVIEW OF SYSTEM:  CONSTITUTIONAL: No fever, No change in weight, No fatigue Light headed and dizzy  HEAD: No headache, No dizziness, No recent trauma  EYES: No eye pain, No visual disturbances, No discharge  ENT:  No difficulty hearing, No tinnitus, No vertigo, No sinus pain, No throat pain  NECK: No pain, No stiffness  BREASTS: No pain, No masses, No nipple discharge  RESPIRATORY: No cough, No wheezing, No chills, No hemoptysis, No shortness of breath at rest or exertional shortness of breath  CARDIOVASCULAR: No chest pain, No palpitations, No dizziness, No CHF, No arrhythmia, No cardiomegaly, No leg swelling  GASTROINTESTINAL: No abdominal, No epigastric pain. No nausea, No vomiting, No hematemesis, No diarrhea, No constipation. No melena, No hematochezia. No GERD  Bloody stools  GENITOURINARY: No dysuria, No frequency, No hematuria, No incontinence, No nocturia, No hesitancy,  right groin pain   SKIN: No itching, No burning, No rashes, No lesions   LYMPH NODES: No history of enlarged glands  ENDOCRINE: No heat or cold intolerance, No hair loss. No osteoporosis, No thyroid disease  MUSCULOSKELETAL: No joint pain or swelling, No muscle, back, or extremity pain  PSYCHIATRIC: No depression, No anxiety, No mood swings, No difficulty sleeping  HEME/LYMPH: No easy bruising, No anticoagulants, No bleeding disorder, No bleeding gums  ALLERGY AND IMMUNOLOGIC: No hives, No eczema  NEUROLOGICAL: No memory loss, No loss of strength, No numbness, No tremors        VITALS:   T(C): 36.7 (07-23-19 @ 19:37), Max: 37 (07-23-19 @ 16:57)  HR: 74 (07-23-19 @ 19:37) (74 - 80)  BP: 124/84 (07-23-19 @ 19:37) (111/53 - 124/84)  RR: 15 (07-23-19 @ 19:37) (15 - 18)  SpO2: 98% (07-23-19 @ 19:37) (95% - 98%)  Wt(kg): --      PHYSICAL EXAM:  GENERAL: NAD, well nourished and conversant  HEAD:  Atraumatic  EYES: EOM, PERRLA, conjunctiva pale and sclera white  ENT: No tonsillar erythema, exudates, or enlargement, moist mucous membranes, good dentition, no lesions  NECK: Supple, No JVD, normal thyroid, carotids with normal upstrokes and no bruits  CHEST/LUNG: Clear to auscultation bilaterally, No rales, rhonchi, wheezing, or rubs  HEART: Regular rate and rhythm, No murmurs, rubs, or gallops  ABDOMEN: Soft, nondistended, no masses, guarding, tenderness or rebound, bowel sounds present  EXTREMITIES:  2+ Peripheral Pulses, No clubbing, cyanosis, or edema. No arthritis of shoulders, elbows, hands, hips, knees, ankles, or feet. No DJD C spine, T spine, or L/S spine  RIGHT groin pain   LYMPH: No lymphadenopathy noted  SKIN: No rashes or lesions  NERVOUS SYSTEM:  Alert & Oriented X3, normal cognitive function. Motor Strength 5/5 right upper and right lower.  5/5 left upper and left lower extremities, DTRs 2+ intact and symmetric    LABS:                          9.7    7.0   )-----------( 270      ( 23 Jul 2019 16:45 )             26.3     07-23    137  |  103  |  37<H>  ----------------------------<  117<H>  5.0   |  19<L>  |  1.72<H>  07-22    136  |  102  |  49<H>  ----------------------------<  90bloody stool  4.7   |  23  |  1.69<H>    Ca    10.0      23 Jul 2019 16:45  Ca    9.2      22 Jul 2019 07:19  Phos  4.3     07-23  Mg     2.3     07-23    TPro  6.3  /  Alb  3.5  /  TBili  0.1<L>  /  DBili  x   /  AST  17  /  ALT  8<L>  /  AlkPhos  115  07-23    CAPILLARY BLOOD GLUCOSE      POCT Blood Glucose.: 117 mg/dL (23 Jul 2019 16:40)      RADIOLOGY & ADDITIONAL TESTS:      Consultant(s):    Care Discussed with Consultants/Other Providers [ ] YES  [ ] NO

## 2019-07-23 NOTE — H&P ADULT - ATTENDING COMMENTS
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 Patient stated she had diffuse lower abdominal cramping this morning worsened prior to defecation and noticed she had melena. Patient felt like she was going to syncopize on her third bowel movement with dizziness nausea SOB lightheadedness and diaphoresis. Per daughter had blood work yesterday with hemoglobin 7.9 and has been downtrending since her discharge to [facility]. Patient denied CP, actual syncope, back pain, headache, weakness in extremity, N/V/D, continued abdominal pain, urinary complaints

## 2019-07-23 NOTE — ED PROVIDER NOTE - PROGRESS NOTE DETAILS
HENRY Ray: paged Dr. Damian regarding admission awaiting call back HENRY Ray: discussed case with Dr. Mancini who accepted pt and recommended pt to Dr. Haley service. Dr. Mancini will make Dr. Damian aware of admission HENRY Ray: discussed case with Dr. Mancini who accepted pt and recommended pt to Dr. Haley service. Dr. Mancini will make Dr. Damian aware of admission. Blood consent obtained for PRBCs and placed in chart

## 2019-07-23 NOTE — H&P ADULT - NSHPLABSRESULTS_GEN_ALL_CORE
CBC Full  -  ( 23 Jul 2019 16:45 )  WBC Count : 7.0 K/uL  RBC Count : 2.92 M/uL  Hemoglobin : 9.7 g/dL  Hematocrit : 26.3 %  Platelet Count - Automated : 270 K/uL  Mean Cell Volume : 90.3 fl  Mean Cell Hemoglobin : 33.3 pg  Mean Cell Hemoglobin Concentration : 36.9 gm/dL  Auto Neutrophil # : 3.9 K/uL  Auto Lymphocyte # : 2.3 K/uL  Auto Monocyte # : 0.6 K/uL  Auto Eosinophil # : 0.2 K/uL  Auto Basophil # : 0.1 K/uL  Auto Neutrophil % : 55.6 %  Auto Lymphocyte % : 32.7 %  Auto Monocyte % : 8.5 %  Auto Eosinophil % : 2.2 %  Auto Basophil % : 1.0 %    07-23    137  |  103  |  37<H>  ----------------------------<  117<H>  5.0   |  19<L>  |  1.72<H>    Ca    10.0      23 Jul 2019 16:45  Phos  4.3     07-23  Mg     2.3     07-23    TPro  6.3  /  Alb  3.5  /  TBili  0.1<L>  /  DBili  x   /  AST  17  /  ALT  8<L>  /  AlkPhos  115  07-23    LIVER FUNCTIONS - ( 23 Jul 2019 16:45 )  Alb: 3.5 g/dL / Pro: 6.3 g/dL / ALK PHOS: 115 U/L / ALT: 8 U/L / AST: 17 U/L / GGT: x           PT/INR - ( 23 Jul 2019 16:45 )   PT: 15.5 sec;   INR: 1.35 ratio         PTT - ( 23 Jul 2019 16:45 )  PTT:34.3 sec

## 2019-07-23 NOTE — ED PROVIDER NOTE - PHYSICAL EXAMINATION
Neuro: decreased strength LUE 3/5 and decreased sensation toLUE  RLE slightly externally rotated and atrophied

## 2019-07-23 NOTE — H&P ADULT - NSHPREVIEWOFSYSTEMS_GEN_ALL_CORE
REVIEW OF SYSTEM:  CONSTITUTIONAL: No fever, No change in weight, No fatigue Light headed and dizzy  HEAD: No headache, No dizziness, No recent trauma  EYES: No eye pain, No visual disturbances, No discharge  ENT:  No difficulty hearing, No tinnitus, No vertigo, No sinus pain, No throat pain  NECK: No pain, No stiffness  BREASTS: No pain, No masses, No nipple discharge  RESPIRATORY: No cough, No wheezing, No chills, No hemoptysis, No shortness of breath at rest or exertional shortness of breath  CARDIOVASCULAR: No chest pain, No palpitations, No dizziness, No CHF, No arrhythmia, No cardiomegaly, No leg swelling  GASTROINTESTINAL: No abdominal, No epigastric pain. No nausea, No vomiting, No hematemesis, No diarrhea, No constipation. No melena, No hematochezia. No GERD  Bloody stools  GENITOURINARY: No dysuria, No frequency, No hematuria, No incontinence, No nocturia, No hesitancy,  right groin pain   SKIN: No itching, No burning, No rashes, No lesions   LYMPH NODES: No history of enlarged glands  ENDOCRINE: No heat or cold intolerance, No hair loss. No osteoporosis, No thyroid disease  MUSCULOSKELETAL: No joint pain or swelling, No muscle, back, or extremity pain  PSYCHIATRIC: No depression, No anxiety, No mood swings, No difficulty sleeping  HEME/LYMPH: No easy bruising, No anticoagulants, No bleeding disorder, No bleeding gums  ALLERGY AND IMMUNOLOGIC: No hives, No eczema  NEUROLOGICAL: No memory loss, No loss of strength, No numbness, No tremors

## 2019-07-23 NOTE — H&P ADULT - HISTORY OF PRESENT ILLNESS
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 Patient stated she had diffuse lower abdominal cramping this morning worsened prior to defecation and noticed she had melena. Patient felt like she was going to syncopize on her third bowel movement with dizziness nausea SOB lightheadedness and diaphoresis. Per daughter had blood work yesterday with hemoglobin 7.9 and has been downtrending since her discharge to [facility]. Patient denied CP, actual syncope, back pain, headache, weakness in extremity, N/V/D, continued abdominal pain, urinary complaints

## 2019-07-23 NOTE — H&P ADULT - NSHPPHYSICALEXAM_GEN_ALL_CORE
PHYSICAL EXAM:  GENERAL: NAD, well nourished and conversant  HEAD:  Atraumatic  EYES: EOM, PERRLA, conjunctiva pale and sclera white  ENT: No tonsillar erythema, exudates, or enlargement, moist mucous membranes, good dentition, no lesions  NECK: Supple, No JVD, normal thyroid, carotids with normal upstrokes and no bruits  CHEST/LUNG: Clear to auscultation bilaterally, No rales, rhonchi, wheezing, or rubs  HEART: Regular rate and rhythm, No murmurs, rubs, or gallops  ABDOMEN: Soft, nondistended, no masses, guarding, tenderness or rebound, bowel sounds present  EXTREMITIES:  2+ Peripheral Pulses, No clubbing, cyanosis, or edema. No arthritis of shoulders, elbows, hands, hips, knees, ankles, or feet. No DJD C spine, T spine, or L/S spine  RIGHT groin pain   LYMPH: No lymphadenopathy noted  SKIN: No rashes or lesions  NERVOUS SYSTEM:  Alert & Oriented X3, normal cognitive function. Motor Strength 5/5 right upper and right lower.  5/5 left upper and left lower extremities, DTRs 2+ intact and symmetric

## 2019-07-23 NOTE — ED PROVIDER NOTE - PMH
Gastrointestinal bleed    High cholesterol    Hypertension    Pleural effusion on left    Polio    Splenic abscess

## 2019-07-23 NOTE — ED PROVIDER NOTE - NSCAREINITIATED _GEN_ER
0745 Completed shift report and transfer of care from [facility], RN. We reviewed SBAR, labs, meds and plan of care for this pt  0830 Shift assessment completed  0915 [facility] a unit of blood (4 of 6) and stayed with pt for first 30 minutes, then checked q15 mins for first hour to assess for adverse reactions, none noted. 0930 Began gastric contrast  1045 Completed drinking contrast  1200 Pt went for CT and is back in room (without incident). Carlton Shoemaker RN began 5 of 6 blood transfusion. Reassessed pt  1400 Began last transfusion ordered. 1600 Transfusion complete and no reaction symptoms noted, reassessed pt. Pt reporting pain 6:10 in lower abdomen. No pain meds ordered, call to Dr. Annie Shah made for orders. Orders received, medicated by Carlton Shoemaker, RN  9827 Reassessed pt, eyes closed, respirations even and unlabored, vs stable. 1930 Completed shift report and transferred care to Raúl Corrales RN. We reviewed SBAR, labs, meds, and plan of care. David Tong(Attending)

## 2019-07-23 NOTE — ED ADULT NURSE NOTE - OBJECTIVE STATEMENT
Pt BIBA from [facility] for black stool.  Pt Pt BIBA from [facility] for black stool.  Pt with a fib on eliquis and recent hx of partial R hip replacement 06/19, had stroke with residual LUE weakness after hip surgery then GIB requiring 4 units PRBC.  This morning she began haing lower abdominal cramping and had bowel movemebt Pt BIBA from [facility] for black stool.  Pt with a fib on Eliquis and recent hx of partial R hip replacement 06/19, had stroke with residual LUE weakness after hip surgery then GIB requiring 4 units PRBC.  This morning she began having lower abdominal cramping and had black colored bowel movement with associated diaphoresis and lightheadedness, no syncope.  Pt is conversive, no active distress, no current abd pain, abd soft/nt/nd, skin wdi, denies cp, sob, lightheadedness, n/v/d, urinary symptoms.  Hx of polio with residual RLE weakness.

## 2019-07-23 NOTE — ED PROVIDER NOTE - CLINICAL SUMMARY MEDICAL DECISION MAKING FREE TEXT BOX
Info from pt and Daughter  89 y/o female PMHz polio, HTN, HLD, Afib on Eliquis, s/p partial R side hip replacement 6/2019 c/b right sided CVA with LUE defecits and had GIB during admission requiring 4 units PRBCs brought in via EMS to the ED with melena starting today. Patient stated she had diffuse lower abdominal cramping this morning worsened prior to defecation and noticed she had melena. Patient felt like she was going to syncopize on her third bowel movement with dizziness nausea SOB lightheadedness and diaphoresis. Per daughter had blood work yesterday with hemoglobin 7.9 and has been downtrending since her discharge to [facility]. Patient denied CP, actual syncope, back pain, headache, weakness in extremity, N/V/D, continued abdominal pain, urinary complaints O/E Alert pale No distress No JVD Heart Regular s1s2 Abdomen surgical scars epigastric tenderness CNS left hemiparesis RLE s/p polio wasting weakness surgical scar right hip   Concern for GIB on elliquis will arrange Labs protonix blood tx admission --Ran

## 2019-07-23 NOTE — ED PROVIDER NOTE - ATTENDING CONTRIBUTION TO CARE
I have seen and evaluated this patient with the [place] practice clinician.   I agree with the findings  unless other wise stated. I have amended notes where needed.  After my face to face bedside evaluation, I am notin89 y/o female PMHz polio, HTN, HLD, Afib on Eliquis, s/p partial R side hip replacement 2019 c/b right sided CVA with LUE defecits and had GIB during admission requiring 4 units PRBCs brought in via EMS to the ED with melena starting today. Patient stated she had diffuse lower abdominal cramping this morning worsened prior to defecation and noticed she had melena. Patient felt like she was going to syncopize on her third bowel movement with dizziness nausea SOB lightheadedness and diaphoresis. Per daughter had blood work yesterday with hemoglobin 7.9 and has been downtrending since her discharge to [facility]. Patient denied CP, actual syncope, back pain, headache, weakness in extremity, N/V/D, continued abdominal pain, urinary complaints. Info from pt and Daughter  89 y/o female PMHz polio, HTN, HLD, Afib on Eliquis, s/p partial R side hip replacement 2019 c/b right sided CVA with LUE defecits and had GIB during admission requiring 4 units PRBCs brought in via EMS to the ED with melena starting today. Patient stated she had diffuse lower abdominal cramping this morning worsened prior to defecation and noticed she had melena. Patient felt like she was going to syncopize on her third bowel movement with dizziness nausea SOB lightheadedness and diaphoresis. Per daughter had blood work yesterday with hemoglobin 7.9 and has been downtrending since her discharge to [facility]. Patient denied CP, actual syncope, back pain, headache, weakness in extremity, N/V/D, continued abdominal pain, urinary complaints O/E Alert pale No distress No JVD Heart Regular s1s2 Abdomen surgical scars epigastric tenderness CNS left hemiparesis RLE s/p polio wasting weakness surgical scar right hip   Concern for GIB on elliquis will arrange Labs protonix blood tx admission --Tong

## 2019-07-23 NOTE — ED PROVIDER NOTE - OBJECTIVE STATEMENT
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 Patient stated she had diffuse lower abdominal cramping this morning worsened prior to defecation and noticed she had melena. Patient felt like she was going to syncopize on her third bowel movement with dizziness nausea SOB lightheadedness and diaphoresis. Per daughter had blood work yesterday with hemoglobin 7.9 and has been downtrending since her discharge to [facility]. Patient denied CP, actual syncope, back pain, headache, weakness in extremity, N/V/D, continued abdominal pain, urinary complaints

## 2019-07-23 NOTE — PROGRESS NOTE ADULT - ASSESSMENT
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 Patient stated she had diffuse lower abdominal cramping this morning worsened prior to defecation and noticed she had melena. Patient felt like she was going to syncopize on her third bowel movement with dizziness nausea SOB lightheadedness and diaphoresis. Per daughter had blood work yesterday with hemoglobin 7.9 and has been downtrending since her discharge to [facility]. Patient denied CP, actual syncope, back pain, headache, weakness in extremity, N/V/D, continued abdominal pain, urinary complaints

## 2019-07-23 NOTE — H&P ADULT - ASSESSMENT
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 Patient stated she had diffuse lower abdominal cramping this morning worsened prior to defecation and noticed she had melena. Patient felt like she was going to syncopize on her third bowel movement with dizziness nausea SOB lightheadedness and diaphoresis. Per daughter had blood work yesterday with hemoglobin 7.9 and has been downtrending since her discharge to [facility]. Patient denied CP, actual syncope, back pain, headache, weakness in extremity, N/V/D, continued abdominal pain, urinary complaints

## 2019-07-23 NOTE — H&P ADULT - NSICDXPASTMEDICALHX_GEN_ALL_CORE_FT
PAST MEDICAL HISTORY:  Gastrointestinal bleed     High cholesterol     Hypertension     Pleural effusion on left     Polio     Splenic abscess

## 2019-07-24 DIAGNOSIS — I48.91 UNSPECIFIED ATRIAL FIBRILLATION: ICD-10-CM

## 2019-07-24 LAB
ALBUMIN SERPL ELPH-MCNC: 3.1 G/DL — LOW (ref 3.3–5)
ALP SERPL-CCNC: 95 U/L — SIGNIFICANT CHANGE UP (ref 40–120)
ALT FLD-CCNC: 7 U/L — LOW (ref 10–45)
ANION GAP SERPL CALC-SCNC: 12 MMOL/L — SIGNIFICANT CHANGE UP (ref 5–17)
AST SERPL-CCNC: 16 U/L — SIGNIFICANT CHANGE UP (ref 10–40)
BILIRUB SERPL-MCNC: 0.1 MG/DL — LOW (ref 0.2–1.2)
BUN SERPL-MCNC: 47 MG/DL — HIGH (ref 7–23)
CALCIUM SERPL-MCNC: 9.7 MG/DL — SIGNIFICANT CHANGE UP (ref 8.4–10.5)
CHLORIDE SERPL-SCNC: 107 MMOL/L — SIGNIFICANT CHANGE UP (ref 96–108)
CO2 SERPL-SCNC: 22 MMOL/L — SIGNIFICANT CHANGE UP (ref 22–31)
CREAT SERPL-MCNC: 1.65 MG/DL — HIGH (ref 0.5–1.3)
GLUCOSE SERPL-MCNC: 99 MG/DL — SIGNIFICANT CHANGE UP (ref 70–99)
HCT VFR BLD CALC: 28.2 % — LOW (ref 34.5–45)
HCT VFR BLD CALC: 29.8 % — LOW (ref 34.5–45)
HCT VFR BLD CALC: 30.9 % — LOW (ref 34.5–45)
HGB BLD-MCNC: 10.2 G/DL — LOW (ref 11.5–15.5)
HGB BLD-MCNC: 10.3 G/DL — LOW (ref 11.5–15.5)
HGB BLD-MCNC: 9.8 G/DL — LOW (ref 11.5–15.5)
MCHC RBC-ENTMCNC: 30.6 PG — SIGNIFICANT CHANGE UP (ref 27–34)
MCHC RBC-ENTMCNC: 31.7 PG — SIGNIFICANT CHANGE UP (ref 27–34)
MCHC RBC-ENTMCNC: 32.3 PG — SIGNIFICANT CHANGE UP (ref 27–34)
MCHC RBC-ENTMCNC: 33.5 GM/DL — SIGNIFICANT CHANGE UP (ref 32–36)
MCHC RBC-ENTMCNC: 34.3 GM/DL — SIGNIFICANT CHANGE UP (ref 32–36)
MCHC RBC-ENTMCNC: 34.8 GM/DL — SIGNIFICANT CHANGE UP (ref 32–36)
MCV RBC AUTO: 91.5 FL — SIGNIFICANT CHANGE UP (ref 80–100)
MCV RBC AUTO: 92.3 FL — SIGNIFICANT CHANGE UP (ref 80–100)
MCV RBC AUTO: 92.9 FL — SIGNIFICANT CHANGE UP (ref 80–100)
PLATELET # BLD AUTO: 208 K/UL — SIGNIFICANT CHANGE UP (ref 150–400)
PLATELET # BLD AUTO: 215 K/UL — SIGNIFICANT CHANGE UP (ref 150–400)
PLATELET # BLD AUTO: 250 K/UL — SIGNIFICANT CHANGE UP (ref 150–400)
POTASSIUM SERPL-MCNC: 4.5 MMOL/L — SIGNIFICANT CHANGE UP (ref 3.5–5.3)
POTASSIUM SERPL-SCNC: 4.5 MMOL/L — SIGNIFICANT CHANGE UP (ref 3.5–5.3)
PROT SERPL-MCNC: 5.9 G/DL — LOW (ref 6–8.3)
RBC # BLD: 3.04 M/UL — LOW (ref 3.8–5.2)
RBC # BLD: 3.23 M/UL — LOW (ref 3.8–5.2)
RBC # BLD: 3.38 M/UL — LOW (ref 3.8–5.2)
RBC # FLD: 15.6 % — HIGH (ref 10.3–14.5)
RBC # FLD: 15.7 % — HIGH (ref 10.3–14.5)
RBC # FLD: 15.7 % — HIGH (ref 10.3–14.5)
SODIUM SERPL-SCNC: 141 MMOL/L — SIGNIFICANT CHANGE UP (ref 135–145)
WBC # BLD: 4.7 K/UL — SIGNIFICANT CHANGE UP (ref 3.8–10.5)
WBC # BLD: 5 K/UL — SIGNIFICANT CHANGE UP (ref 3.8–10.5)
WBC # BLD: 6.5 K/UL — SIGNIFICANT CHANGE UP (ref 3.8–10.5)
WBC # FLD AUTO: 4.7 K/UL — SIGNIFICANT CHANGE UP (ref 3.8–10.5)
WBC # FLD AUTO: 5 K/UL — SIGNIFICANT CHANGE UP (ref 3.8–10.5)
WBC # FLD AUTO: 6.5 K/UL — SIGNIFICANT CHANGE UP (ref 3.8–10.5)

## 2019-07-24 PROCEDURE — 73502 X-RAY EXAM HIP UNI 2-3 VIEWS: CPT | Mod: 26,RT

## 2019-07-24 RX ORDER — TRAMADOL HYDROCHLORIDE 50 MG/1
25 TABLET ORAL ONCE
Refills: 0 | Status: DISCONTINUED | OUTPATIENT
Start: 2019-07-24 | End: 2019-07-24

## 2019-07-24 RX ADMIN — Medication 975 MILLIGRAM(S): at 22:41

## 2019-07-24 RX ADMIN — PANTOPRAZOLE SODIUM 40 MILLIGRAM(S): 20 TABLET, DELAYED RELEASE ORAL at 06:23

## 2019-07-24 RX ADMIN — Medication 100 MILLIGRAM(S): at 06:22

## 2019-07-24 RX ADMIN — DEXTROSE MONOHYDRATE, SODIUM CHLORIDE, AND POTASSIUM CHLORIDE 50 MILLILITER(S): 50; .745; 4.5 INJECTION, SOLUTION INTRAVENOUS at 16:16

## 2019-07-24 RX ADMIN — TRAMADOL HYDROCHLORIDE 25 MILLIGRAM(S): 50 TABLET ORAL at 03:20

## 2019-07-24 RX ADMIN — Medication 100 MILLIGRAM(S): at 22:41

## 2019-07-24 RX ADMIN — Medication 975 MILLIGRAM(S): at 14:01

## 2019-07-24 RX ADMIN — Medication 975 MILLIGRAM(S): at 07:18

## 2019-07-24 RX ADMIN — Medication 975 MILLIGRAM(S): at 06:23

## 2019-07-24 RX ADMIN — Medication 975 MILLIGRAM(S): at 23:42

## 2019-07-24 RX ADMIN — TRAMADOL HYDROCHLORIDE 25 MILLIGRAM(S): 50 TABLET ORAL at 02:45

## 2019-07-24 RX ADMIN — AMLODIPINE BESYLATE 5 MILLIGRAM(S): 2.5 TABLET ORAL at 06:23

## 2019-07-24 RX ADMIN — SIMVASTATIN 20 MILLIGRAM(S): 20 TABLET, FILM COATED ORAL at 22:41

## 2019-07-24 RX ADMIN — Medication 975 MILLIGRAM(S): at 14:30

## 2019-07-24 RX ADMIN — LOSARTAN POTASSIUM 100 MILLIGRAM(S): 100 TABLET, FILM COATED ORAL at 06:23

## 2019-07-24 NOTE — PROGRESS NOTE ADULT - SUBJECTIVE AND OBJECTIVE BOX
Patient is a 89y Female     Patient is a 89y old  Female who presents with a chief complaint of Bloody bowel movements (23 Jul 2019 22:56)      HPI:  89 y/o female PMHz polio, HTN, HLD, Afib on Eliquis, s/p partial R side hip replacement 6/2019 c/b right sided CVA with LUE defecits and had GIB during admission requiring 4 units PRBCs brought in via EMS to the ED with melena starting today. Patient stated she had diffuse lower abdominal cramping this morning worsened prior to defecation and noticed she had melena. Patient felt like she was going to syncopize on her third bowel movement with dizziness nausea SOB lightheadedness and diaphoresis. Per daughter had blood work yesterday with hemoglobin 7.9 and has been downtrending since her discharge to [facility]. Patient denied CP, actual syncope, back pain, headache, weakness in extremity, N/V/D, continued abdominal pain, urinary complaints (23 Jul 2019 22:56)      PAST MEDICAL & SURGICAL HISTORY:  Gastrointestinal bleed  Pleural effusion on left  Splenic abscess  Polio  High cholesterol  Hypertension  History of right hip replacement  History of appendectomy      MEDICATIONS  (STANDING):  acetaminophen   Tablet .. 975 milliGRAM(s) Oral every 8 hours  amLODIPine   Tablet 5 milliGRAM(s) Oral daily  dextrose 5% + sodium chloride 0.9% with potassium chloride 20 mEq/L 1000 milliLiter(s) (50 mL/Hr) IV Continuous <Continuous>  docusate sodium 100 milliGRAM(s) Oral three times a day  losartan 100 milliGRAM(s) Oral daily  pantoprazole    Tablet 40 milliGRAM(s) Oral before breakfast  polyethylene glycol 3350 17 Gram(s) Oral daily  simvastatin 20 milliGRAM(s) Oral at bedtime      Allergies    No Known Drug Allergies  Peaches (Rash)    Intolerances        SOCIAL HISTORY:  Denies ETOh,Smoking,     FAMILY HISTORY:  No pertinent family history in first degree relatives      REVIEW OF SYSTEMS:    CONSTITUTIONAL: No weakness, fevers or chills  EYES/ENT: No visual changes;  No vertigo or throat pain   NECK: No pain or stiffness  RESPIRATORY: No cough, wheezing, hemoptysis; No shortness of breath  CARDIOVASCULAR: No chest pain or palpitations  GASTROINTESTINAL: No abdominal or epigastric pain. No nausea, vomiting, or hematemesis; No diarrhea or constipation. No melena or hematochezia.  GENITOURINARY: No dysuria, frequency or hematuria  NEUROLOGICAL: No numbness or weakness  SKIN: No itching, burning, rashes, or lesions   All other review of systems is negative unless indicated above.    VITAL:  T(C): , Max: 37 (07-23-19 @ 16:57)  T(F): , Max: 98.6 (07-23-19 @ 16:57)  HR: 73 (07-24-19 @ 03:45)  BP: 132/68 (07-24-19 @ 03:45)  BP(mean): --  RR: 16 (07-24-19 @ 03:45)  SpO2: 97% (07-24-19 @ 03:45)  Wt(kg): --    I and O's:    07-24 @ 07:01  -  07-24 @ 09:45  --------------------------------------------------------  IN: 740 mL / OUT: 0 mL / NET: 740 mL      Height (cm): 167.6 (07-23 @ 21:00)  Weight (kg): 81.4 (07-23 @ 21:00)  BMI (kg/m2): 29 (07-23 @ 21:00)  BSA (m2): 1.91 (07-23 @ 21:00)    PHYSICAL EXAM:    Constitutional: NAD  HEENT: PERRLA,   Neck: No JVD  Respiratory: CTA B/L  Cardiovascular: S1 and S2  Gastrointestinal: BS+, soft, NT/ND  Extremities: No peripheral edema  Neurological: A/O x 3, no focal deficits  Psychiatric: Normal mood, normal affect  : No Snyder  Skin: No rashes  Access: Not applicable  Back: No CVA tenderness    LABS:                        10.3   6.5   )-----------( 250      ( 24 Jul 2019 00:59 )             30.9     07-24    141  |  107  |  47<H>  ----------------------------<  99  4.5   |  22  |  1.65<H>    Ca    9.7      24 Jul 2019 06:31  Phos  4.3     07-23  Mg     2.3     07-23    TPro  5.9<L>  /  Alb  3.1<L>  /  TBili  0.1<L>  /  DBili  x   /  AST  16  /  ALT  7<L>  /  AlkPhos  95  07-24          RADIOLOGY & ADDITIONAL STUDIES:

## 2019-07-24 NOTE — PROGRESS NOTE ADULT - ASSESSMENT
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 Patient stated she had diffuse lower abdominal cramping this morning worsened prior to defecation and noticed she had melena. Patient felt like she was going to syncopize on her third bowel movement with dizziness nausea SOB lightheadedness and diaphoresis. Per daughter had blood work yesterday with hemoglobin 7.9 and has been downtrending since her discharge to [facility]. Patient denied CP, actual syncope, back pain, headache, weakness in extremity, N/V/D, continued abdominal pain, urinary complaints

## 2019-07-24 NOTE — CONSULT NOTE ADULT - SUBJECTIVE AND OBJECTIVE BOX
89 y/o F with Pmhx of childhood polio, HTN, HLD, Afib on Eliquis, s/p partial R side hip replacement 6/2019 c/b right sided CVA with LUE weakness  and had GIB during admission requiring 4 units PRBCs. Now readmitted with GIB.         PAST MEDICAL & SURGICAL HISTORY:  Gastrointestinal bleed  Pleural effusion on left  Splenic abscess  Polio  High cholesterol  Hypertension  History of right hip replacement  History of appendectomy      SOCIAL HISTORY:                             ETOH; 4 glasses of wine/ year                            Quit smoking 30 years ago.   FAMILY HISTORY:  Denies history of heart disease in first degree relatives.     MEDICATIONS  (STANDING):  acetaminophen   Tablet .. 975 milliGRAM(s) Oral every 8 hours  amLODIPine   Tablet 5 milliGRAM(s) Oral daily  dextrose 5% + sodium chloride 0.9% with potassium chloride 20 mEq/L 1000 milliLiter(s) (50 mL/Hr) IV Continuous <Continuous>  docusate sodium 100 milliGRAM(s) Oral three times a day  losartan 100 milliGRAM(s) Oral daily  pantoprazole    Tablet 40 milliGRAM(s) Oral before breakfast  polyethylene glycol 3350 17 Gram(s) Oral daily  simvastatin 20 milliGRAM(s) Oral at bedtime    MEDICATIONS  (PRN):  mirtazapine 15 milliGRAM(s) Oral at bedtime PRN insomnia      Allergies    No Known Drug Allergies  Peaches (Rash)      REVIEW OF SYSTEM:    Constitutional: denies fevers/ chills. + Fatigue   Neuro: denies headaches/dizziness  Resp: denies shortness of breath  CVS: denies chest pain/ palpitations.   GI: denies abdominal pain/nausea/ vomiting/diarrhea   : denies dysuria/ hematuria   Skin: denies rashes or lesions   Msk: + chronic R foot weakness and deformity 2/2 hx of polio   Endo: Denies cold/ heat intolerance    Vital Signs Last 24 Hrs  T(C): 36.5 (24 Jul 2019 10:16), Max: 37 (23 Jul 2019 16:57)  T(F): 97.7 (24 Jul 2019 10:16), Max: 98.6 (23 Jul 2019 16:57)  HR: 71 (24 Jul 2019 10:16) (71 - 80)  BP: 99/62 (24 Jul 2019 10:16) (99/62 - 132/68)  BP(mean): --  RR: 18 (24 Jul 2019 10:16) (15 - 18)  SpO2: 97% (24 Jul 2019 10:16) (95% - 99%)    Physical Exam:  General : well developed, well nourished and in no acute distress.  Neuro : Alert and oriented x 3  HEENT : Sclera clear, neck supple  Lungs: Breathing nonlabored; clear to auscultation bilaterally.    Cardiovascular : + S1 +S2, RRR   GI : abdomen softly distended, NT/ no rebound,  + BS   Extremities : +2 bilateral radial pulses. No pedal edema.    Skin : Pale skin. No skin lesions or rashes noted.         LABS:                        10.2   5.0   )-----------( 215      ( 24 Jul 2019 13:08 )             29.8     07-24    141  |  107  |  47<H>  ----------------------------<  99  4.5   |  22  |  1.65<H>    Ca    9.7      24 Jul 2019 06:31  Phos  4.3     07-23  Mg     2.3     07-23    TPro  5.9<L>  /  Alb  3.1<L>  /  TBili  0.1<L>  /  DBili  x   /  AST  16  /  ALT  7<L>  /  AlkPhos  95  07-24    PT/INR - ( 23 Jul 2019 16:45 )   PT: 15.5 sec;   INR: 1.35 ratio         PTT - ( 23 Jul 2019 16:45 )  PTT:34.3 sec      RADIOLOGY & ADDITIONAL STUDIES: < from: Transthoracic Echocardiogram (06.25.19 @ 15:20) >  Patient name: BRAD NUÑEZ  YOB: 1925   Age: 89 (F)   MR#: [id number]  Study Date: 6/25/2019  Location: [address] [place], [place] [id number]: Aleksandar Pacheco CORNELIA  Study quality: Technically fair  Referring Physician: Tyson Barraza MD  BloodPressure: 124/67 mmHg  Height: 165 cm  Weight: 83 kg  BSA: 1.9 m2  Heart Rhythm: Normal sinus  ------------------------------------------------------------------------  PROCEDURE: Transthoracic echocardiogram with 2-D, M-Mode  and complete spectral and color flow Doppler.  INDICATION: Unspecified atrial fibrillation (I48.91)  ------------------------------------------------------------------------  Dimensions:    Normal Values:  LA:     3.2    2.0 - 4.0 cm  Ao:     3.0    2.0 - 3.8 cm  SEPTUM: 1.2    0.6 - 1.2 cm  PWT:    1.0    0.6 - 1.1 cm  LVIDd:  4.3    3.0 - 5.6 cm  LVIDs:  2.8    1.8 - 4.0 cm  Derived variables:  LVMI: 86 g/m2  RWT: 0.46  EF (Visual Estimate): 70 %  Doppler Peak Velocity (m/sec): AoV=2.3  ------------------------------------------------------------------------  Observations:  Mitral Valve: Mitral annular calcification. Mild mitral  valve thickening.  Minimal mitral regurgitation.  Aortic Valve/Aorta: Fibrocalcific aortic valve without  stenosis.  Normal aortic root size. (Ao: 3.0 cm at the sinuses of  Valsalva).  Left Atrium: Normal left atrium.  LA volume index = 33  cc/m2.  Left Ventricle: Normal left ventricular systolic function.  No segmental wall motion abnormalities. Normal left  ventricular internal dimensions and wall thicknesses.  Right Heart: Normal right atrium. Normal right ventricular  size and function. Normal tricuspid valve. Mild tricuspid  regurgitation. Normal pulmonic valve.  Pericardium/Pleura: Normal pericardium with no pericardial  effusion.  Hemodynamic: Estimated right atrial pressure is normal.  Pulmonary artery pressure is normal.  Agitated saline injection demonstrates no evidence of a  patent foramen ovale.  ------------------------------------------------------------------------  Conclusions:  Normal left ventricular systolic function. No segmental  wall motion abnormalities.  Agitated saline injection demonstrates no evidence of a  patent foramen ovale.  ------------------------------------------------------------------------  Confirmed on  6/25/2019 - 17:59:30 by Jonh Rader M.D. 89 y/o F with Pmhx of childhood polio, HTN, HLD, Afib on Eliquis,  S/p mechanical fall recent fall 6/2019 resulting in  right femoral neck fracture s/p right hip hemiarthroplasty with hospital course c/b acute right MCA territory infarct, severe Right ICA stenosis and GIB requiring 4 units PRBCs; s/p argon beam coagulation therapy  of bleeding  duodenal angioectasias  Now readmitted with GIB. EP consulted for Watchman evaluation.        PAST MEDICAL & SURGICAL HISTORY:  Gastrointestinal bleed  Pleural effusion on left  Splenic abscess  Polio  High cholesterol  Hypertension  History of right hip replacement  History of appendectomy      SOCIAL HISTORY:                             ETOH; 4 glasses of wine/ year                            Quit smoking 30 years ago.   FAMILY HISTORY:  Denies history of heart disease in first degree relatives.     MEDICATIONS  (STANDING):  acetaminophen   Tablet .. 975 milliGRAM(s) Oral every 8 hours  amLODIPine   Tablet 5 milliGRAM(s) Oral daily  dextrose 5% + sodium chloride 0.9% with potassium chloride 20 mEq/L 1000 milliLiter(s) (50 mL/Hr) IV Continuous <Continuous>  docusate sodium 100 milliGRAM(s) Oral three times a day  losartan 100 milliGRAM(s) Oral daily  pantoprazole    Tablet 40 milliGRAM(s) Oral before breakfast  polyethylene glycol 3350 17 Gram(s) Oral daily  simvastatin 20 milliGRAM(s) Oral at bedtime    MEDICATIONS  (PRN):  mirtazapine 15 milliGRAM(s) Oral at bedtime PRN insomnia      Allergies    No Known Drug Allergies  Peaches (Rash)      REVIEW OF SYSTEM:    Constitutional: denies fevers/ chills. + Fatigue   Neuro: denies headaches/dizziness  Resp: denies shortness of breath  CVS: denies chest pain/ palpitations.   GI: denies abdominal pain/nausea/ vomiting/diarrhea   : denies dysuria/ hematuria   Skin: denies rashes or lesions   Msk: + chronic R foot weakness and deformity 2/2 hx of polio   Endo: Denies cold/ heat intolerance    Vital Signs Last 24 Hrs  T(C): 36.5 (24 Jul 2019 10:16), Max: 37 (23 Jul 2019 16:57)  T(F): 97.7 (24 Jul 2019 10:16), Max: 98.6 (23 Jul 2019 16:57)  HR: 71 (24 Jul 2019 10:16) (71 - 80)  BP: 99/62 (24 Jul 2019 10:16) (99/62 - 132/68)  BP(mean): --  RR: 18 (24 Jul 2019 10:16) (15 - 18)  SpO2: 97% (24 Jul 2019 10:16) (95% - 99%)    Physical Exam:  General : well developed, well nourished and in no acute distress.  Neuro : Alert and oriented x 3  HEENT : Sclera clear, neck supple  Lungs: Breathing nonlabored; clear to auscultation bilaterally.    Cardiovascular : + S1 +S2, RRR   GI : abdomen softly distended, NT/ no rebound,  + BS   Extremities : +2 bilateral radial pulses. No pedal edema.    Skin : Pale skin. No skin lesions or rashes noted.         LABS:                        10.2   5.0   )-----------( 215      ( 24 Jul 2019 13:08 )             29.8     07-24    141  |  107  |  47<H>  ----------------------------<  99  4.5   |  22  |  1.65<H>    Ca    9.7      24 Jul 2019 06:31  Phos  4.3     07-23  Mg     2.3     07-23    TPro  5.9<L>  /  Alb  3.1<L>  /  TBili  0.1<L>  /  DBili  x   /  AST  16  /  ALT  7<L>  /  AlkPhos  95  07-24    PT/INR - ( 23 Jul 2019 16:45 )   PT: 15.5 sec;   INR: 1.35 ratio         PTT - ( 23 Jul 2019 16:45 )  PTT:34.3 sec      RADIOLOGY & ADDITIONAL STUDIES: < from: Transthoracic Echocardiogram (06.25.19 @ 15:20) >  Patient name: BRAD NUÑEZ  YOB: 1925   Age: 89 (F)   MR#: [id number]  Study Date: 6/25/2019  Location: [address] [place], [place] [id number]: Aleksandar Pacheco RDCS  Study quality: Technically fair  Referring Physician: Tyson Barraza MD  BloodPressure: 124/67 mmHg  Height: 165 cm  Weight: 83 kg  BSA: 1.9 m2  Heart Rhythm: Normal sinus  ------------------------------------------------------------------------  PROCEDURE: Transthoracic echocardiogram with 2-D, M-Mode  and complete spectral and color flow Doppler.  INDICATION: Unspecified atrial fibrillation (I48.91)  ------------------------------------------------------------------------  Dimensions:    Normal Values:  LA:     3.2    2.0 - 4.0 cm  Ao:     3.0    2.0 - 3.8 cm  SEPTUM: 1.2    0.6 - 1.2 cm  PWT:    1.0    0.6 - 1.1 cm  LVIDd:  4.3    3.0 - 5.6 cm  LVIDs:  2.8    1.8 - 4.0 cm  Derived variables:  LVMI: 86 g/m2  RWT: 0.46  EF (Visual Estimate): 70 %  Doppler Peak Velocity (m/sec): AoV=2.3  ------------------------------------------------------------------------  Observations:  Mitral Valve: Mitral annular calcification. Mild mitral  valve thickening.  Minimal mitral regurgitation.  Aortic Valve/Aorta: Fibrocalcific aortic valve without  stenosis.  Normal aortic root size. (Ao: 3.0 cm at the sinuses of  Valsalva).  Left Atrium: Normal left atrium.  LA volume index = 33  cc/m2.  Left Ventricle: Normal left ventricular systolic function.  No segmental wall motion abnormalities. Normal left  ventricular internal dimensions and wall thicknesses.  Right Heart: Normal right atrium. Normal right ventricular  size and function. Normal tricuspid valve. Mild tricuspid  regurgitation. Normal pulmonic valve.  Pericardium/Pleura: Normal pericardium with no pericardial  effusion.  Hemodynamic: Estimated right atrial pressure is normal.  Pulmonary artery pressure is normal.  Agitated saline injection demonstrates no evidence of a  patent foramen ovale.  ------------------------------------------------------------------------  Conclusions:  Normal left ventricular systolic function. No segmental  wall motion abnormalities.  Agitated saline injection demonstrates no evidence of a  patent foramen ovale.  ------------------------------------------------------------------------  Confirmed on  6/25/2019 - 17:59:30 by Jonh Rader M.D.          < from: Xray Chest 1 View- PORTABLE-Urgent (06.24.19 @ 19:44) >    EXAM:  XR CHEST PORTABLE URGENT 1V                        PROCEDURE DATE:  06/24/2019    INTERPRETATION:  EXAMINATION: XR CHEST URGENT  CLINICAL INDICATION: Fever  TECHNIQUE: Single frontal view of the chest was obtained.  COMPARISON: Chest x-ray 6/21/2019.  FINDINGS:   The cardiac silhouette is unchanged.   The lungs are clear. No pneumothorax. No pleural effusion.  No acute osseous abnormalities.  IMPRESSION:   Clear lungs.  KEN PRUETT M.D., RADIOLGY RESIDENT  This document has been electronically signed.  PRIYANKA CARNEY M.D., ATTENDING RADIOLOGIST  This document has been electronically signed. Jun 25 2019  4:33PM 89 y/o F with Pmhx of childhood polio, HTN, HLD, PAF on Eliquis,  S/p mechanical fall recent fall 6/2019 resulting in  right femoral neck fracture s/p right hip hemiarthroplasty with hospital course c/b acute right MCA territory infarct, severe Right ICA stenosis and GIB requiring 4 units PRBCs; s/p argon beam coagulation therapy  of bleeding  duodenal angioectasias  Now readmitted with GIB. EP consulted for Watchman evaluation.        PAST MEDICAL & SURGICAL HISTORY:  Gastrointestinal bleed  Pleural effusion on left  Splenic abscess  Polio  High cholesterol  Hypertension  History of right hip replacement  History of appendectomy      SOCIAL HISTORY:                             ETOH; 4 glasses of wine/ year                            Quit smoking 30 years ago.   FAMILY HISTORY:  Denies history of heart disease in first degree relatives.     MEDICATIONS  (STANDING):  acetaminophen   Tablet .. 975 milliGRAM(s) Oral every 8 hours  amLODIPine   Tablet 5 milliGRAM(s) Oral daily  dextrose 5% + sodium chloride 0.9% with potassium chloride 20 mEq/L 1000 milliLiter(s) (50 mL/Hr) IV Continuous <Continuous>  docusate sodium 100 milliGRAM(s) Oral three times a day  losartan 100 milliGRAM(s) Oral daily  pantoprazole    Tablet 40 milliGRAM(s) Oral before breakfast  polyethylene glycol 3350 17 Gram(s) Oral daily  simvastatin 20 milliGRAM(s) Oral at bedtime    MEDICATIONS  (PRN):  mirtazapine 15 milliGRAM(s) Oral at bedtime PRN insomnia      Allergies    No Known Drug Allergies  Peaches (Rash)      REVIEW OF SYSTEM:    Constitutional: denies fevers/ chills. + Fatigue   Neuro: denies headaches/dizziness  Resp: denies shortness of breath  CVS: denies chest pain/ palpitations.   GI: denies abdominal pain/nausea/ vomiting/diarrhea   : denies dysuria/ hematuria   Skin: denies rashes or lesions   Msk: + chronic R foot weakness and deformity 2/2 hx of polio   Endo: Denies cold/ heat intolerance    Vital Signs Last 24 Hrs  T(C): 36.5 (24 Jul 2019 10:16), Max: 37 (23 Jul 2019 16:57)  T(F): 97.7 (24 Jul 2019 10:16), Max: 98.6 (23 Jul 2019 16:57)  HR: 71 (24 Jul 2019 10:16) (71 - 80)  BP: 99/62 (24 Jul 2019 10:16) (99/62 - 132/68)  BP(mean): --  RR: 18 (24 Jul 2019 10:16) (15 - 18)  SpO2: 97% (24 Jul 2019 10:16) (95% - 99%)    Physical Exam:  General : well developed, well nourished and in no acute distress.  Neuro : Alert and oriented x 3  HEENT : Sclera clear, neck supple  Lungs: Breathing nonlabored; clear to auscultation bilaterally.    Cardiovascular : + S1 +S2, RRR   GI : abdomen softly distended, NT/ no rebound,  + BS   Extremities : +2 bilateral radial pulses. No pedal edema.    Skin : Pale skin. No skin lesions or rashes noted.         LABS:                        10.2   5.0   )-----------( 215      ( 24 Jul 2019 13:08 )             29.8     07-24    141  |  107  |  47<H>  ----------------------------<  99  4.5   |  22  |  1.65<H>    Ca    9.7      24 Jul 2019 06:31  Phos  4.3     07-23  Mg     2.3     07-23    TPro  5.9<L>  /  Alb  3.1<L>  /  TBili  0.1<L>  /  DBili  x   /  AST  16  /  ALT  7<L>  /  AlkPhos  95  07-24    PT/INR - ( 23 Jul 2019 16:45 )   PT: 15.5 sec;   INR: 1.35 ratio         PTT - ( 23 Jul 2019 16:45 )  PTT:34.3 sec      RADIOLOGY & ADDITIONAL STUDIES: < from: Transthoracic Echocardiogram (06.25.19 @ 15:20) >  Patient name: BRAD NUÑEZ  YOB: 1925   Age: 89 (F)   MR#: [id number]  Study Date: 6/25/2019  Location: [address] [place], [place] [id number]: Aleksandar Pacheco RDCS  Study quality: Technically fair  Referring Physician: Tyson Barraza MD  BloodPressure: 124/67 mmHg  Height: 165 cm  Weight: 83 kg  BSA: 1.9 m2  Heart Rhythm: Normal sinus  ------------------------------------------------------------------------  PROCEDURE: Transthoracic echocardiogram with 2-D, M-Mode  and complete spectral and color flow Doppler.  INDICATION: Unspecified atrial fibrillation (I48.91)  ------------------------------------------------------------------------  Dimensions:    Normal Values:  LA:     3.2    2.0 - 4.0 cm  Ao:     3.0    2.0 - 3.8 cm  SEPTUM: 1.2    0.6 - 1.2 cm  PWT:    1.0    0.6 - 1.1 cm  LVIDd:  4.3    3.0 - 5.6 cm  LVIDs:  2.8    1.8 - 4.0 cm  Derived variables:  LVMI: 86 g/m2  RWT: 0.46  EF (Visual Estimate): 70 %  Doppler Peak Velocity (m/sec): AoV=2.3  ------------------------------------------------------------------------  Observations:  Mitral Valve: Mitral annular calcification. Mild mitral  valve thickening.  Minimal mitral regurgitation.  Aortic Valve/Aorta: Fibrocalcific aortic valve without  stenosis.  Normal aortic root size. (Ao: 3.0 cm at the sinuses of  Valsalva).  Left Atrium: Normal left atrium.  LA volume index = 33  cc/m2.  Left Ventricle: Normal left ventricular systolic function.  No segmental wall motion abnormalities. Normal left  ventricular internal dimensions and wall thicknesses.  Right Heart: Normal right atrium. Normal right ventricular  size and function. Normal tricuspid valve. Mild tricuspid  regurgitation. Normal pulmonic valve.  Pericardium/Pleura: Normal pericardium with no pericardial  effusion.  Hemodynamic: Estimated right atrial pressure is normal.  Pulmonary artery pressure is normal.  Agitated saline injection demonstrates no evidence of a  patent foramen ovale.  ------------------------------------------------------------------------  Conclusions:  Normal left ventricular systolic function. No segmental  wall motion abnormalities.  Agitated saline injection demonstrates no evidence of a  patent foramen ovale.  ------------------------------------------------------------------------  Confirmed on  6/25/2019 - 17:59:30 by Jonh Rader M.D.          < from: Xray Chest 1 View- PORTABLE-Urgent (06.24.19 @ 19:44) >    EXAM:  XR CHEST PORTABLE URGENT 1V                        PROCEDURE DATE:  06/24/2019    INTERPRETATION:  EXAMINATION: XR CHEST URGENT  CLINICAL INDICATION: Fever  TECHNIQUE: Single frontal view of the chest was obtained.  COMPARISON: Chest x-ray 6/21/2019.  FINDINGS:   The cardiac silhouette is unchanged.   The lungs are clear. No pneumothorax. No pleural effusion.  No acute osseous abnormalities.  IMPRESSION:   Clear lungs.  KEN PRUETT M.D., RADIOLGY RESIDENT  This document has been electronically signed.  PRIYANKA CARNEY M.D., ATTENDING RADIOLOGIST  This document has been electronically signed. Jun 25 2019  4:33PM 89 y/o F with Pmhx of childhood polio, HTN, HLD, PAF on Eliquis,  S/p mechanical fall recent fall 6/2019 resulting in  right femoral neck fracture s/p right hip hemiarthroplasty with hospital course c/b acute right MCA territory infarct, severe Right ICA stenosis and GIB requiring 4 units PRBCs; s/p argon beam coagulation therapy  of bleeding  duodenal angioectasias  Now readmitted with GIB. EP consulted for Watchman evaluation.        PAST MEDICAL & SURGICAL HISTORY:  Gastrointestinal bleed  Pleural effusion on left  Splenic abscess  Polio  High cholesterol  Hypertension  History of right hip replacement  History of appendectomy      SOCIAL HISTORY:                             ETOH; 4 glasses of wine/ year                            Quit smoking 30 years ago.   FAMILY HISTORY:  Denies history of heart disease in first degree relatives.     MEDICATIONS  (STANDING):  acetaminophen   Tablet .. 975 milliGRAM(s) Oral every 8 hours  amLODIPine   Tablet 5 milliGRAM(s) Oral daily  dextrose 5% + sodium chloride 0.9% with potassium chloride 20 mEq/L 1000 milliLiter(s) (50 mL/Hr) IV Continuous <Continuous>  docusate sodium 100 milliGRAM(s) Oral three times a day  losartan 100 milliGRAM(s) Oral daily  pantoprazole    Tablet 40 milliGRAM(s) Oral before breakfast  polyethylene glycol 3350 17 Gram(s) Oral daily  simvastatin 20 milliGRAM(s) Oral at bedtime    MEDICATIONS  (PRN):  mirtazapine 15 milliGRAM(s) Oral at bedtime PRN insomnia      Allergies    No Known Drug Allergies  Peaches (Rash)      REVIEW OF SYSTEM:    Constitutional: denies fevers/ chills. + Fatigue   Neuro: denies headaches/dizziness  Resp: denies shortness of breath  CVS: denies chest pain/ palpitations.   GI: denies abdominal pain/nausea/ vomiting/diarrhea   : denies dysuria/ hematuria   Skin: denies rashes or lesions   Msk: + chronic R foot weakness and deformity 2/2 hx of polio   Endo: Denies cold/ heat intolerance    Vital Signs Last 24 Hrs  T(C): 36.5 (24 Jul 2019 10:16), Max: 37 (23 Jul 2019 16:57)  T(F): 97.7 (24 Jul 2019 10:16), Max: 98.6 (23 Jul 2019 16:57)  HR: 71 (24 Jul 2019 10:16) (71 - 80)  BP: 99/62 (24 Jul 2019 10:16) (99/62 - 132/68)  BP(mean): --  RR: 18 (24 Jul 2019 10:16) (15 - 18)  SpO2: 97% (24 Jul 2019 10:16) (95% - 99%)    Physical Exam:  General : well developed, well nourished and in no acute distress.  Neuro : Alert and oriented x 3  HEENT : Sclera clear, neck supple  Lungs: Breathing nonlabored; clear to auscultation bilaterally.    Cardiovascular : + S1 +S2, RRR   GI : abdomen softly distended, NT/ no rebound,  + BS   Extremities : +2 bilateral radial pulses. No pedal edema.    Skin : Pale skin. No skin lesions or rashes noted.         LABS:                        10.2   5.0   )-----------( 215      ( 24 Jul 2019 13:08 )             29.8     07-24    141  |  107  |  47<H>  ----------------------------<  99  4.5   |  22  |  1.65<H>    Ca    9.7      24 Jul 2019 06:31  Phos  4.3     07-23  Mg     2.3     07-23    TPro  5.9<L>  /  Alb  3.1<L>  /  TBili  0.1<L>  /  DBili  x   /  AST  16  /  ALT  7<L>  /  AlkPhos  95  07-24    PT/INR - ( 23 Jul 2019 16:45 )   PT: 15.5 sec;   INR: 1.35 ratio         PTT - ( 23 Jul 2019 16:45 )  PTT:34.3 sec    EKG 7/23/19 : SR 73 bpm       RADIOLOGY & ADDITIONAL STUDIES: < from: Transthoracic Echocardiogram (06.25.19 @ 15:20) >  Patient name: BRAD NUÑEZ  YOB: 1925   Age: 89 (F)   MR#: [id number]  Study Date: 6/25/2019  Location: [address] [place], [place] [id number]: Aleksandar Pacheco RDCS  Study quality: Technically fair  Referring Physician: Tyson Barraza MD  BloodPressure: 124/67 mmHg  Height: 165 cm  Weight: 83 kg  BSA: 1.9 m2  Heart Rhythm: Normal sinus  ------------------------------------------------------------------------  PROCEDURE: Transthoracic echocardiogram with 2-D, M-Mode  and complete spectral and color flow Doppler.  INDICATION: Unspecified atrial fibrillation (I48.91)  ------------------------------------------------------------------------  Dimensions:    Normal Values:  LA:     3.2    2.0 - 4.0 cm  Ao:     3.0    2.0 - 3.8 cm  SEPTUM: 1.2    0.6 - 1.2 cm  PWT:    1.0    0.6 - 1.1 cm  LVIDd:  4.3    3.0 - 5.6 cm  LVIDs:  2.8    1.8 - 4.0 cm  Derived variables:  LVMI: 86 g/m2  RWT: 0.46  EF (Visual Estimate): 70 %  Doppler Peak Velocity (m/sec): AoV=2.3  ------------------------------------------------------------------------  Observations:  Mitral Valve: Mitral annular calcification. Mild mitral  valve thickening.  Minimal mitral regurgitation.  Aortic Valve/Aorta: Fibrocalcific aortic valve without  stenosis.  Normal aortic root size. (Ao: 3.0 cm at the sinuses of  Valsalva).  Left Atrium: Normal left atrium.  LA volume index = 33  cc/m2.  Left Ventricle: Normal left ventricular systolic function.  No segmental wall motion abnormalities. Normal left  ventricular internal dimensions and wall thicknesses.  Right Heart: Normal right atrium. Normal right ventricular  size and function. Normal tricuspid valve. Mild tricuspid  regurgitation. Normal pulmonic valve.  Pericardium/Pleura: Normal pericardium with no pericardial  effusion.  Hemodynamic: Estimated right atrial pressure is normal.  Pulmonary artery pressure is normal.  Agitated saline injection demonstrates no evidence of a  patent foramen ovale.  ------------------------------------------------------------------------  Conclusions:  Normal left ventricular systolic function. No segmental  wall motion abnormalities.  Agitated saline injection demonstrates no evidence of a  patent foramen ovale.  ------------------------------------------------------------------------  Confirmed on  6/25/2019 - 17:59:30 by Jonh Rader M.D.          < from: Xray Chest 1 View- PORTABLE-Urgent (06.24.19 @ 19:44) >    EXAM:  XR CHEST PORTABLE URGENT 1V                        PROCEDURE DATE:  06/24/2019    INTERPRETATION:  EXAMINATION: XR CHEST URGENT  CLINICAL INDICATION: Fever  TECHNIQUE: Single frontal view of the chest was obtained.  COMPARISON: Chest x-ray 6/21/2019.  FINDINGS:   The cardiac silhouette is unchanged.   The lungs are clear. No pneumothorax. No pleural effusion.  No acute osseous abnormalities.  IMPRESSION:   Clear lungs.  KEN PRUETT M.D., RADIOLGY RESIDENT  This document has been electronically signed.  PRIYANKA CARNEY M.D., ATTENDING RADIOLOGIST  This document has been electronically signed. Jun 25 2019  4:33PM

## 2019-07-24 NOTE — PROGRESS NOTE ADULT - ASSESSMENT
pt well known to me, recent endosocpy with avm bleed treated. on elequis with re;bleed. will need repeat egd? off elquis three days, friday. will need discussion re: r/b/a of a/c  possible aspirin or watchman? will speak with dr. monteiro

## 2019-07-24 NOTE — PROGRESS NOTE ADULT - SUBJECTIVE AND OBJECTIVE BOX
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 Patient stated she had diffuse lower abdominal cramping this morning worsened prior to defecation and noticed she had melena. Patient felt like she was going to syncopize on her third bowel movement with dizziness nausea SOB lightheadedness and diaphoresis. Per daughter had blood work yesterday with hemoglobin 7.9 and has been downtrending since her discharge to [facility]. Patient denied CP, actual syncope, back pain, headache, weakness in extremity, N/V/D, continued abdominal pain, urinary complaints    MEDICATIONS  (STANDING):  acetaminophen   Tablet .. 975 milliGRAM(s) Oral every 8 hours  amLODIPine   Tablet 5 milliGRAM(s) Oral daily  dextrose 5% + sodium chloride 0.9% with potassium chloride 20 mEq/L 1000 milliLiter(s) (50 mL/Hr) IV Continuous <Continuous>  docusate sodium 100 milliGRAM(s) Oral three times a day  losartan 100 milliGRAM(s) Oral daily  pantoprazole    Tablet 40 milliGRAM(s) Oral before breakfast  polyethylene glycol 3350 17 Gram(s) Oral daily  simvastatin 20 milliGRAM(s) Oral at bedtime    MEDICATIONS  (PRN):  mirtazapine 15 milliGRAM(s) Oral at bedtime PRN insomnia          VITALS:   T(C): 36.7 (07-24-19 @ 14:19), Max: 37 (07-23-19 @ 16:57)  HR: 71 (07-24-19 @ 14:19) (71 - 80)  BP: 118/64 (07-24-19 @ 14:19) (99/62 - 132/68)  RR: 18 (07-24-19 @ 14:19) (15 - 18)  SpO2: 97% (07-24-19 @ 14:19) (95% - 99%)  Wt(kg): --    PHYSICAL EXAM:  	GENERAL: NAD, well nourished and conversant  	HEAD:  Atraumatic  	EYES: EOM, PERRLA, conjunctiva pale and sclera white  	ENT: No tonsillar erythema, exudates, or enlargement, moist mucous membranes, good dentition, no lesions  	NECK: Supple, No JVD, normal thyroid, carotids with normal upstrokes and no bruits  	CHEST/LUNG: Clear to auscultation bilaterally, No rales, rhonchi, wheezing, or rubs  	HEART: Regular rate and rhythm, No murmurs, rubs, or gallops  	ABDOMEN: Soft, nondistended, no masses, guarding, tenderness or rebound, bowel sounds present  	EXTREMITIES:  2+ Peripheral Pulses, No clubbing, cyanosis, or edema. No arthritis of shoulders, elbows, hands, hips, knees, ankles, or feet. No DJD C spine, T spine, or L/S spine  RIGHT groin pain   	LYMPH: No lymphadenopathy noted  	SKIN: No rashes or lesions  NERVOUS SYSTEM:  Alert & Oriented X3, normal cognitive function. Motor Strength 5/5 right upper and right lower.  5/5 left upper and left lower extremities, DTRs 2+ intact and symmetri    LABS:        CBC Full  -  ( 24 Jul 2019 13:08 )  WBC Count : 5.0 K/uL  RBC Count : 3.23 M/uL  Hemoglobin : 10.2 g/dL  Hematocrit : 29.8 %  Platelet Count - Automated : 215 K/uL  Mean Cell Volume : 92.3 fl  Mean Cell Hemoglobin : 31.7 pg  Mean Cell Hemoglobin Concentration : 34.3 gm/dL  Auto Neutrophil # : x  Auto Lymphocyte # : x  Auto Monocyte # : x  Auto Eosinophil # : x  Auto Basophil # : x  Auto Neutrophil % : x  Auto Lymphocyte % : x  Auto Monocyte % : x  Auto Eosinophil % : x  Auto Basophil % : x    07-24    141  |  107  |  47<H>  ----------------------------<  99  4.5   |  22  |  1.65<H>    Ca    9.7      24 Jul 2019 06:31  Phos  4.3     07-23  Mg     2.3     07-23    TPro  5.9<L>  /  Alb  3.1<L>  /  TBili  0.1<L>  /  DBili  x   /  AST  16  /  ALT  7<L>  /  AlkPhos  95  07-24    LIVER FUNCTIONS - ( 24 Jul 2019 06:31 )  Alb: 3.1 g/dL / Pro: 5.9 g/dL / ALK PHOS: 95 U/L / ALT: 7 U/L / AST: 16 U/L / GGT: x           PT/INR - ( 23 Jul 2019 16:45 )   PT: 15.5 sec;   INR: 1.35 ratio         PTT - ( 23 Jul 2019 16:45 )  PTT:34.3 sec    CAPILLARY BLOOD GLUCOSE      POCT Blood Glucose.: 117 mg/dL (23 Jul 2019 16:40)      RADIOLOGY & ADDITIONAL TESTS:

## 2019-07-25 LAB
HCT VFR BLD CALC: 28.6 % — LOW (ref 34.5–45)
HCT VFR BLD CALC: 30.9 % — LOW (ref 34.5–45)
HCT VFR BLD CALC: 32.4 % — LOW (ref 34.5–45)
HGB BLD-MCNC: 10.4 G/DL — LOW (ref 11.5–15.5)
HGB BLD-MCNC: 10.6 G/DL — LOW (ref 11.5–15.5)
HGB BLD-MCNC: 9.8 G/DL — LOW (ref 11.5–15.5)
MCHC RBC-ENTMCNC: 30.3 PG — SIGNIFICANT CHANGE UP (ref 27–34)
MCHC RBC-ENTMCNC: 31.1 PG — SIGNIFICANT CHANGE UP (ref 27–34)
MCHC RBC-ENTMCNC: 31.6 PG — SIGNIFICANT CHANGE UP (ref 27–34)
MCHC RBC-ENTMCNC: 32.7 GM/DL — SIGNIFICANT CHANGE UP (ref 32–36)
MCHC RBC-ENTMCNC: 33.6 GM/DL — SIGNIFICANT CHANGE UP (ref 32–36)
MCHC RBC-ENTMCNC: 34.1 GM/DL — SIGNIFICANT CHANGE UP (ref 32–36)
MCV RBC AUTO: 92.6 FL — SIGNIFICANT CHANGE UP (ref 80–100)
MCV RBC AUTO: 92.7 FL — SIGNIFICANT CHANGE UP (ref 80–100)
MCV RBC AUTO: 92.7 FL — SIGNIFICANT CHANGE UP (ref 80–100)
PLATELET # BLD AUTO: 205 K/UL — SIGNIFICANT CHANGE UP (ref 150–400)
PLATELET # BLD AUTO: 207 K/UL — SIGNIFICANT CHANGE UP (ref 150–400)
PLATELET # BLD AUTO: 217 K/UL — SIGNIFICANT CHANGE UP (ref 150–400)
RBC # BLD: 3.08 M/UL — LOW (ref 3.8–5.2)
RBC # BLD: 3.34 M/UL — LOW (ref 3.8–5.2)
RBC # BLD: 3.49 M/UL — LOW (ref 3.8–5.2)
RBC # FLD: 15.4 % — HIGH (ref 10.3–14.5)
RBC # FLD: 15.6 % — HIGH (ref 10.3–14.5)
RBC # FLD: 15.6 % — HIGH (ref 10.3–14.5)
WBC # BLD: 4.9 K/UL — SIGNIFICANT CHANGE UP (ref 3.8–10.5)
WBC # BLD: 5.2 K/UL — SIGNIFICANT CHANGE UP (ref 3.8–10.5)
WBC # BLD: 5.4 K/UL — SIGNIFICANT CHANGE UP (ref 3.8–10.5)
WBC # FLD AUTO: 4.9 K/UL — SIGNIFICANT CHANGE UP (ref 3.8–10.5)
WBC # FLD AUTO: 5.2 K/UL — SIGNIFICANT CHANGE UP (ref 3.8–10.5)
WBC # FLD AUTO: 5.4 K/UL — SIGNIFICANT CHANGE UP (ref 3.8–10.5)

## 2019-07-25 RX ADMIN — AMLODIPINE BESYLATE 5 MILLIGRAM(S): 2.5 TABLET ORAL at 06:19

## 2019-07-25 RX ADMIN — DEXTROSE MONOHYDRATE, SODIUM CHLORIDE, AND POTASSIUM CHLORIDE 50 MILLILITER(S): 50; .745; 4.5 INJECTION, SOLUTION INTRAVENOUS at 14:48

## 2019-07-25 RX ADMIN — Medication 975 MILLIGRAM(S): at 22:13

## 2019-07-25 RX ADMIN — Medication 100 MILLIGRAM(S): at 06:19

## 2019-07-25 RX ADMIN — SIMVASTATIN 20 MILLIGRAM(S): 20 TABLET, FILM COATED ORAL at 22:13

## 2019-07-25 RX ADMIN — Medication 975 MILLIGRAM(S): at 06:19

## 2019-07-25 RX ADMIN — Medication 975 MILLIGRAM(S): at 23:01

## 2019-07-25 RX ADMIN — PANTOPRAZOLE SODIUM 40 MILLIGRAM(S): 20 TABLET, DELAYED RELEASE ORAL at 06:19

## 2019-07-25 RX ADMIN — Medication 975 MILLIGRAM(S): at 07:15

## 2019-07-25 RX ADMIN — LOSARTAN POTASSIUM 100 MILLIGRAM(S): 100 TABLET, FILM COATED ORAL at 06:19

## 2019-07-25 RX ADMIN — Medication 100 MILLIGRAM(S): at 22:13

## 2019-07-25 NOTE — PROGRESS NOTE ADULT - ASSESSMENT
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 Patient stated she had diffuse lower abdominal cramping this morning worsened prior to defecation and noticed she had melena. Patient felt like she was going to syncopize on her third bowel movement with dizziness nausea SOB lightheadedness and diaphoresis. Per daughter had blood work yesterday with hemoglobin 7.9 and has been downtrending since her discharge to [facility]. Patient denied CP, actual syncope, back pain, headache, weakness in extremity, N/V/D, continued abdominal pain, urinary complaints

## 2019-07-25 NOTE — PROGRESS NOTE ADULT - SUBJECTIVE AND OBJECTIVE BOX
BRAD JOAQUIN:[id number],   89yFemale followed for:  No Known Drug Allergies  Peaches (Rash)    PAST MEDICAL & SURGICAL HISTORY:  Gastrointestinal bleed  Pleural effusion on left  Splenic abscess  Polio  High cholesterol  Hypertension  History of right hip replacement  History of appendectomy    FAMILY HISTORY:  No pertinent family history in first degree relatives    MEDICATIONS  (STANDING):  acetaminophen   Tablet .. 975 milliGRAM(s) Oral every 8 hours  amLODIPine   Tablet 5 milliGRAM(s) Oral daily  dextrose 5% + sodium chloride 0.9% with potassium chloride 20 mEq/L 1000 milliLiter(s) (50 mL/Hr) IV Continuous <Continuous>  docusate sodium 100 milliGRAM(s) Oral three times a day  losartan 100 milliGRAM(s) Oral daily  pantoprazole    Tablet 40 milliGRAM(s) Oral before breakfast  polyethylene glycol 3350 17 Gram(s) Oral daily  simvastatin 20 milliGRAM(s) Oral at bedtime    MEDICATIONS  (PRN):  mirtazapine 15 milliGRAM(s) Oral at bedtime PRN insomnia      Vital Signs Last 24 Hrs  T(C): 36.7 (25 Jul 2019 04:03), Max: 36.7 (24 Jul 2019 14:19)  T(F): 98 (25 Jul 2019 04:03), Max: 98.1 (24 Jul 2019 14:19)  HR: 70 (25 Jul 2019 04:03) (69 - 71)  BP: 124/62 (25 Jul 2019 04:03) (99/62 - 124/62)  BP(mean): --  RR: 18 (25 Jul 2019 04:03) (18 - 18)  SpO2: 96% (25 Jul 2019 04:03) (94% - 97%)  nc/at  s1s2  cta  soft, nt, nd no guarding or rebound  no c/c/e    CBC Full  -  ( 25 Jul 2019 05:32 )  WBC Count : 5.4 K/uL  RBC Count : 3.08 M/uL  Hemoglobin : 9.8 g/dL  Hematocrit : 28.6 %  Platelet Count - Automated : 207 K/uL  Mean Cell Volume : 92.7 fl  Mean Cell Hemoglobin : 31.6 pg  Mean Cell Hemoglobin Concentration : 34.1 gm/dL  Auto Neutrophil # : x  Auto Lymphocyte # : x  Auto Monocyte # : x  Auto Eosinophil # : x  Auto Basophil # : x  Auto Neutrophil % : x  Auto Lymphocyte % : x  Auto Monocyte % : x  Auto Eosinophil % : x  Auto Basophil % : x    07-24    141  |  107  |  47<H>  ----------------------------<  99  4.5   |  22  |  1.65<H>    Ca    9.7      24 Jul 2019 06:31  Phos  4.3     07-23  Mg     2.3     07-23    TPro  5.9<L>  /  Alb  3.1<L>  /  TBili  0.1<L>  /  DBili  x   /  AST  16  /  ALT  7<L>  /  AlkPhos  95  07-24    PT/INR - ( 23 Jul 2019 16:45 )   PT: 15.5 sec;   INR: 1.35 ratio         PTT - ( 23 Jul 2019 16:45 )  PTT:34.3 sec

## 2019-07-25 NOTE — PROGRESS NOTE ADULT - SUBJECTIVE AND OBJECTIVE BOX
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 Patient stated she had diffuse lower abdominal cramping this morning worsened prior to defecation and noticed she had melena. Patient felt like she was going to syncopize on her third bowel movement with dizziness nausea SOB lightheadedness and diaphoresis. Per daughter had blood work yesterday with hemoglobin 7.9 and has been downtrending since her discharge to [facility]. Patient complaining of right hip pain. Has had no further BMs.    MEDICATIONS  (STANDING):  acetaminophen   Tablet .. 975 milliGRAM(s) Oral every 8 hours  amLODIPine   Tablet 5 milliGRAM(s) Oral daily  dextrose 5% + sodium chloride 0.9% with potassium chloride 20 mEq/L 1000 milliLiter(s) (50 mL/Hr) IV Continuous <Continuous>  docusate sodium 100 milliGRAM(s) Oral three times a day  losartan 100 milliGRAM(s) Oral daily  pantoprazole    Tablet 40 milliGRAM(s) Oral before breakfast  polyethylene glycol 3350 17 Gram(s) Oral daily  simvastatin 20 milliGRAM(s) Oral at bedtime    MEDICATIONS  (PRN):  mirtazapine 15 milliGRAM(s) Oral at bedtime PRN insomnia          VITALS:   T(C): 36.6 (07-25-19 @ 09:23), Max: 36.7 (07-24-19 @ 14:19)  HR: 68 (07-25-19 @ 09:23) (68 - 71)  BP: 132/75 (07-25-19 @ 09:23) (118/64 - 132/75)  RR: 18 (07-25-19 @ 09:23) (18 - 18)  SpO2: 95% (07-25-19 @ 09:23) (94% - 97%)  Wt(kg): --    PHYSICAL EXAM:  	GENERAL: NAD, well nourished and conversant  	HEAD:  Atraumatic  	EYES: EOM, PERRLA, conjunctiva pale and sclera white  	ENT: No tonsillar erythema, exudates, or enlargement, moist mucous membranes, good dentition, no lesions  	NECK: Supple, No JVD, normal thyroid, carotids with normal upstrokes and no bruits  	CHEST/LUNG: Clear to auscultation bilaterally, No rales, rhonchi, wheezing, or rubs  	HEART: Regular rate and rhythm, No murmurs, rubs, or gallops  	ABDOMEN: Soft, nondistended, no masses, guarding, tenderness or rebound, bowel sounds present  	EXTREMITIES:  2+ Peripheral Pulses, No clubbing, cyanosis, or edema. No arthritis of shoulders, elbows, hands, hips, knees, ankles, or feet. No DJD C spine, T spine, or L/S spine  RIGHT groin pain   	LYMPH: No lymphadenopathy noted  	SKIN: No rashes or lesions  NERVOUS SYSTEM:  Alert & Oriented X3, normal cognitive function. Motor Strength 5/5 right upper and right lower.       LABS:        CBC Full  -  ( 25 Jul 2019 05:32 )  WBC Count : 5.4 K/uL  RBC Count : 3.08 M/uL  Hemoglobin : 9.8 g/dL  Hematocrit : 28.6 %  Platelet Count - Automated : 207 K/uL  Mean Cell Volume : 92.7 fl  Mean Cell Hemoglobin : 31.6 pg  Mean Cell Hemoglobin Concentration : 34.1 gm/dL  Auto Neutrophil # : x  Auto Lymphocyte # : x  Auto Monocyte # : x  Auto Eosinophil # : x  Auto Basophil # : x  Auto Neutrophil % : x  Auto Lymphocyte % : x  Auto Monocyte % : x  Auto Eosinophil % : x  Auto Basophil % : x    07-24    141  |  107  |  47<H>  ----------------------------<  99  4.5   |  22  |  1.65<H>    Ca    9.7      24 Jul 2019 06:31  Phos  4.3     07-23  Mg     2.3     07-23    TPro  5.9<L>  /  Alb  3.1<L>  /  TBili  0.1<L>  /  DBili  x   /  AST  16  /  ALT  7<L>  /  AlkPhos  95  07-24    LIVER FUNCTIONS - ( 24 Jul 2019 06:31 )  Alb: 3.1 g/dL / Pro: 5.9 g/dL / ALK PHOS: 95 U/L / ALT: 7 U/L / AST: 16 U/L / GGT: x           PT/INR - ( 23 Jul 2019 16:45 )   PT: 15.5 sec;   INR: 1.35 ratio         PTT - ( 23 Jul 2019 16:45 )  PTT:34.3 sec    CAPILLARY BLOOD GLUCOSE          RADIOLOGY & ADDITIONAL TESTS:

## 2019-07-26 LAB
ANION GAP SERPL CALC-SCNC: 8 MMOL/L — SIGNIFICANT CHANGE UP (ref 5–17)
BUN SERPL-MCNC: 22 MG/DL — SIGNIFICANT CHANGE UP (ref 7–23)
CALCIUM SERPL-MCNC: 9.3 MG/DL — SIGNIFICANT CHANGE UP (ref 8.4–10.5)
CHLORIDE SERPL-SCNC: 114 MMOL/L — HIGH (ref 96–108)
CO2 SERPL-SCNC: 23 MMOL/L — SIGNIFICANT CHANGE UP (ref 22–31)
CREAT SERPL-MCNC: 1.33 MG/DL — HIGH (ref 0.5–1.3)
GLUCOSE SERPL-MCNC: 99 MG/DL — SIGNIFICANT CHANGE UP (ref 70–99)
HCT VFR BLD CALC: 29.7 % — LOW (ref 34.5–45)
HCT VFR BLD CALC: 30.2 % — LOW (ref 34.5–45)
HGB BLD-MCNC: 10 G/DL — LOW (ref 11.5–15.5)
HGB BLD-MCNC: 9 G/DL — LOW (ref 11.5–15.5)
MCHC RBC-ENTMCNC: 29 PG — SIGNIFICANT CHANGE UP (ref 27–34)
MCHC RBC-ENTMCNC: 29.8 GM/DL — LOW (ref 32–36)
MCHC RBC-ENTMCNC: 31 PG — SIGNIFICANT CHANGE UP (ref 27–34)
MCHC RBC-ENTMCNC: 33.8 GM/DL — SIGNIFICANT CHANGE UP (ref 32–36)
MCV RBC AUTO: 91.7 FL — SIGNIFICANT CHANGE UP (ref 80–100)
MCV RBC AUTO: 97.4 FL — SIGNIFICANT CHANGE UP (ref 80–100)
PLATELET # BLD AUTO: 203 K/UL — SIGNIFICANT CHANGE UP (ref 150–400)
PLATELET # BLD AUTO: 210 K/UL — SIGNIFICANT CHANGE UP (ref 150–400)
POTASSIUM SERPL-MCNC: 4.6 MMOL/L — SIGNIFICANT CHANGE UP (ref 3.5–5.3)
POTASSIUM SERPL-SCNC: 4.6 MMOL/L — SIGNIFICANT CHANGE UP (ref 3.5–5.3)
RBC # BLD: 3.1 M/UL — LOW (ref 3.8–5.2)
RBC # BLD: 3.23 M/UL — LOW (ref 3.8–5.2)
RBC # FLD: 15 % — HIGH (ref 10.3–14.5)
RBC # FLD: 17.1 % — HIGH (ref 10.3–14.5)
SODIUM SERPL-SCNC: 145 MMOL/L — SIGNIFICANT CHANGE UP (ref 135–145)
WBC # BLD: 4.49 K/UL — SIGNIFICANT CHANGE UP (ref 3.8–10.5)
WBC # BLD: 4.9 K/UL — SIGNIFICANT CHANGE UP (ref 3.8–10.5)
WBC # FLD AUTO: 4.49 K/UL — SIGNIFICANT CHANGE UP (ref 3.8–10.5)
WBC # FLD AUTO: 4.9 K/UL — SIGNIFICANT CHANGE UP (ref 3.8–10.5)

## 2019-07-26 RX ADMIN — Medication 975 MILLIGRAM(S): at 06:52

## 2019-07-26 RX ADMIN — Medication 975 MILLIGRAM(S): at 14:29

## 2019-07-26 RX ADMIN — DEXTROSE MONOHYDRATE, SODIUM CHLORIDE, AND POTASSIUM CHLORIDE 50 MILLILITER(S): 50; .745; 4.5 INJECTION, SOLUTION INTRAVENOUS at 12:06

## 2019-07-26 RX ADMIN — LOSARTAN POTASSIUM 100 MILLIGRAM(S): 100 TABLET, FILM COATED ORAL at 06:01

## 2019-07-26 RX ADMIN — AMLODIPINE BESYLATE 5 MILLIGRAM(S): 2.5 TABLET ORAL at 06:01

## 2019-07-26 RX ADMIN — Medication 975 MILLIGRAM(S): at 15:20

## 2019-07-26 RX ADMIN — Medication 975 MILLIGRAM(S): at 21:55

## 2019-07-26 RX ADMIN — Medication 100 MILLIGRAM(S): at 06:01

## 2019-07-26 RX ADMIN — SIMVASTATIN 20 MILLIGRAM(S): 20 TABLET, FILM COATED ORAL at 21:26

## 2019-07-26 RX ADMIN — Medication 975 MILLIGRAM(S): at 21:25

## 2019-07-26 RX ADMIN — Medication 975 MILLIGRAM(S): at 06:01

## 2019-07-26 RX ADMIN — PANTOPRAZOLE SODIUM 40 MILLIGRAM(S): 20 TABLET, DELAYED RELEASE ORAL at 06:01

## 2019-07-26 NOTE — PROGRESS NOTE ADULT - ASSESSMENT
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 Patient stated she had diffuse lower abdominal cramping this morning worsened prior to defecation and noticed she had melena. Patient felt like she was going to syncopize on her third bowel movement with dizziness nausea SOB lightheadedness and diaphoresis. Per daughter had blood work yesterday with hemoglobin 7.9 and has been downtrending since her discharge to [facility]. Patient denied CP, actual syncope, back pain, headache, weakness in extremity, N/V/D, continued abdominal pain, urinary complaints

## 2019-07-26 NOTE — PHYSICAL THERAPY INITIAL EVALUATION ADULT - PLANNED THERAPY INTERVENTIONS, PT EVAL
balance training/bed mobility training/stair training- GOAL: 4 steps with rail and contact guard, 4 wks/transfer training/gait training

## 2019-07-26 NOTE — PHYSICAL THERAPY INITIAL EVALUATION ADULT - ADDITIONAL COMMENTS
AS per GI and EP, if pt is no longer candidate for AC will recommend Watchman evaluation. AS per GI and EP, if pt is no longer candidate for AC will recommend Watchman evaluation. Prior level of function- Pt stating that at rehab was ambulating short distances with rolling walker and assist of PT. Prior to recent hospitalization, pt resides in a PH alone, 4 stairs to enter with railings, one floor set up, PTA, pt amb (I) with rollator, distance limited to 100ft x2, independent with ADLS's.

## 2019-07-26 NOTE — PROGRESS NOTE ADULT - SUBJECTIVE AND OBJECTIVE BOX
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 Patient stated she had diffuse lower abdominal cramping this morning worsened prior to defecation and noticed she had melena. Patient felt like she was going to syncopize on her third bowel movement with dizziness nausea SOB lightheadedness and diaphoresis. Per daughter had blood work yesterday with hemoglobin 7.9 and has been downtrending since her discharge to [facility]. Patient complaining of right hip pain. Has had no further BMs.  Discussing Watchman procedure with cardiology.    MEDICATIONS  (STANDING):  acetaminophen   Tablet .. 975 milliGRAM(s) Oral every 8 hours  amLODIPine   Tablet 5 milliGRAM(s) Oral daily  dextrose 5% + sodium chloride 0.9% with potassium chloride 20 mEq/L 1000 milliLiter(s) (50 mL/Hr) IV Continuous <Continuous>  docusate sodium 100 milliGRAM(s) Oral three times a day  losartan 100 milliGRAM(s) Oral daily  pantoprazole    Tablet 40 milliGRAM(s) Oral before breakfast  polyethylene glycol 3350 17 Gram(s) Oral daily  simvastatin 20 milliGRAM(s) Oral at bedtime    MEDICATIONS  (PRN):  mirtazapine 15 milliGRAM(s) Oral at bedtime PRN insomnia      Vital Signs Last 24 Hrs  T(C): 36.6 (26 Jul 2019 23:55), Max: 36.7 (26 Jul 2019 10:09)  T(F): 97.8 (26 Jul 2019 23:55), Max: 98.1 (26 Jul 2019 20:06)  HR: 55 (26 Jul 2019 23:55) (55 - 75)  BP: 147/72 (26 Jul 2019 23:55) (115/69 - 156/71)  BP(mean): --  RR: 18 (26 Jul 2019 23:55) (18 - 18)  SpO2: 97% (26 Jul 2019 23:55) (95% - 98%)    PHYSICAL EXAM:  	GENERAL: NAD, well nourished and conversant  	HEAD:  Atraumatic  	EYES: EOM, PERRLA, conjunctiva pale and sclera white  	ENT: No tonsillar erythema, exudates, or enlargement, moist mucous membranes, good dentition, no lesions  	NECK: Supple, No JVD, normal thyroid, carotids with normal upstrokes and no bruits  	CHEST/LUNG: Clear to auscultation bilaterally, No rales, rhonchi, wheezing, or rubs  	HEART: Regular rate and rhythm, No murmurs, rubs, or gallops  	ABDOMEN: Soft, nondistended, no masses, guarding, tenderness or rebound, bowel sounds present  	EXTREMITIES:  2+ Peripheral Pulses, No clubbing, cyanosis, or edema. No arthritis of shoulders, elbows, hands, hips, knees, ankles, or feet.  (+) pelvic pain  	LYMPH: No lymphadenopathy noted  	SKIN: No rashes or lesions  NERVOUS SYSTEM:  Alert & Oriented X3, normal cognitive function. Motor Strength 5/5 right upper and right lower.       LABS:          CBC Full  -  ( 26 Jul 2019 16:01 )  WBC Count : 4.9 K/uL  RBC Count : 3.23 M/uL  Hemoglobin : 10.0 g/dL  Hematocrit : 29.7 %  Platelet Count - Automated : 210 K/uL  Mean Cell Volume : 91.7 fl  Mean Cell Hemoglobin : 31.0 pg  Mean Cell Hemoglobin Concentration : 33.8 gm/dL  Auto Neutrophil # : x  Auto Lymphocyte # : x  Auto Monocyte # : x  Auto Eosinophil # : x  Auto Basophil # : x  Auto Neutrophil % : x  Auto Lymphocyte % : x  Auto Monocyte % : x  Auto Eosinophil % : x  Auto Basophil % : x    07-26    145  |  114<H>  |  22  ----------------------------<  99  4.6   |  23  |  1.33<H>    Ca    9.3      26 Jul 2019 06:26              CBC Full  -  ( 25 Jul 2019 05:32 )  WBC Count : 5.4 K/uL  RBC Count : 3.08 M/uL  Hemoglobin : 9.8 g/dL  Hematocrit : 28.6 %  Platelet Count - Automated : 207 K/uL  Mean Cell Volume : 92.7 fl  Mean Cell Hemoglobin : 31.6 pg  Mean Cell Hemoglobin Concentration : 34.1 gm/dL  Auto Neutrophil # : x  Auto Lymphocyte # : x  Auto Monocyte # : x  Auto Eosinophil # : x  Auto Basophil # : x  Auto Neutrophil % : x  Auto Lymphocyte % : x  Auto Monocyte % : x  Auto Eosinophil % : x  Auto Basophil % : x    07-24    141  |  107  |  47<H>  ----------------------------<  99  4.5   |  22  |  1.65<H>    Ca    9.7      24 Jul 2019 06:31  Phos  4.3     07-23  Mg     2.3     07-23    TPro  5.9<L>  /  Alb  3.1<L>  /  TBili  0.1<L>  /  DBili  x   /  AST  16  /  ALT  7<L>  /  AlkPhos  95  07-24    LIVER FUNCTIONS - ( 24 Jul 2019 06:31 )  Alb: 3.1 g/dL / Pro: 5.9 g/dL / ALK PHOS: 95 U/L / ALT: 7 U/L / AST: 16 U/L / GGT: x           PT/INR - ( 23 Jul 2019 16:45 )   PT: 15.5 sec;   INR: 1.35 ratio         PTT - ( 23 Jul 2019 16:45 )  PTT:34.3 sec    CAPILLARY BLOOD GLUCOSE          RADIOLOGY & ADDITIONAL TESTS:

## 2019-07-26 NOTE — PHYSICAL THERAPY INITIAL EVALUATION ADULT - PERTINENT HX OF CURRENT PROBLEM, REHAB EVAL
89 y/oF admitted from [facility] 7/23 with melena. As per H&P, daughter stating that pt had blood work done the day pta with Hg 7.9, and has been downtrending since d/c to [facility]. 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 As per GI note 7/24, pt with recent endoscopy with avm bleed treated, on elequis with re;bleed.  (cont below)

## 2019-07-26 NOTE — PROGRESS NOTE ADULT - SUBJECTIVE AND OBJECTIVE BOX
INTERVAL HPI/OVERNIGHT EVENTS:    MEDICATIONS  (STANDING):  acetaminophen   Tablet .. 975 milliGRAM(s) Oral every 8 hours  amLODIPine   Tablet 5 milliGRAM(s) Oral daily  dextrose 5% + sodium chloride 0.9% with potassium chloride 20 mEq/L 1000 milliLiter(s) (50 mL/Hr) IV Continuous <Continuous>  docusate sodium 100 milliGRAM(s) Oral three times a day  losartan 100 milliGRAM(s) Oral daily  pantoprazole    Tablet 40 milliGRAM(s) Oral before breakfast  polyethylene glycol 3350 17 Gram(s) Oral daily  simvastatin 20 milliGRAM(s) Oral at bedtime    MEDICATIONS  (PRN):  mirtazapine 15 milliGRAM(s) Oral at bedtime PRN insomnia      Allergies    No Known Drug Allergies  Peaches (Rash)    Intolerances            PHYSICAL EXAM:   Vital Signs:  Vital Signs Last 24 Hrs  T(C): 36.6 (26 Jul 2019 05:57), Max: 36.6 (25 Jul 2019 09:23)  T(F): 97.8 (26 Jul 2019 05:57), Max: 97.9 (25 Jul 2019 18:42)  HR: 70 (26 Jul 2019 05:57) (62 - 72)  BP: 119/67 (26 Jul 2019 05:57) (119/67 - 143/62)  BP(mean): --  RR: 18 (26 Jul 2019 05:57) (18 - 19)  SpO2: 96% (26 Jul 2019 05:57) (94% - 97%)  Daily     Daily     GENERAL:  no distress  HEENT:  NC/AT,  anicteric  CHEST:   no increased effort, breath sounds clear  HEART:  Regular rhythm  ABDOMEN:  Soft, non-tender, non-distended, normoactive bowel sounds,  no masses ,no hepato-splenomegaly, no signs of chronic liver disease  EXTEREMITIES:  no cyanosis      LABS:                        10.4   5.2   )-----------( 205      ( 25 Jul 2019 22:30 )             30.9     07-26    145  |  114<H>  |  22  ----------------------------<  99  4.6   |  23  |  1.33<H>    Ca    9.3      26 Jul 2019 06:26            RADIOLOGY & ADDITIONAL TESTS:

## 2019-07-27 LAB
ANION GAP SERPL CALC-SCNC: 11 MMOL/L — SIGNIFICANT CHANGE UP (ref 5–17)
BUN SERPL-MCNC: 15 MG/DL — SIGNIFICANT CHANGE UP (ref 7–23)
CALCIUM SERPL-MCNC: 9.2 MG/DL — SIGNIFICANT CHANGE UP (ref 8.4–10.5)
CHLORIDE SERPL-SCNC: 113 MMOL/L — HIGH (ref 96–108)
CO2 SERPL-SCNC: 23 MMOL/L — SIGNIFICANT CHANGE UP (ref 22–31)
CREAT SERPL-MCNC: 1.27 MG/DL — SIGNIFICANT CHANGE UP (ref 0.5–1.3)
GLUCOSE SERPL-MCNC: 96 MG/DL — SIGNIFICANT CHANGE UP (ref 70–99)
HCT VFR BLD CALC: 28.5 % — LOW (ref 34.5–45)
HCT VFR BLD CALC: 31.2 % — LOW (ref 34.5–45)
HGB BLD-MCNC: 10.1 G/DL — LOW (ref 11.5–15.5)
HGB BLD-MCNC: 10.2 G/DL — LOW (ref 11.5–15.5)
MCHC RBC-ENTMCNC: 30.3 PG — SIGNIFICANT CHANGE UP (ref 27–34)
MCHC RBC-ENTMCNC: 32.6 PG — SIGNIFICANT CHANGE UP (ref 27–34)
MCHC RBC-ENTMCNC: 32.8 GM/DL — SIGNIFICANT CHANGE UP (ref 32–36)
MCHC RBC-ENTMCNC: 35.5 GM/DL — SIGNIFICANT CHANGE UP (ref 32–36)
MCV RBC AUTO: 91.8 FL — SIGNIFICANT CHANGE UP (ref 80–100)
MCV RBC AUTO: 92.5 FL — SIGNIFICANT CHANGE UP (ref 80–100)
PLATELET # BLD AUTO: 200 K/UL — SIGNIFICANT CHANGE UP (ref 150–400)
PLATELET # BLD AUTO: 202 K/UL — SIGNIFICANT CHANGE UP (ref 150–400)
POTASSIUM SERPL-MCNC: 4.5 MMOL/L — SIGNIFICANT CHANGE UP (ref 3.5–5.3)
POTASSIUM SERPL-SCNC: 4.5 MMOL/L — SIGNIFICANT CHANGE UP (ref 3.5–5.3)
RBC # BLD: 3.1 M/UL — LOW (ref 3.8–5.2)
RBC # BLD: 3.38 M/UL — LOW (ref 3.8–5.2)
RBC # FLD: 14.6 % — HIGH (ref 10.3–14.5)
RBC # FLD: 15 % — HIGH (ref 10.3–14.5)
SODIUM SERPL-SCNC: 147 MMOL/L — HIGH (ref 135–145)
WBC # BLD: 4.7 K/UL — SIGNIFICANT CHANGE UP (ref 3.8–10.5)
WBC # BLD: 4.9 K/UL — SIGNIFICANT CHANGE UP (ref 3.8–10.5)
WBC # FLD AUTO: 4.7 K/UL — SIGNIFICANT CHANGE UP (ref 3.8–10.5)
WBC # FLD AUTO: 4.9 K/UL — SIGNIFICANT CHANGE UP (ref 3.8–10.5)

## 2019-07-27 RX ORDER — APIXABAN 2.5 MG/1
2.5 TABLET, FILM COATED ORAL EVERY 12 HOURS
Refills: 0 | Status: DISCONTINUED | OUTPATIENT
Start: 2019-07-28 | End: 2019-07-28

## 2019-07-27 RX ADMIN — Medication 100 MILLIGRAM(S): at 21:54

## 2019-07-27 RX ADMIN — Medication 975 MILLIGRAM(S): at 06:04

## 2019-07-27 RX ADMIN — Medication 975 MILLIGRAM(S): at 06:34

## 2019-07-27 RX ADMIN — Medication 1 DROP(S): at 16:56

## 2019-07-27 RX ADMIN — LOSARTAN POTASSIUM 100 MILLIGRAM(S): 100 TABLET, FILM COATED ORAL at 06:04

## 2019-07-27 RX ADMIN — Medication 975 MILLIGRAM(S): at 13:12

## 2019-07-27 RX ADMIN — SIMVASTATIN 20 MILLIGRAM(S): 20 TABLET, FILM COATED ORAL at 21:54

## 2019-07-27 RX ADMIN — Medication 100 MILLIGRAM(S): at 13:11

## 2019-07-27 RX ADMIN — Medication 975 MILLIGRAM(S): at 13:45

## 2019-07-27 RX ADMIN — Medication 975 MILLIGRAM(S): at 22:30

## 2019-07-27 RX ADMIN — Medication 975 MILLIGRAM(S): at 21:54

## 2019-07-27 RX ADMIN — AMLODIPINE BESYLATE 5 MILLIGRAM(S): 2.5 TABLET ORAL at 06:04

## 2019-07-27 RX ADMIN — PANTOPRAZOLE SODIUM 40 MILLIGRAM(S): 20 TABLET, DELAYED RELEASE ORAL at 06:04

## 2019-07-27 RX ADMIN — DEXTROSE MONOHYDRATE, SODIUM CHLORIDE, AND POTASSIUM CHLORIDE 50 MILLILITER(S): 50; .745; 4.5 INJECTION, SOLUTION INTRAVENOUS at 06:04

## 2019-07-27 NOTE — CHART NOTE - NSCHARTNOTEFT_GEN_A_CORE
Restart Apixaban (pts home dose) in AM as d/w Dr. Lee. No active signs of bleeding, cbc has been stable. Monitor CBC daily.

## 2019-07-27 NOTE — PROGRESS NOTE ADULT - SUBJECTIVE AND OBJECTIVE BOX
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 Patient stated she had diffuse lower abdominal cramping this morning worsened prior to defecation and noticed she had melena. Patient felt like she was going to syncopize on her third bowel movement with dizziness nausea SOB lightheadedness and diaphoresis. Per daughter had blood work yesterday with hemoglobin 7.9 and has been downtrending since her discharge to [facility]. Patient complaining of right hip pain. Has had no further BMs.  Discussing Watchman procedure with cardiology.    c/o irritation of left eye - eyedrops given    MEDICATIONS  (STANDING):  acetaminophen   Tablet .. 975 milliGRAM(s) Oral every 8 hours  amLODIPine   Tablet 5 milliGRAM(s) Oral daily  dextrose 5% + sodium chloride 0.9% with potassium chloride 20 mEq/L 1000 milliLiter(s) (50 mL/Hr) IV Continuous <Continuous>  docusate sodium 100 milliGRAM(s) Oral three times a day  losartan 100 milliGRAM(s) Oral daily  pantoprazole    Tablet 40 milliGRAM(s) Oral before breakfast  polyethylene glycol 3350 17 Gram(s) Oral daily  simvastatin 20 milliGRAM(s) Oral at bedtime    MEDICATIONS  (PRN):  mirtazapine 15 milliGRAM(s) Oral at bedtime PRN insomnia      Vital Signs:  Vital Signs Last 24 Hrs  T(C): 36.5 (27 Jul 2019 10:51), Max: 36.7 (26 Jul 2019 20:06)  T(F): 97.7 (27 Jul 2019 10:51), Max: 98.1 (26 Jul 2019 20:06)  HR: 65 (27 Jul 2019 10:51) (55 - 71)  BP: 126/65 (27 Jul 2019 10:51) (121/66 - 156/71)  BP(mean): --  RR: 18 (27 Jul 2019 10:51) (18 - 18)  SpO2: 94% (27 Jul 2019 10:51) (94% - 98%)      PHYSICAL EXAM:  	GENERAL: NAD, well nourished and conversant  	HEAD:  Atraumatic  	EYES: EOM, PERRLA, conjunctiva pale and sclera white  	ENT: No tonsillar erythema, exudates, or enlargement, moist mucous membranes, good dentition, no lesions  	NECK: Supple, No JVD, normal thyroid, carotids with normal upstrokes and no bruits  	CHEST/LUNG: Clear to auscultation bilaterally, No rales, rhonchi, wheezing, or rubs  	HEART: Regular rate and rhythm, No murmurs, rubs, or gallops  	ABDOMEN: Soft, nondistended, no masses, guarding, tenderness or rebound, bowel sounds present  	EXTREMITIES:  2+ Peripheral Pulses, No clubbing, cyanosis, or edema. No arthritis of shoulders, elbows, hands, hips, knees, ankles, or feet.  (+) pelvic pain  	LYMPH: No lymphadenopathy noted  	SKIN: No rashes or lesions  NERVOUS SYSTEM:  Alert & Oriented X3, normal cognitive function. Motor Strength 5/5 right upper and right lower.       LABS:            CBC Full  -  ( 27 Jul 2019 08:19 )  WBC Count : 4.7 K/uL  RBC Count : 3.10 M/uL  Hemoglobin : 10.1 g/dL  Hematocrit : 28.5 %  Platelet Count - Automated : 200 K/uL  Mean Cell Volume : 91.8 fl  Mean Cell Hemoglobin : 32.6 pg  Mean Cell Hemoglobin Concentration : 35.5 gm/dL  Auto Neutrophil # : x  Auto Lymphocyte # : x  Auto Monocyte # : x  Auto Eosinophil # : x  Auto Basophil # : x  Auto Neutrophil % : x  Auto Lymphocyte % : x  Auto Monocyte % : x  Auto Eosinophil % : x  Auto Basophil % : x    07-27    147<H>  |  113<H>  |  15  ----------------------------<  96  4.5   |  23  |  1.27    Ca    9.2      27 Jul 2019 06:58              CBC Full  -  ( 26 Jul 2019 16:01 )  WBC Count : 4.9 K/uL  RBC Count : 3.23 M/uL  Hemoglobin : 10.0 g/dL  Hematocrit : 29.7 %  Platelet Count - Automated : 210 K/uL  Mean Cell Volume : 91.7 fl  Mean Cell Hemoglobin : 31.0 pg  Mean Cell Hemoglobin Concentration : 33.8 gm/dL  Auto Neutrophil # : x  Auto Lymphocyte # : x  Auto Monocyte # : x  Auto Eosinophil # : x  Auto Basophil # : x  Auto Neutrophil % : x  Auto Lymphocyte % : x  Auto Monocyte % : x  Auto Eosinophil % : x  Auto Basophil % : x    07-26    145  |  114<H>  |  22  ----------------------------<  99  4.6   |  23  |  1.33<H>    Ca    9.3      26 Jul 2019 06:26              CBC Full  -  ( 25 Jul 2019 05:32 )  WBC Count : 5.4 K/uL  RBC Count : 3.08 M/uL  Hemoglobin : 9.8 g/dL  Hematocrit : 28.6 %  Platelet Count - Automated : 207 K/uL  Mean Cell Volume : 92.7 fl  Mean Cell Hemoglobin : 31.6 pg  Mean Cell Hemoglobin Concentration : 34.1 gm/dL  Auto Neutrophil # : x  Auto Lymphocyte # : x  Auto Monocyte # : x  Auto Eosinophil # : x  Auto Basophil # : x  Auto Neutrophil % : x  Auto Lymphocyte % : x  Auto Monocyte % : x  Auto Eosinophil % : x  Auto Basophil % : x    07-24    141  |  107  |  47<H>  ----------------------------<  99  4.5   |  22  |  1.65<H>    Ca    9.7      24 Jul 2019 06:31  Phos  4.3     07-23  Mg     2.3     07-23    TPro  5.9<L>  /  Alb  3.1<L>  /  TBili  0.1<L>  /  DBili  x   /  AST  16  /  ALT  7<L>  /  AlkPhos  95  07-24    LIVER FUNCTIONS - ( 24 Jul 2019 06:31 )  Alb: 3.1 g/dL / Pro: 5.9 g/dL / ALK PHOS: 95 U/L / ALT: 7 U/L / AST: 16 U/L / GGT: x           PT/INR - ( 23 Jul 2019 16:45 )   PT: 15.5 sec;   INR: 1.35 ratio         PTT - ( 23 Jul 2019 16:45 )  PTT:34.3 sec    CAPILLARY BLOOD GLUCOSE          RADIOLOGY & ADDITIONAL TESTS:

## 2019-07-27 NOTE — PROGRESS NOTE ADULT - ASSESSMENT
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 Patient stated she had diffuse lower abdominal cramping this morning worsened prior to defecation and noticed she had melena. Patient felt like she was going to syncopize on her third bowel movement with dizziness nausea SOB lightheadedness and diaphoresis. Per daughter had blood work yesterday with hemoglobin 7.9 and has been downtrending since her discharge to [facility]. Patient denied CP, actual syncope, back pain, headache, weakness in extremity, N/V/D, continued abdominal pain, urinary complaints

## 2019-07-27 NOTE — PROGRESS NOTE ADULT - PROBLEM SELECTOR PLAN 6
Patient with PAF  discussed watchman procedure  Patient will still need AC with hx of carotid stenosis  Family would like Patient reevaluated at [facility] for carotid surgery
Patient with PAF  discussed watchman procedure  Patient will still need AC with hx of carotid stenosis  Family would like Patient reevaluated at [facility] for carotid surgery
Patient with PAF  discussed watchman procedure  Patient will still need AC with hx of carotid stenosis  Family would like Patient reevaluated at [facility] for carotid surgery
Patient with PAF  discussed watchman procedure  Patient will still need AC with hx of carotid stenosis  Family would like Patient reevaluated at [facility] for carotid surgery

## 2019-07-27 NOTE — PROGRESS NOTE ADULT - PROBLEM SELECTOR PLAN 5
Xray of right hip was unremarkable
Xray of right hip was unremarkable
xay of right hip and Sonogram of groin
Xray of right hip was unremarkable

## 2019-07-27 NOTE — PROGRESS NOTE ADULT - PROBLEM SELECTOR PLAN 3
Blood pressure under control  adjust meds as needed
Blood pressure under control
Blood pressure under control  adjust meds as needed

## 2019-07-27 NOTE — PROGRESS NOTE ADULT - PROBLEM SELECTOR PLAN 1
guaiac all stools  Follow H/H q 8 hour  appreciate GI eval Possible EGD today  transfuse as needed
guaiac all stools  Follow H/H q 8 hour  appreciate GI eval Possible EGD on friday  transfuse as needed
guaiac all stools  Follow H/H q 8 hour  appreciate GI eval Possible EGD today  transfuse as needed
guaiac all stools  Follow H/H q 8 hours  GI consult
guaiac all stools  Follow H/H daily  appreciate GI eval   push po intake  transfuse as needed

## 2019-07-27 NOTE — PROGRESS NOTE ADULT - PROBLEM SELECTOR PROBLEM 1
Gastrointestinal bleeding

## 2019-07-27 NOTE — PROGRESS NOTE ADULT - PROBLEM SELECTOR PROBLEM 5
Groin pain, chronic, right

## 2019-07-27 NOTE — PROGRESS NOTE ADULT - PROBLEM SELECTOR PLAN 2
Low cholesterol diet  Continue meds

## 2019-07-27 NOTE — PROGRESS NOTE ADULT - SUBJECTIVE AND OBJECTIVE BOX
INTERVAL HPI/OVERNIGHT EVENTS:    MEDICATIONS  (STANDING):  acetaminophen   Tablet .. 975 milliGRAM(s) Oral every 8 hours  amLODIPine   Tablet 5 milliGRAM(s) Oral daily  dextrose 5% + sodium chloride 0.9% with potassium chloride 20 mEq/L 1000 milliLiter(s) (50 mL/Hr) IV Continuous <Continuous>  docusate sodium 100 milliGRAM(s) Oral three times a day  losartan 100 milliGRAM(s) Oral daily  pantoprazole    Tablet 40 milliGRAM(s) Oral before breakfast  polyethylene glycol 3350 17 Gram(s) Oral daily  simvastatin 20 milliGRAM(s) Oral at bedtime    MEDICATIONS  (PRN):  mirtazapine 15 milliGRAM(s) Oral at bedtime PRN insomnia      Allergies    No Known Drug Allergies  Peaches (Rash)    Intolerances            PHYSICAL EXAM:   Vital Signs:  Vital Signs Last 24 Hrs  T(C): 36.5 (27 Jul 2019 10:51), Max: 36.7 (26 Jul 2019 20:06)  T(F): 97.7 (27 Jul 2019 10:51), Max: 98.1 (26 Jul 2019 20:06)  HR: 65 (27 Jul 2019 10:51) (55 - 71)  BP: 126/65 (27 Jul 2019 10:51) (121/66 - 156/71)  BP(mean): --  RR: 18 (27 Jul 2019 10:51) (18 - 18)  SpO2: 94% (27 Jul 2019 10:51) (94% - 98%)  Daily     Daily     GENERAL:  no distress  HEENT:  NC/AT,  anicteric  CHEST:   no increased effort, breath sounds clear  HEART:  Regular rhythm  ABDOMEN:  Soft, non-tender, non-distended, normoactive bowel sounds,  no masses ,no hepato-splenomegaly, no signs of chronic liver disease  EXTEREMITIES:  no cyanosis      LABS:                        10.1   4.7   )-----------( 200      ( 27 Jul 2019 08:19 )             28.5     07-27    147<H>  |  113<H>  |  15  ----------------------------<  96  4.5   |  23  |  1.27    Ca    9.2      27 Jul 2019 06:58            RADIOLOGY & ADDITIONAL TESTS:

## 2019-07-28 LAB
ALBUMIN SERPL ELPH-MCNC: 3 G/DL — LOW (ref 3.3–5)
ALP SERPL-CCNC: 97 U/L — SIGNIFICANT CHANGE UP (ref 40–120)
ALT FLD-CCNC: 9 U/L — LOW (ref 10–45)
ANION GAP SERPL CALC-SCNC: 11 MMOL/L — SIGNIFICANT CHANGE UP (ref 5–17)
AST SERPL-CCNC: 16 U/L — SIGNIFICANT CHANGE UP (ref 10–40)
BILIRUB SERPL-MCNC: 0.2 MG/DL — SIGNIFICANT CHANGE UP (ref 0.2–1.2)
BUN SERPL-MCNC: 13 MG/DL — SIGNIFICANT CHANGE UP (ref 7–23)
CALCIUM SERPL-MCNC: 9.1 MG/DL — SIGNIFICANT CHANGE UP (ref 8.4–10.5)
CHLORIDE SERPL-SCNC: 109 MMOL/L — HIGH (ref 96–108)
CO2 SERPL-SCNC: 23 MMOL/L — SIGNIFICANT CHANGE UP (ref 22–31)
CREAT SERPL-MCNC: 1.31 MG/DL — HIGH (ref 0.5–1.3)
GLUCOSE SERPL-MCNC: 83 MG/DL — SIGNIFICANT CHANGE UP (ref 70–99)
HCT VFR BLD CALC: 29.8 % — LOW (ref 34.5–45)
HGB BLD-MCNC: 9.1 G/DL — LOW (ref 11.5–15.5)
MCHC RBC-ENTMCNC: 29.4 PG — SIGNIFICANT CHANGE UP (ref 27–34)
MCHC RBC-ENTMCNC: 30.5 GM/DL — LOW (ref 32–36)
MCV RBC AUTO: 96.4 FL — SIGNIFICANT CHANGE UP (ref 80–100)
NT-PROBNP SERPL-SCNC: 389 PG/ML — HIGH (ref 0–300)
PLATELET # BLD AUTO: 196 K/UL — SIGNIFICANT CHANGE UP (ref 150–400)
POTASSIUM SERPL-MCNC: 4.3 MMOL/L — SIGNIFICANT CHANGE UP (ref 3.5–5.3)
POTASSIUM SERPL-SCNC: 4.3 MMOL/L — SIGNIFICANT CHANGE UP (ref 3.5–5.3)
PROT SERPL-MCNC: 5.5 G/DL — LOW (ref 6–8.3)
RBC # BLD: 3.09 M/UL — LOW (ref 3.8–5.2)
RBC # FLD: 16.3 % — HIGH (ref 10.3–14.5)
SODIUM SERPL-SCNC: 143 MMOL/L — SIGNIFICANT CHANGE UP (ref 135–145)
WBC # BLD: 5.08 K/UL — SIGNIFICANT CHANGE UP (ref 3.8–10.5)
WBC # FLD AUTO: 5.08 K/UL — SIGNIFICANT CHANGE UP (ref 3.8–10.5)

## 2019-07-28 RX ADMIN — Medication 975 MILLIGRAM(S): at 06:35

## 2019-07-28 RX ADMIN — Medication 975 MILLIGRAM(S): at 21:19

## 2019-07-28 RX ADMIN — Medication 975 MILLIGRAM(S): at 07:05

## 2019-07-28 RX ADMIN — Medication 1 DROP(S): at 19:29

## 2019-07-28 RX ADMIN — PANTOPRAZOLE SODIUM 40 MILLIGRAM(S): 20 TABLET, DELAYED RELEASE ORAL at 06:35

## 2019-07-28 RX ADMIN — POLYETHYLENE GLYCOL 3350 17 GRAM(S): 17 POWDER, FOR SOLUTION ORAL at 11:32

## 2019-07-28 RX ADMIN — Medication 1 DROP(S): at 13:45

## 2019-07-28 RX ADMIN — Medication 100 MILLIGRAM(S): at 06:35

## 2019-07-28 RX ADMIN — LOSARTAN POTASSIUM 100 MILLIGRAM(S): 100 TABLET, FILM COATED ORAL at 06:35

## 2019-07-28 RX ADMIN — SIMVASTATIN 20 MILLIGRAM(S): 20 TABLET, FILM COATED ORAL at 21:19

## 2019-07-28 RX ADMIN — APIXABAN 2.5 MILLIGRAM(S): 2.5 TABLET, FILM COATED ORAL at 08:24

## 2019-07-28 RX ADMIN — Medication 975 MILLIGRAM(S): at 13:45

## 2019-07-28 RX ADMIN — Medication 975 MILLIGRAM(S): at 13:15

## 2019-07-28 RX ADMIN — Medication 100 MILLIGRAM(S): at 21:19

## 2019-07-28 RX ADMIN — Medication 100 MILLIGRAM(S): at 13:15

## 2019-07-28 RX ADMIN — Medication 975 MILLIGRAM(S): at 21:50

## 2019-07-28 RX ADMIN — AMLODIPINE BESYLATE 5 MILLIGRAM(S): 2.5 TABLET ORAL at 06:35

## 2019-07-28 NOTE — PROGRESS NOTE ADULT - ASSESSMENT
stable, no bleeding, Hb 9.1, Eliquis restarted last night but pt states she refused  would prefer to have her off of anticoagulation for 1 week if possible given recurrent bleed and recent cautery therapy  follow CBC

## 2019-07-28 NOTE — PROGRESS NOTE ADULT - SUBJECTIVE AND OBJECTIVE BOX
Patient is a 89y old  Female who presents with a chief complaint of Bloody bowel movements (29 Jul 2019 22:17)      HPI:  c/ left eye bluning  NO fever   chills or  SOB  She decided to pursue  a Watchmkan procedure andis  awaiting a cadiac evaluation    MEDICATIONS  (STANDING):  acetaminophen   Tablet .. 975 milliGRAM(s) Oral every 8 hours  amiodarone    Tablet 200 milliGRAM(s) Oral daily  amLODIPine   Tablet 5 milliGRAM(s) Oral daily  artificial  tears Solution 1 Drop(s) Both EYES four times a day  docusate sodium 100 milliGRAM(s) Oral three times a day  losartan 100 milliGRAM(s) Oral daily  pantoprazole    Tablet 40 milliGRAM(s) Oral before breakfast  polyethylene glycol 3350 17 Gram(s) Oral daily  simvastatin 20 milliGRAM(s) Oral at bedtime    MEDICATIONS  (PRN):  artificial  tears Solution 1 Drop(s) Both EYES four times a day PRN Dry Eyes  mirtazapine 15 milliGRAM(s) Oral at bedtime PRN insomnia      Allergies    No Known Drug Allergies  Peaches (Rash)    VITALS:     PHYSICAL EXAM:   Vital Signs:  Vital Signs Last 24 Hrs  T(C): 36.6 (28 Jul 2019 06:32), Max: 36.7 (27 Jul 2019 19:28)  T(F): 97.9 (28 Jul 2019 06:32), Max: 98.1 (27 Jul 2019 19:28)  HR: 68 (28 Jul 2019 06:32) (64 - 68)  BP: 144/64 (28 Jul 2019 06:32) (120/57 - 144/64)    PHYSICAL EXAM:  GENERAL: NAD, well nourished and conversant  HEAD:  Atraumatic  EYES: EOM, PERRLA, conjunctiva pink and sclera white  ENT: No tonsillar erythema, exudates, or enlargement, moist mucous membranes, good dentition, no lesions  NECK: Supple, No JVD, normal thyroid, carotids with normal upstrokes and no bruits  CHEST/LUNG: Clear to auscultation bilaterally, No rales, rhonchi, wheezing, or rubs  HEART: Regular rate and rhythm, No murmurs, rubs, or gallops  ABDOMEN: Soft, nondistended, no masses, guarding, tenderness or rebound, bowel sounds present  EXTREMITIES:  2+ Peripheral Pulses, No clubbing, cyanosis, or edema. No arthritis of shoulders, elbows, hands, hips, knees, ankles, or feet. No DJD C spine, T spine, or L/S spine  LYMPH: No lymphadenopathy noted  SKIN: No rashes or lesions  NERVOUS SYSTEM:  Alert & Oriented X3, normal cognitive function. Motor Strength 5/5 right upper and right lower.  5/5 left upper and left lower extremities, DTRs 2+ intact and symmetric    LABS:                          9.1    5.08  )-----------( 196      ( 28 Jul 2019 10:07 )             29.8     07-28    143  |  109<H>  |  13  ----------------------------<  83  4.3   |  23  |  1.31<H>    Ca    9.1      28 Jul 2019 07:24    TPro  5.5<L>  /  Alb  3.0<L>  /  TBili  0.2  /  DBili  x   /  AST  16  /  ALT  9<L>  /  AlkPhos  97  07-28          RADIOLOGY & ADDITIONAL TESTS:      Consultant(s):    Care Discussed with Consultants/Other Providers [ ] YES  [ ] NO

## 2019-07-28 NOTE — PROGRESS NOTE ADULT - ASSESSMENT
Assessment and Plan:   · Assessment		  89 y/o female PMHz polio, HTN, HLD, Afib on Eliquis, s/p partial R side hip replacement 6/2019 c/b right sided CVA with LUE defecits and had GIB during admission requiring 4 units PRBCs brought in via EMS to the ED with melena starting today. Patient stated she had diffuse lower abdominal cramping this morning worsened prior to defecation and noticed she had melena. Patient felt like she was going to syncopize on her third bowel movement with dizziness nausea SOB lightheadedness and diaphoresis. Per daughter had blood work yesterday with hemoglobin 7.9 and has been downtrending since her discharge to [facility]. Patient denied CP, actual syncope, back pain, headache, weakness in extremity, N/V/D, continued abdominal pain, urinary complaints     Problem/Plan - 1:  ·  Problem: Gastrointestinal bleeding.  Plan: guaiac all stools  Follow H/H daily  appreciate GI eval   push po intake  transfuse as needed.      Problem/Plan - 2:  ·  Problem: High cholesterol.  Plan: Low cholesterol diet  Continue meds.      Problem/Plan - 3:  ·  Problem: Hypertension.  Plan: Blood pressure under control  adjust meds as needed.      Problem/Plan - 4:  ·  Problem: Arthritis.  Plan: cannot use NSAIDs  Use Tylenol only due to GI bleed.      Problem/Plan - 5:  ·  Problem: Groin pain, chronic, right.  Plan: Xray of right hip was unremarkable.      Problem/Plan - 6:  Problem: Afib. Plan: Patient with PAF  discussed watchman procedure  Patient will still need AC with hx of carotid stenosis    Time spent 40 minutes wtih over 50% free division   of  issues

## 2019-07-28 NOTE — PROVIDER CONTACT NOTE (MEDICATION) - ASSESSMENT
Pt AOx4, educated her on risks/benefits/purpose of medication she still refused since she is under the impression that she wasn't supposed to take this medication and refused

## 2019-07-28 NOTE — CHART NOTE - NSCHARTNOTEFT_GEN_A_CORE
Informed by RN that patient's dtr Sierra Aden asked to speak with provider.    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     S/o EGD on 7/25:    Findings:       The esophagus was normal.       The stomach was normal.       The examined duodenum was normal. small avm with heme. site of prior avm visualized, slight        ulceration       Impression:          - Normal esophagus.                       - Normal stomach.                       - Normal examined duodenum.                       avm treated with apc with good effect. would defer elequis. ? asprin or   plavix. defer to cardiology, medicine  Recommendation:      - Clear liquid diet.    Patient's dtr asked that Eliquis be dc'd 2/2 GIB    D/w Dr Conte; Eliquis stopped; Cardiology consult (p) for tomorrow re: watchman device and recc regarding anticoagulation for PAF    Note: Pt with hx carotid stenosis, therefore high risk for recurrent CVA; Daughter at bedside, both patient & family aware of high risk for CVA      RJ Paredes [id number]

## 2019-07-28 NOTE — PROGRESS NOTE ADULT - SUBJECTIVE AND OBJECTIVE BOX
INTERVAL HPI/OVERNIGHT EVENTS:    MEDICATIONS  (STANDING):  acetaminophen   Tablet .. 975 milliGRAM(s) Oral every 8 hours  amLODIPine   Tablet 5 milliGRAM(s) Oral daily  apixaban 2.5 milliGRAM(s) Oral every 12 hours  docusate sodium 100 milliGRAM(s) Oral three times a day  losartan 100 milliGRAM(s) Oral daily  pantoprazole    Tablet 40 milliGRAM(s) Oral before breakfast  polyethylene glycol 3350 17 Gram(s) Oral daily  simvastatin 20 milliGRAM(s) Oral at bedtime    MEDICATIONS  (PRN):  artificial  tears Solution 1 Drop(s) Both EYES four times a day PRN Dry Eyes  mirtazapine 15 milliGRAM(s) Oral at bedtime PRN insomnia      Allergies    No Known Drug Allergies  Peaches (Rash)    Intolerances            PHYSICAL EXAM:   Vital Signs:  Vital Signs Last 24 Hrs  T(C): 36.6 (28 Jul 2019 06:32), Max: 36.7 (27 Jul 2019 19:28)  T(F): 97.9 (28 Jul 2019 06:32), Max: 98.1 (27 Jul 2019 19:28)  HR: 68 (28 Jul 2019 06:32) (64 - 68)  BP: 144/64 (28 Jul 2019 06:32) (120/57 - 144/64)  BP(mean): --  RR: 18 (28 Jul 2019 06:32) (18 - 18)  SpO2: 96% (28 Jul 2019 06:32) (94% - 98%)  Daily     Daily     GENERAL:  no distress  HEENT:  NC/AT,  anicteric  CHEST:   no increased effort, breath sounds clear  HEART:  Regular rhythm  ABDOMEN:  Soft, non-tender, non-distended, normoactive bowel sounds,  no masses ,no hepato-splenomegaly, no signs of chronic liver disease  EXTEREMITIES:  no cyanosis      LABS:                        9.1    5.08  )-----------( 196      ( 28 Jul 2019 10:07 )             29.8     07-28    143  |  109<H>  |  13  ----------------------------<  83  4.3   |  23  |  1.31<H>    Ca    9.1      28 Jul 2019 07:24    TPro  5.5<L>  /  Alb  3.0<L>  /  TBili  0.2  /  DBili  x   /  AST  16  /  ALT  9<L>  /  AlkPhos  97  07-28          RADIOLOGY & ADDITIONAL TESTS:

## 2019-07-29 LAB
HCT VFR BLD CALC: 32.2 % — LOW (ref 34.5–45)
HGB BLD-MCNC: 9.9 G/DL — LOW (ref 11.5–15.5)
MCHC RBC-ENTMCNC: 29.6 PG — SIGNIFICANT CHANGE UP (ref 27–34)
MCHC RBC-ENTMCNC: 30.7 GM/DL — LOW (ref 32–36)
MCV RBC AUTO: 96.1 FL — SIGNIFICANT CHANGE UP (ref 80–100)
PLATELET # BLD AUTO: 193 K/UL — SIGNIFICANT CHANGE UP (ref 150–400)
RBC # BLD: 3.35 M/UL — LOW (ref 3.8–5.2)
RBC # FLD: 15.9 % — HIGH (ref 10.3–14.5)
WBC # BLD: 5.02 K/UL — SIGNIFICANT CHANGE UP (ref 3.8–10.5)
WBC # FLD AUTO: 5.02 K/UL — SIGNIFICANT CHANGE UP (ref 3.8–10.5)

## 2019-07-29 PROCEDURE — 93010 ELECTROCARDIOGRAM REPORT: CPT

## 2019-07-29 RX ORDER — AMIODARONE HYDROCHLORIDE 400 MG/1
200 TABLET ORAL ONCE
Refills: 0 | Status: COMPLETED | OUTPATIENT
Start: 2019-07-29 | End: 2019-07-29

## 2019-07-29 RX ORDER — AMIODARONE HYDROCHLORIDE 400 MG/1
200 TABLET ORAL DAILY
Refills: 0 | Status: DISCONTINUED | OUTPATIENT
Start: 2019-07-29 | End: 2019-07-29

## 2019-07-29 RX ORDER — AMIODARONE HYDROCHLORIDE 400 MG/1
200 TABLET ORAL DAILY
Refills: 0 | Status: DISCONTINUED | OUTPATIENT
Start: 2019-07-30 | End: 2019-07-31

## 2019-07-29 RX ADMIN — Medication 100 MILLIGRAM(S): at 13:00

## 2019-07-29 RX ADMIN — LOSARTAN POTASSIUM 100 MILLIGRAM(S): 100 TABLET, FILM COATED ORAL at 06:14

## 2019-07-29 RX ADMIN — Medication 1 DROP(S): at 06:15

## 2019-07-29 RX ADMIN — Medication 975 MILLIGRAM(S): at 13:01

## 2019-07-29 RX ADMIN — Medication 975 MILLIGRAM(S): at 21:24

## 2019-07-29 RX ADMIN — Medication 975 MILLIGRAM(S): at 13:45

## 2019-07-29 RX ADMIN — Medication 100 MILLIGRAM(S): at 21:24

## 2019-07-29 RX ADMIN — AMIODARONE HYDROCHLORIDE 200 MILLIGRAM(S): 400 TABLET ORAL at 21:23

## 2019-07-29 RX ADMIN — PANTOPRAZOLE SODIUM 40 MILLIGRAM(S): 20 TABLET, DELAYED RELEASE ORAL at 06:14

## 2019-07-29 RX ADMIN — AMLODIPINE BESYLATE 5 MILLIGRAM(S): 2.5 TABLET ORAL at 06:14

## 2019-07-29 RX ADMIN — Medication 100 MILLIGRAM(S): at 06:14

## 2019-07-29 RX ADMIN — Medication 975 MILLIGRAM(S): at 06:15

## 2019-07-29 RX ADMIN — SIMVASTATIN 20 MILLIGRAM(S): 20 TABLET, FILM COATED ORAL at 21:24

## 2019-07-29 RX ADMIN — Medication 1 DROP(S): at 13:00

## 2019-07-29 RX ADMIN — Medication 1 DROP(S): at 18:38

## 2019-07-29 RX ADMIN — Medication 975 MILLIGRAM(S): at 21:54

## 2019-07-29 RX ADMIN — Medication 1 DROP(S): at 21:25

## 2019-07-29 NOTE — PROGRESS NOTE ADULT - ASSESSMENT
Assessment and Plan:   · Assessment		  89 y/o female PMHz polio, HTN, HLD, Afib on Eliquis, s/p partial R side hip replacement 6/2019 c/b right sided CVA with LUE defecits and had GIB during admission requiring 4 units PRBCs brought in via EMS to the ED with melena starting today. Patient stated she had diffuse lower abdominal cramping this morning worsened prior to defecation and noticed she had melena. Patient felt like she was going to syncopize on her third bowel movement with dizziness nausea SOB lightheadedness and diaphoresis. Per daughter had blood work yesterday with hemoglobin 7.9 and has been downtrending since her discharge to [facility]. Patient denied CP, actual syncope, back pain, headache, weakness in extremity, N/V/D, continued abdominal pain, urinary complaints     Problem/Plan - 1:  ·  Problem: Gastrointestinal bleeding.  Plan: guaiac all stools  Follow H/H daily  appreciate GI eval   push po intake  transfuse as needed.      Problem/Plan - 2:  ·  Problem: High cholesterol.  Plan: Low cholesterol diet  Continue meds.      Problem/Plan - 3:  ·  Problem: Hypertension.  Plan: Blood pressure under control  adjust meds as needed.      Problem/Plan - 4:  ·  Problem: Arthritis.  Plan: cannot use NSAIDs  Use Tylenol only due to GI bleed.      Problem/Plan - 5:  ·  Problem: Groin pain, chronic, right.  Plan: Xray of right hip was unremarkable.      Problem/Plan - 6:  Problem: Afib. Plan: Patient with PAF  discussed watchman procedure  Patient will still need AC with hx of carotid stenosis    Time spent 40 minutes wtih over 50% free division   of  issues

## 2019-07-29 NOTE — PROGRESS NOTE ADULT - SUBJECTIVE AND OBJECTIVE BOX
BRAD JOAQUIN:[id number],   89yFemale followed for:  No Known Drug Allergies  Peaches (Rash)    PAST MEDICAL & SURGICAL HISTORY:  Gastrointestinal bleed  Pleural effusion on left  Splenic abscess  Polio  High cholesterol  Hypertension  History of right hip replacement  History of appendectomy    FAMILY HISTORY:  No pertinent family history in first degree relatives    MEDICATIONS  (STANDING):  acetaminophen   Tablet .. 975 milliGRAM(s) Oral every 8 hours  amLODIPine   Tablet 5 milliGRAM(s) Oral daily  docusate sodium 100 milliGRAM(s) Oral three times a day  losartan 100 milliGRAM(s) Oral daily  pantoprazole    Tablet 40 milliGRAM(s) Oral before breakfast  polyethylene glycol 3350 17 Gram(s) Oral daily  simvastatin 20 milliGRAM(s) Oral at bedtime    MEDICATIONS  (PRN):  artificial  tears Solution 1 Drop(s) Both EYES four times a day PRN Dry Eyes  mirtazapine 15 milliGRAM(s) Oral at bedtime PRN insomnia      Vital Signs Last 24 Hrs  T(C): 36.9 (29 Jul 2019 05:37), Max: 36.9 (28 Jul 2019 19:45)  T(F): 98.5 (29 Jul 2019 05:37), Max: 98.5 (29 Jul 2019 05:37)  HR: 67 (29 Jul 2019 05:37) (65 - 74)  BP: 127/74 (29 Jul 2019 05:37) (115/66 - 150/72)  BP(mean): --  RR: 18 (29 Jul 2019 05:37) (18 - 18)  SpO2: 94% (29 Jul 2019 05:37) (92% - 97%)  nc/at  s1s2  cta  soft, nt, nd no guarding or rebound  no c/c/e    CBC Full  -  ( 28 Jul 2019 10:07 )  WBC Count : 5.08 K/uL  RBC Count : 3.09 M/uL  Hemoglobin : 9.1 g/dL  Hematocrit : 29.8 %  Platelet Count - Automated : 196 K/uL  Mean Cell Volume : 96.4 fl  Mean Cell Hemoglobin : 29.4 pg  Mean Cell Hemoglobin Concentration : 30.5 gm/dL  Auto Neutrophil # : x  Auto Lymphocyte # : x  Auto Monocyte # : x  Auto Eosinophil # : x  Auto Basophil # : x  Auto Neutrophil % : x  Auto Lymphocyte % : x  Auto Monocyte % : x  Auto Eosinophil % : x  Auto Basophil % : x    07-28    143  |  109<H>  |  13  ----------------------------<  83  4.3   |  23  |  1.31<H>    Ca    9.1      28 Jul 2019 07:24    TPro  5.5<L>  /  Alb  3.0<L>  /  TBili  0.2  /  DBili  x   /  AST  16  /  ALT  9<L>  /  AlkPhos  97  07-28

## 2019-07-29 NOTE — PROGRESS NOTE ADULT - ASSESSMENT
89 y/o F with Pmhx of childhood polio, HTN, HLD, PAF on Eliquis,  S/p mechanical fall recent fall 6/2019 resulting in  right femoral neck fracture s/p right hip hemiarthroplasty with hospital course c/b acute right MCA territory infarct, severe Right ICA stenosis and GIB requiring 4 units PRBCs; s/p argon beam coagulation therapy  of bleeding  duodenal angioectasias  Now readmitted with GIB. EP consulted for Watchman evaluation. S/p EGD w/ repeat AVM treated w/ apc  this admission.   - Patient would need to be able to tolerate short term AC for Watchman device however patient reluctant  to be on AC therapy  - Attending discussed options extensively with patient and daughter who opted for AAD therapy to keep patient in SR.  - Patient to be started on Amiodarone 200mg po daily with monitoring of LFTs & TSH and outpatient PFTs and opthalmology follow up.  - No Watchman at this time.  - No further EP workup at this time.   [id number]

## 2019-07-29 NOTE — CHART NOTE - NSCHARTNOTEFT_GEN_A_CORE
Pt was on Eliquis for A Fib. Had GIB x 2 while on Eliquis. Discussed with Attending Dr Conte who recommended GI & EP input - EP for possible Wachman's procedure and GI for opinion on whether pt can be restarted on Eliquis. Per GI, pt bled twice on Eliquis and is very high risk for rebleeding if ac is restarted. Evaluated by EP and started on Amiodarone to control pt's HR.   ** Per Cardiology, t will need to be on AC for 3-4 weeks before Wachman's procedure and needs to be on AC for 5 months after procedure. Endorsed to Night NP

## 2019-07-29 NOTE — PROGRESS NOTE ADULT - SUBJECTIVE AND OBJECTIVE BOX
Patient is a 89y old  Female who presents with a chief complaint of Bloody bowel movements (29 Jul 2019 22:17)      HPI:  c/ left eye bluning  NO fever   chills or  SOB  She decided to pursue  a Watchmkan procedure andis  [facility]aiting [facility].  Family wishes to go to [facility] to see Dr Thakkar to do to he prodedure  .  Paitn ani d Family advised by GI not to  be anticoagulated.    MEDICATIONS  (STANDING):  acetaminophen   Tablet .. 975 milliGRAM(s) Oral every 8 hours  amiodarone    Tablet 200 milliGRAM(s) Oral daily  amLODIPine   Tablet 5 milliGRAM(s) Oral daily  artificial  tears Solution 1 Drop(s) Both EYES four times a day  docusate sodium 100 milliGRAM(s) Oral three times a day  losartan 100 milliGRAM(s) Oral daily  pantoprazole    Tablet 40 milliGRAM(s) Oral before breakfast  polyethylene glycol 3350 17 Gram(s) Oral daily  simvastatin 20 milliGRAM(s) Oral at bedtime    MEDICATIONS  (PRN):  artificial  tears Solution 1 Drop(s) Both EYES four times a day PRN Dry Eyes  mirtazapine 15 milliGRAM(s) Oral at bedtime PRN insomnia      Allergies    No Known Drug Allergies  Peaches (Rash)    VITALS:     PHYSICAL EXAM:   Vital Signs Last 24 Hrs  T(C): 36.9 (29 Jul 2019 20:08), Max: 36.9 (29 Jul 2019 05:37)  T(F): 98.5 (29 Jul 2019 20:08), Max: 98.5 (29 Jul 2019 05:37)  HR: 85 (29 Jul 2019 20:08) (67 - 85)  BP: 134/62 (29 Jul 2019 20:08) (115/66 - 134/62)  BP(mean): --  RR: 18 (29 Jul 2019 20:08) (18 - 18)  SpO2: 95% (29 Jul 2019 20:08) (92% - 96%)    PHYSICAL EXAM:  GENERAL: NAD, well nourished and conversant  HEAD:  Atraumatic  EYES: EOM, PERRLA, conjunctiva pink and sclera white  ENT: No tonsillar erythema, exudates, or enlargement, moist mucous membranes, good dentition, no lesions  NECK: Supple, No JVD, normal thyroid, carotids with normal upstrokes and no bruits  CHEST/LUNG: Clear to auscultation bilaterally, No rales, rhonchi, wheezing, or rubs  HEART: Regular rate and rhythm, No murmurs, rubs, or gallops  ABDOMEN: Soft, nondistended, no masses, guarding, tenderness or rebound, bowel sounds present  EXTREMITIES:  2+ Peripheral Pulses, No clubbing, cyanosis, or edema. No arthritis of shoulders, elbows, hands, hips, knees, ankles, or feet. No DJD C spine, T spine, or L/S spine  LYMPH: No lymphadenopathy noted  SKIN: No rashes or lesions  NERVOUS SYSTEM:  Alert & Oriented X3, normal cognitive function. Motor Strength 5/5 right upper and right lower.  5/5 left upper and left lower extremities, DTRs 2+ intact and symmetric    LABS:                          9.1    5.08  )-----------( 196      ( 28 Jul 2019 10:07 )             29.8     07-28    143  |  109<H>  |  13  ----------------------------<  83  4.3   |  23  |  1.31<H>    Ca    9.1      28 Jul 2019 07:24    TPro  5.5<L>  /  Alb  3.0<L>  /  TBili  0.2  /  DBili  x   /  AST  16  /  ALT  9<L>  /  AlkPhos  97  07-28          RADIOLOGY & ADDITIONAL TESTS:      Consultant(s):    Care Discussed with Consultants/Other Providers [ ] YES  [ ] NO

## 2019-07-29 NOTE — PROGRESS NOTE ADULT - SUBJECTIVE AND OBJECTIVE BOX
INTERVAL HPI/OVERNIGHT EVENTS: EP recalled for possible Watchman. Patient seen and examined; in no acute distress. She denied chest pain/sob/dizziness/palpitations.     MEDICATIONS  (STANDING):  acetaminophen   Tablet .. 975 milliGRAM(s) Oral every 8 hours  amLODIPine   Tablet 5 milliGRAM(s) Oral daily  artificial  tears Solution 1 Drop(s) Both EYES four times a day  docusate sodium 100 milliGRAM(s) Oral three times a day  losartan 100 milliGRAM(s) Oral daily  pantoprazole    Tablet 40 milliGRAM(s) Oral before breakfast  polyethylene glycol 3350 17 Gram(s) Oral daily  simvastatin 20 milliGRAM(s) Oral at bedtime    MEDICATIONS  (PRN):  artificial  tears Solution 1 Drop(s) Both EYES four times a day PRN Dry Eyes  mirtazapine 15 milliGRAM(s) Oral at bedtime PRN insomnia      Allergies    No Known Drug Allergies  Peaches (Rash)    Intolerances      ROS:  General: Pt denies fever/chills  Cardiovascular: denies chest pain/palpitations  Respiratory and Thorax: denies SOB  Gastrointestinal: states no further bloody stools.   Skin: denies rashes/sores  Hematologic: denies abnormal bleeding    Vital Signs Last 24 Hrs  T(C): 36.9 (29 Jul 2019 20:08), Max: 36.9 (29 Jul 2019 05:37)  T(F): 98.5 (29 Jul 2019 20:08), Max: 98.5 (29 Jul 2019 05:37)  HR: 85 (29 Jul 2019 20:08) (67 - 85)  BP: 134/62 (29 Jul 2019 20:08) (115/66 - 134/62)  BP(mean): --  RR: 18 (29 Jul 2019 20:08) (18 - 18)  SpO2: 95% (29 Jul 2019 20:08) (92% - 96%)    Physical Exam:  Constitutional: well developed, well nourished and in  no acute distress  Neurological: Alert & Oriented x 3, AGUIRRE   Respiratory: Breathing nonlabored; CTA bilaterally.   Cardiovascular: (+) S1 & S2, RRR  Gastrointestinal: softly distended, NT/ no rebound,  (+) BS  Extremities: +2 bilateral radial pulses. No pedal edema.   Skin:  normal skin color and pigmentation, no skin lesions noted.     LABS:                        9.9    5.02  )-----------( 193      ( 29 Jul 2019 10:15 )             32.2     07-28    143  |  109<H>  |  13  ----------------------------<  83  4.3   |  23  |  1.31<H>    Ca    9.1      28 Jul 2019 07:24    TPro  5.5<L>  /  Alb  3.0<L>  /  TBili  0.2  /  DBili  x   /  AST  16  /  ALT  9<L>  /  AlkPhos  97  07-28          EKG: SR w/ occasionally APCs 79 bpm.

## 2019-07-30 ENCOUNTER — TRANSCRIPTION ENCOUNTER (OUTPATIENT)
Age: 84
End: 2019-07-30

## 2019-07-30 LAB
T3 SERPL-MCNC: 79 NG/DL — LOW (ref 80–200)
T4 AB SER-ACNC: 5 UG/DL — SIGNIFICANT CHANGE UP (ref 4.6–12)
TSH SERPL-MCNC: 0.4 UIU/ML — SIGNIFICANT CHANGE UP (ref 0.27–4.2)
TSH SERPL-MCNC: 0.4 UIU/ML — SIGNIFICANT CHANGE UP (ref 0.27–4.2)

## 2019-07-30 PROCEDURE — 99222 1ST HOSP IP/OBS MODERATE 55: CPT

## 2019-07-30 RX ORDER — AMIODARONE HYDROCHLORIDE 400 MG/1
1 TABLET ORAL
Qty: 0 | Refills: 0 | DISCHARGE
Start: 2019-07-30

## 2019-07-30 RX ADMIN — Medication 1 APPLICATION(S): at 21:55

## 2019-07-30 RX ADMIN — LOSARTAN POTASSIUM 100 MILLIGRAM(S): 100 TABLET, FILM COATED ORAL at 06:37

## 2019-07-30 RX ADMIN — PANTOPRAZOLE SODIUM 40 MILLIGRAM(S): 20 TABLET, DELAYED RELEASE ORAL at 06:37

## 2019-07-30 RX ADMIN — Medication 975 MILLIGRAM(S): at 21:55

## 2019-07-30 RX ADMIN — Medication 975 MILLIGRAM(S): at 07:00

## 2019-07-30 RX ADMIN — Medication 100 MILLIGRAM(S): at 13:35

## 2019-07-30 RX ADMIN — Medication 975 MILLIGRAM(S): at 06:36

## 2019-07-30 RX ADMIN — Medication 975 MILLIGRAM(S): at 14:15

## 2019-07-30 RX ADMIN — Medication 975 MILLIGRAM(S): at 22:25

## 2019-07-30 RX ADMIN — Medication 100 MILLIGRAM(S): at 06:37

## 2019-07-30 RX ADMIN — AMLODIPINE BESYLATE 5 MILLIGRAM(S): 2.5 TABLET ORAL at 06:37

## 2019-07-30 RX ADMIN — Medication 1 DROP(S): at 18:31

## 2019-07-30 RX ADMIN — Medication 975 MILLIGRAM(S): at 13:35

## 2019-07-30 RX ADMIN — Medication 1 DROP(S): at 23:42

## 2019-07-30 RX ADMIN — SIMVASTATIN 20 MILLIGRAM(S): 20 TABLET, FILM COATED ORAL at 21:56

## 2019-07-30 RX ADMIN — Medication 1 DROP(S): at 06:36

## 2019-07-30 RX ADMIN — AMIODARONE HYDROCHLORIDE 200 MILLIGRAM(S): 400 TABLET ORAL at 06:37

## 2019-07-30 NOTE — DISCHARGE NOTE PROVIDER - HOSPITAL COURSE
89 y/o F with Pmhx of childhood polio, HTN, HLD, PAF on Eliquis,  S/p mechanical fall recent fall 6/2019 resulting in  right femoral neck fracture s/p right hip hemiarthroplasty with hospital course c/b acute right MCA territory infarct, severe Right ICA stenosis and GIB requiring 4 units PRBCs; s/p argon beam coagulation therapy  of bleeding  duodenal angioectasias  Now readmitted with GIB. seen by GI; hemoglobin stable. EP consulted for Watchman evaluation. S/p EGD w/ repeat AVM treated w/ apc  this admission.     - Patient would need to be able to tolerate short term AC for Watchman device however patient reluctant  to be on AC therapy    - Attending discussed options extensively with patient and daughter who opted for AAD therapy to keep patient in SR.    - started on Amiodarone 200mg po daily with monitoring of LFTs & TSH and outpatient PFTs and opthalmology follow up.    - No Watchman at this time.    - No further EP workup at this time. follow up with Dr. Leon EPS as outpatient. 89 y/o F with Pmhx of childhood polio, HTN, HLD, PAF on Eliquis,  S/p mechanical fall recent fall 6/2019 resulting in  right femoral neck fracture s/p right hip hemiarthroplasty with hospital course c/b acute right MCA territory infarct, severe Right ICA stenosis and GIB requiring 4 units PRBCs; s/p argon beam coagulation therapy  of bleeding  duodenal angioectasias  Now readmitted with GIB. seen by GI; hemoglobin stable. EP consulted for Watchman evaluation. S/p EGD w/ repeat AVM treated w/ apc  this admission.     - Patient would need to be able to tolerate short term AC for Watchman device however patient reluctant  to be on AC therapy    - Attending discussed options extensively with patient and daughter who opted for AAD therapy to keep patient in SR.    - started on Amiodarone 200mg po daily with monitoring of LFTs & TSH and outpatient PFTs and opthalmology follow up.    - No Watchman at this time.    - No further EP workup at this time. follow up with Dr. Nguyen  EPS as outpatient. 89 y/o F with Pmhx of childhood polio, HTN, HLD, PAF on Eliquis,  S/p mechanical fall recent fall 6/2019 resulting in  right femoral neck fracture s/p right hip hemiarthroplasty with hospital course c/b acute right MCA territory infarct, severe Right ICA stenosis and GIB requiring 4 units PRBCs; s/p argon beam coagulation therapy  of bleeding  duodenal angioectasias  Now readmitted with GIB. seen by GI; hemoglobin stable. EP consulted for Watchman evaluation. S/p EGD w/ repeat AVM treated w/ apc  this admission.     - Patient would need to be able to tolerate short term AC for Watchman device however patient reluctant  to be on AC therapy    - Attending discussed options extensively with patient and daughter who opted for AAD therapy to keep patient in SR.    - started on Amiodarone 200mg po daily recommended by EPS; patient refused to continue amiodarone and it was discontinue by attending MD.     - No Watchman at this time.      - No further EP workup at this time. follow up with Dr. Nguyen  EPS as outpatient.    cleared by MD for discharge to rehab. 89 y/o F with Pmhx of childhood polio, HTN, HLD, PAF on Eliquis,  S/p mechanical fall recent fall 6/2019 resulting in  right femoral neck fracture s/p right hip hemiarthroplasty with hospital course c/b acute right MCA territory infarct, severe Right ICA stenosis and GIB requiring 4 units PRBCs; s/p argon beam coagulation therapy  of bleeding  duodenal angioectasias  Now readmitted with GIB. seen by GI; hemoglobin stable. EP consulted for Watchman evaluation. S/p EGD w/ repeat AVM treated w/ apc  this admission.     - Patient would need to be able to tolerate short term AC for Watchman device however patient reluctant  to be on AC therapy    - Attending discussed options extensively with patient and daughter who opted for AAD therapy to keep patient in SR.    - started on Amiodarone 200mg po daily recommended by EPS; patient refused to continue amiodarone and it was discontinue by attending MD.     - No Watchman at this time.      - No further EP workup at this time. follow up with Dr. Nguyen  EPS as outpatient.    follow up cbc, BMP    cleared by MD for discharge to rehab.

## 2019-07-30 NOTE — CONSULT NOTE ADULT - ATTENDING COMMENTS
I have interviewed and examined the patient and reviewed the residents note including the history, exam, assessment, and plan.  I agree with the residents assessment and plan.    89 F admitted for GI bleed, c/o eyes burning in setting of known dry eye OU.  Symptoms can be explained by NOAH OU.  Pt also have RPE changes OD and a small area of RPE atrophy, suggesting possible early ARMD.    - starting preservative free artificial tears 6x/day OU and Lacrilube ointment QHS OU  - o/p f/u as scheduled  - findings and plan discussed with patient and primary team    Follow-Up:  Patient should follow up his/her ophthalmologist or in the [facility] Practice within 1 week of discharge, sooner if symptoms worsen or change.    Rina Ornelas MD

## 2019-07-30 NOTE — PROGRESS NOTE ADULT - ASSESSMENT
89 y/o F w/  Pmhx of childhood polio, HTN, HLD, PAF on Eliquis,  S/p mechanical fall recent fall 6/2019 resulting in  right femoral neck fracture s/p right hip hemiarthroplasty with hospital course c/b acute right MCA territory infarct, severe Right ICA stenosis and GIB requiring 4 units PRBCs; s/p argon beam coagulation therapy  of bleeding  duodenal angioectasias  Now readmitted with GIB. EP consulted for Watchman evaluation. S/p EGD w/ repeat AVM treated w/ apc  this admission.   - Continue with Amiodarone 200mg po daily to minimize AF burden and minimize the amount of time that she is exposed to form thrombus. Patient will require inpatient and outpatient monitoring of LFTs & TSH and outpatient PFTs and opthalmology follow up while on drug; this was reinforced with patient and  daughter, Sierra, today.   - Patient would need to be able to tolerate short term AC for Watchman device.   - Watchman is an elective procedure for which patient can  follow up as an outpatient ( call  for an appointment ) ; patient  does not need to decide now.   - No further EP workup at this time.     [id number]

## 2019-07-30 NOTE — PROGRESS NOTE ADULT - ASSESSMENT
Assessment and Plan:   · Assessment		  89 y/o female PMHz polio, HTN, HLD, Afib on Eliquis, s/p partial R side hip replacement 6/2019 c/b right sided CVA with LUE defecits and had GIB during admission requiring 4 units PRBCs brought in via EMS to the ED with melena starting today. Patient stated she had diffuse lower abdominal cramping this morning worsened prior to defecation and noticed she had melena. Patient felt like she was going to syncopize on her third bowel movement with dizziness nausea SOB lightheadedness and diaphoresis. Per daughter had blood work yesterday with hemoglobin 7.9 and has been downtrending since her discharge to [facility]. Patient denied CP, actual syncope, back pain, headache, weakness in extremity, N/V/D, continued abdominal pain, urinary complaints     Problem/Plan - 1:  ·  Problem: Gastrointestinal bleeding.  Plan: guaiac all stools  Follow H/H daily  appreciate GI eval   push po intake  transfuse as needed.      Problem/Plan - 2:  ·  Problem: High cholesterol.  Plan: Low cholesterol diet  Continue meds.      Problem/Plan - 3:  ·  Problem: Hypertension.  Plan: Blood pressure under control  adjust meds as needed.      Problem/Plan - 4:  ·  Problem: Arthritis.  Plan: cannot use NSAIDs  Use Tylenol only due to GI bleed.      Problem/Plan - 5:  ·  Problem: Groin pain, chronic, right.  Plan: Xray of right hip was unremarkable.      Problem/Plan - 6:  Problem: Afib. Plan: Patient with PAF  discussed watchman procedure  Patient will still need AC with hx of carotid stenosis    Time spent 40 minutes wtih over 50% free division   of  issues c/o left eye blurring and receiving eyedrops to improve ophthalmologic hydration.  No fever,  chills or SOB  She decided to not pursue a Watchman procedure and is awaiting return to rehabilitation, so as to resume physical therapy for  right hip fracture.  Family wishes to go to [facility] to see Dr Thakkar to reevaluate her cardiac status. [facility] has asked that amiodarone be stopped  as it is a " dangerous drug".  Risk of recurrent supraventricular tachycardia and atrial fibrillation explained to patient who adamantly wishes this drug  to be discontinued. 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

## 2019-07-30 NOTE — CONSULT NOTE ADULT - SUBJECTIVE AND OBJECTIVE BOX
[facility] Ophthalmology Consult Note    HPI: 89 y/o female PMHx polio, HTN, HLD, Afib on Eliquis, s/p partial R side hip replacement 6/2019 c/b right sided CVA with LUE defecits and had GIB during admission requiring 4 units PRBCs brought in via EMS to the ED with melena starting today. Patient stated she had diffuse lower abdominal cramping this morning worsened prior to defecation and noticed she had melena. Patient felt like she was going to syncopize on her third bowel movement with dizziness nausea SOB lightheadedness and diaphoresis. Per daughter had blood work yesterday with hemoglobin 7.9 and has been downtrending since her discharge to [facility]. Patient denied CP, actual syncope, back pain, headache, weakness in extremity, N/V/D, continued abdominal pain, urinary complaints. Pt complains of her eyes burning OS>OD with some foreign body sensation OS>OD. Similar to her known diagnosis of dry eye. Denies change in vision, flashers, floaters. Pt follows as outpatient q6months for DFE. Discomfort improved with administration of proparacaine       PMH: As above  Meds:   MEDICATIONS  (STANDING):  acetaminophen   Tablet .. 975 milliGRAM(s) Oral every 8 hours  amiodarone    Tablet 200 milliGRAM(s) Oral daily  amLODIPine   Tablet 5 milliGRAM(s) Oral daily  artificial  tears Solution 1 Drop(s) Both EYES four times a day  docusate sodium 100 milliGRAM(s) Oral three times a day  losartan 100 milliGRAM(s) Oral daily  pantoprazole    Tablet 40 milliGRAM(s) Oral before breakfast  polyethylene glycol 3350 17 Gram(s) Oral daily  simvastatin 20 milliGRAM(s) Oral at bedtime    MEDICATIONS  (PRN):  artificial  tears Solution 1 Drop(s) Both EYES four times a day PRN Dry Eyes  mirtazapine 15 milliGRAM(s) Oral at bedtime PRN insomnia  POcHx (including surgeries/lasers/trauma):  NOAH  Drops: AT TID OU  FamHx: Mac deg-brother  Social Hx: None  Allergies: NKDA    ROS:  General (neg), Vision (per HPI), Head and Neck (neg), Pulm (neg), CV (neg), GI (neg),  (neg), Musculoskeletal (neg), Skin/Integ (neg), Neuro (neg), Endocrine (neg), Heme (neg), All/Immuno (neg)    Mood and Affect Appropriate ( x ),  Oriented to Time, Place, and Person x 3 ( x )    Ophthalmology Exam    Visual acuity (sc): 20/25 OU  Pupils: PERRL OU, no APD  Ttono: 18 OU  Extraocular movements (EOMs): Full OU, no pain, no diplopia   Confrontational Visual Field (CVF):  Full OU  Color Plates: 12/12 OU    Pen Light Exam (PLE)  External:  Flat OU  Lids/Lashes/Lacrimal Ducts: Flat OU    Sclera/Conjunctiva:  W+Q OU  Cornea: Dec TBUT, diffuse spk OU  Anterior Chamber: D+F OU  Iris:  Flat OU  Lens:  Cl OU    Fundus Exam: dilated with 1% tropicamide and 2.5% phenylephrine  Approval obtained from primary team for dilation  Patient aware that pupils can remained dilated for at least 4-6 hours  Exam performed with 20D lens    Vitreous: wnl OU  Disc, cup/disc: sharp and pink, 0.4 OU  Macula:  small areas of RPE atrophy OD, wnl OS  Vessels:  wnl OU  Periphery: wnl OU    Assessment/Plan:  89 F admitted for GI bleed, c/o eyes burning in setting of known dry eye OU, ?macular degeneration  - starting preservative free artificial tears 6x/day OU and Lacrilube ointment QHS OU  - o/p f/u as scheduled  - d/w patient and team      Follow-Up:  Patient should follow up his/her ophthalmologist or in the [facility] Ophthalmology Practice within 1 week of discharge.  [address] [place] [address]  [phone]    S/D/W Dr Ornelas (attending) [facility] Ophthalmology Consult Note    HPI: 89 y/o female PMHx polio, HTN, HLD, Afib on Eliquis, s/p partial R side hip replacement 6/2019 c/b right sided CVA with LUE defecits and had GIB during admission requiring 4 units PRBCs brought in via EMS to the ED with melena starting today. Patient stated she had diffuse lower abdominal cramping this morning worsened prior to defecation and noticed she had melena. Patient felt like she was going to syncopize on her third bowel movement with dizziness nausea SOB lightheadedness and diaphoresis. Per daughter had blood work yesterday with hemoglobin 7.9 and has been downtrending since her discharge to [facility]. Patient denied CP, actual syncope, back pain, headache, weakness in extremity, N/V/D, continued abdominal pain, urinary complaints. Pt complains of her eyes burning OS>OD with some foreign body sensation OS>OD. Similar to her known diagnosis of dry eye. Denies change in vision, flashers, floaters. Pt follows as outpatient q6months for DFE. Discomfort improved with administration of proparacaine       PMH: As above  Meds:   MEDICATIONS  (STANDING):  acetaminophen   Tablet .. 975 milliGRAM(s) Oral every 8 hours  amiodarone    Tablet 200 milliGRAM(s) Oral daily  amLODIPine   Tablet 5 milliGRAM(s) Oral daily  artificial  tears Solution 1 Drop(s) Both EYES four times a day  docusate sodium 100 milliGRAM(s) Oral three times a day  losartan 100 milliGRAM(s) Oral daily  pantoprazole    Tablet 40 milliGRAM(s) Oral before breakfast  polyethylene glycol 3350 17 Gram(s) Oral daily  simvastatin 20 milliGRAM(s) Oral at bedtime    MEDICATIONS  (PRN):  artificial  tears Solution 1 Drop(s) Both EYES four times a day PRN Dry Eyes  mirtazapine 15 milliGRAM(s) Oral at bedtime PRN insomnia  POcHx (including surgeries/lasers/trauma):  NOAH  Drops: AT TID OU  FamHx: Mac deg-brother  Social Hx: None  Allergies: NKDA    ROS:  General (neg), Vision (per HPI), Head and Neck (neg), Pulm (neg), CV (neg), GI (neg),  (neg), Musculoskeletal (neg), Skin/Integ (neg), Neuro (neg), Endocrine (neg), Heme (neg), All/Immuno (neg)    Mood and Affect Appropriate ( x ),  Oriented to Time, Place, and Person x 3 ( x )    Ophthalmology Exam    Visual acuity (sc): 20/25 OU  Pupils: PERRL OU, no APD  Ttono: 18 OU  Extraocular movements (EOMs): Full OU, no pain, no diplopia   Confrontational Visual Field (CVF):  Full OU  Color Plates: 12/12 OU    Pen Light Exam (PLE)  External:  Flat OU  Lids/Lashes/Lacrimal Ducts: Flat OU    Sclera/Conjunctiva:  W+Q OU  Cornea: Dec TBUT, diffuse 1+ spk OU  Anterior Chamber: D+F OU  Iris:  Flat OU  Lens:  Cl OU    Fundus Exam: dilated with 1% tropicamide and 2.5% phenylephrine  Approval obtained from primary team for dilation  Patient aware that pupils can remained dilated for at least 4-6 hours  Exam performed with 20D lens    Vitreous: wnl OU  Disc, cup/disc: sharp and pink, 0.4 OU  Macula:  small area of RPE atrophy and RPE changes OD, wnl OS, no CNV OU  Vessels:  wnl OU  Periphery: wnl OU    Assessment/Plan:  89 F admitted for GI bleed, c/o eyes burning in setting of known dry eye OU.  Symptoms can be explained by NOAH OU.  Pt also have RPE changes OD and a small area of RPE atrophy, suggesting possible early ARMD.    - starting preservative free artificial tears 6x/day OU and Lacrilube ointment QHS OU  - o/p f/u as scheduled  - findings and plan discussed with patient and primary team    Follow-Up:  Patient should follow up his/her ophthalmologist or in the [facility] Ophthalmology Practice within 1 week of discharge, sooner if symptoms worsen or change.  [address] [facility] [place] [address]  [phone]    S/D/W Dr Ornelas (attending)

## 2019-07-30 NOTE — DISCHARGE NOTE PROVIDER - PROVIDER TOKENS
PROVIDER:[TOKEN:[[id number]]],PROVIDER:[TOKEN:[[id number]]] PROVIDER:[TOKEN:[[id number]]],PROVIDER:[TOKEN:[[id number]]],PROVIDER:[TOKEN:[[id number]],FOLLOWUP:[1 week]]

## 2019-07-30 NOTE — DISCHARGE NOTE PROVIDER - NSDCFUADDINST_GEN_ALL_CORE_FT
activity as tolerated  fall precaution  physical therapy activity as tolerated  fall precaution  physical therapy  Patient should follow up her ophthalmologist or in the [facility] Ophthalmology Practice within 1 week of discharge, sooner if symptoms worsen or change.

## 2019-07-30 NOTE — DISCHARGE NOTE PROVIDER - CARE PROVIDERS DIRECT ADDRESSES
,[email or web address] ,[email or web address]

## 2019-07-30 NOTE — PROGRESS NOTE ADULT - SUBJECTIVE AND OBJECTIVE BOX
INTERVAL HPI/OVERNIGHT EVENTS:    MEDICATIONS  (STANDING):  acetaminophen   Tablet .. 975 milliGRAM(s) Oral every 8 hours  amiodarone    Tablet 200 milliGRAM(s) Oral daily  amLODIPine   Tablet 5 milliGRAM(s) Oral daily  artificial  tears Solution 1 Drop(s) Both EYES four times a day  docusate sodium 100 milliGRAM(s) Oral three times a day  losartan 100 milliGRAM(s) Oral daily  pantoprazole    Tablet 40 milliGRAM(s) Oral before breakfast  polyethylene glycol 3350 17 Gram(s) Oral daily  simvastatin 20 milliGRAM(s) Oral at bedtime    MEDICATIONS  (PRN):  artificial  tears Solution 1 Drop(s) Both EYES four times a day PRN Dry Eyes  mirtazapine 15 milliGRAM(s) Oral at bedtime PRN insomnia      Allergies    No Known Drug Allergies  Peaches (Rash)    Intolerances            PHYSICAL EXAM:   Vital Signs:  Vital Signs Last 24 Hrs  T(C): 36.4 (30 Jul 2019 06:12), Max: 36.9 (29 Jul 2019 20:08)  T(F): 97.5 (30 Jul 2019 06:12), Max: 98.5 (29 Jul 2019 20:08)  HR: 63 (30 Jul 2019 06:12) (62 - 85)  BP: 127/54 (30 Jul 2019 06:12) (116/52 - 134/62)  BP(mean): --  RR: 18 (30 Jul 2019 06:12) (18 - 18)  SpO2: 94% (30 Jul 2019 06:12) (94% - 96%)  Daily     Daily     GENERAL:  no distress  HEENT:  NC/AT,  anicteric  CHEST:   no increased effort, breath sounds clear  HEART:  Regular rhythm  ABDOMEN:  Soft, non-tender, non-distended, normoactive bowel sounds,  no masses ,no hepato-splenomegaly, no signs of chronic liver disease  EXTEREMITIES:  no cyanosis      LABS:                        9.9    5.02  )-----------( 193      ( 29 Jul 2019 10:15 )             32.2                 RADIOLOGY & ADDITIONAL TESTS:

## 2019-07-30 NOTE — DISCHARGE NOTE PROVIDER - CARE PROVIDER_API CALL
Giles Nguyen)  Cardiac Electrophysiology; Cardiology  [address] [place] [address]  Phone: [phone]  Fax: [phone]  Follow Up Time:     George Damian (DO)  [facility]  [address] [place], [place] [address]  Phone: [phone]  Fax: [phone]  Follow Up Time: Giles Nguyen)  Cardiac Electrophysiology; Cardiology  [address] [place] [address]  Phone: [phone]  Fax: [phone]  Follow Up Time:     George Damian (DO)  Medicine  [address] [place] [address]  Phone: [phone]  Fax: [phone]  Follow Up Time:     Rina Ornelas)  Ophthalmology  [address] [place] [address] [address]  [place] [address]  Phone: [phone]  Fax: [phone]  Follow Up Time: 1 week

## 2019-07-30 NOTE — DISCHARGE NOTE PROVIDER - NSDCCPCAREPLAN_GEN_ALL_CORE_FT
PRINCIPAL DISCHARGE DIAGNOSIS  Diagnosis: Gastrointestinal bleeding  Assessment and Plan of Treatment: seen by GI; christopher endoscopy PRINCIPAL DISCHARGE DIAGNOSIS  Diagnosis: Gastrointestinal bleeding  Assessment and Plan of Treatment: seen by GI; had endoscopy;Normal esophagus.                       - Normal stomach.                       - Normal examined duodenum.                       avm treated with apc with good effect.  anticoagulation on hold      SECONDARY DISCHARGE DIAGNOSES  Diagnosis: Afib  Assessment and Plan of Treatment: anticoagulation on hold  seen by EPS and evaluated for Watchmans procedure-   patient would need to be able to tolerate short term Anticoagulation for Watchman device however patient reluctant  to be on Anticoagulation. outpatient follow up with Dr. Nguyen  started on Amiodarone 200mg po daily with monitoring of LFTs & TSH PRINCIPAL DISCHARGE DIAGNOSIS  Diagnosis: Gastrointestinal bleeding  Assessment and Plan of Treatment: seen by GI; had endoscopy;Normal esophagus.                       - Normal stomach.                       - Normal examined duodenum.                       avm treated with apc with good effect.  anticoagulation on hold      SECONDARY DISCHARGE DIAGNOSES  Diagnosis: Afib  Assessment and Plan of Treatment: anticoagulation on hold  seen by EPS and evaluated for Watchmans procedure-   patient would need to be able to tolerate short term Anticoagulation for Watchman device however patient reluctant  to be on Anticoagulation. outpatient follow up with Dr. Nguyen  started on Amiodarone 200mg po daily with monitoring of LFTs & TSH; but it was discontinued as per pt request as she does not want to take it.

## 2019-07-30 NOTE — PROGRESS NOTE ADULT - SUBJECTIVE AND OBJECTIVE BOX
Patient is a 89y old  Female who presents with a chief complaint of Bloody bowel movements (29 Jul 2019 22:17)      HPI:  c/ left eye bluning  NO fever   chills or  SOB  She decided to pursue  a Watchmkan procedure andis  awaiting a [facility] evaluation.  Family wishes to go to [facility] to see Dr Thakkar to do to he prodedure  .  Paitn ani d Family advised by GI not to  be anticoagulated.    MEDICATIONS  (STANDING):  acetaminophen   Tablet .. 975 milliGRAM(s) Oral every 8 hours  amiodarone    Tablet 200 milliGRAM(s) Oral daily  amLODIPine   Tablet 5 milliGRAM(s) Oral daily  artificial  tears Solution 1 Drop(s) Both EYES four times a day  artificial tears (preservative free) Ophthalmic Solution 1 Drop(s) Both EYES every 4 hours  docusate sodium 100 milliGRAM(s) Oral three times a day  losartan 100 milliGRAM(s) Oral daily  pantoprazole    Tablet 40 milliGRAM(s) Oral before breakfast  petrolatum Ophthalmic Ointment 1 Application(s) Both EYES at bedtime  polyethylene glycol 3350 17 Gram(s) Oral daily  simvastatin 20 milliGRAM(s) Oral at bedtime    MEDICATIONS  (PRN):  artificial  tears Solution 1 Drop(s) Both EYES four times a day PRN Dry Eyes  mirtazapine 15 milliGRAM(s) Oral at bedtime PRN insomnia    Allergies    No Known Drug Allergies  Peaches (Rash)    VITALS:     Vital Signs Last 24 Hrs  T(C): 36.6 (30 Jul 2019 16:22), Max: 36.9 (29 Jul 2019 20:08)  T(F): 97.9 (30 Jul 2019 16:22), Max: 98.5 (29 Jul 2019 20:08)  HR: 68 (30 Jul 2019 16:22) (62 - 85)  BP: 120/72 (30 Jul 2019 16:22) (120/72 - 149/63)  BP(mean): --  RR: 18 (30 Jul 2019 16:22) (18 - 18)  SpO2: 97% (30 Jul 2019 16:22) (94% - 97%)    PHYSICAL EXAM:  GENERAL: NAD, well nourished and conversant  HEAD:  Atraumatic  EYES: EOM, PERRLA, conjunctiva pink and sclera white  ENT: No tonsillar erythema, exudates, or enlargement, moist mucous membranes, good dentition, no lesions  NECK: Supple, No JVD, normal thyroid, carotids with normal upstrokes and no bruits  CHEST/LUNG: Clear to auscultation bilaterally, No rales, rhonchi, wheezing, or rubs  HEART: Regular rate and rhythm, No murmurs, rubs, or gallops  ABDOMEN: Soft, nondistended, no masses, guarding, tenderness or rebound, bowel sounds present  EXTREMITIES:  2+ Peripheral Pulses, No clubbing, cyanosis, or edema. No arthritis of shoulders, elbows, hands, hips, knees, ankles, or feet. No DJD C spine, T spine, or L/S spine  LYMPH: No lymphadenopathy noted  SKIN: No rashes or lesions  NERVOUS SYSTEM:  Alert & Oriented X3, normal cognitive function. Motor Strength 5/5 right upper and right lower.  5/5 left upper and left lower extremities, DTRs 2+ intact and symmetric    LABS:      CBC Full  -  ( 29 Jul 2019 10:15 )  WBC Count : 5.02 K/uL  RBC Count : 3.35 M/uL  Hemoglobin : 9.9 g/dL  Hematocrit : 32.2 %  Platelet Count - Automated : 193 K/uL  Mean Cell Volume : 96.1 fl  Mean Cell Hemoglobin : 29.6 pg  Mean Cell Hemoglobin Concentration : 30.7 gm/dL  Auto Neutrophil # : x  Auto Lymphocyte # : x  Auto Monocyte # : x  Auto Eosinophil # : x  Auto Basophil # : x  Auto Neutrophil % : x  Auto Lymphocyte % : x  Auto Monocyte % : x  Auto Eosinophil % : x  Auto Basophil % : x Patient is a 89y old  Female who presents with a chief complaint of Bloody bowel movements.      HPI:  c/o left eye blurring and receiving eyedrops to improve ophthalmologic hydration.  No fever,  chills or SOB  She decided to not pursue a Watchman procedure and is awaiting return to rehabilitation, so as to resume physical therapy for  right hip fracture.  Family wishes to go to [facility] to see Dr Thakkar to reevaluate her cardiac status. [facility] has asked that amiodarone be stopped  as it is a " dangerous drug".  Risk of recurrent supraventricular tachycardia and atrial fibrillation explained to patient who adamantly wishes this drug  to be discontinued. 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    MEDICATIONS  (STANDING):  acetaminophen   Tablet .. 975 milliGRAM(s) Oral every 8 hours  amiodarone    Tablet 200 milliGRAM(s) Oral daily  amLODIPine   Tablet 5 milliGRAM(s) Oral daily  artificial  tears Solution 1 Drop(s) Both EYES four times a day  artificial tears (preservative free) Ophthalmic Solution 1 Drop(s) Both EYES every 4 hours  docusate sodium 100 milliGRAM(s) Oral three times a day  losartan 100 milliGRAM(s) Oral daily  pantoprazole    Tablet 40 milliGRAM(s) Oral before breakfast  petrolatum Ophthalmic Ointment 1 Application(s) Both EYES at bedtime  polyethylene glycol 3350 17 Gram(s) Oral daily  simvastatin 20 milliGRAM(s) Oral at bedtime    MEDICATIONS  (PRN):  artificial  tears Solution 1 Drop(s) Both EYES four times a day PRN Dry Eyes  mirtazapine 15 milliGRAM(s) Oral at bedtime PRN insomnia    Allergies    No Known Drug Allergies  Peaches (Rash)    VITALS:     Vital Signs Last 24 Hrs  T(C): 36.6 (30 Jul 2019 16:22), Max: 36.9 (29 Jul 2019 20:08)  T(F): 97.9 (30 Jul 2019 16:22), Max: 98.5 (29 Jul 2019 20:08)  HR: 68 (30 Jul 2019 16:22) (62 - 85)  BP: 120/72 (30 Jul 2019 16:22) (120/72 - 149/63)  BP(mean): --  RR: 18 (30 Jul 2019 16:22) (18 - 18)  SpO2: 97% (30 Jul 2019 16:22) (94% - 97%)    PHYSICAL EXAM:  GENERAL: NAD, well nourished and conversant  HEAD:  Atraumatic  EYES: EOM, PERRLA, conjunctiva pink and sclera white  ENT: No tonsillar erythema, exudates, or enlargement, moist mucous membranes, good dentition, no lesions  NECK: Supple, No JVD, normal thyroid, carotids with normal upstrokes and no bruits  CHEST/LUNG: Clear to auscultation bilaterally, No rales, rhonchi, wheezing, or rubs  HEART: Regular rate and rhythm, No murmurs, rubs, or gallops  ABDOMEN: Soft, nondistended, no masses, guarding, tenderness or rebound, bowel sounds present  EXTREMITIES:  2+ Peripheral Pulses, No clubbing, cyanosis, or edema. No arthritis of shoulders, elbows, hands, hips, knees, ankles, or feet. No DJD C spine, T spine, or L/S spine  LYMPH: No lymphadenopathy noted  SKIN: No rashes or lesions  NERVOUS SYSTEM:  Alert & Oriented X3, normal cognitive function. Motor Strength 5/5 right upper and right lower.  5/5 left upper and left lower extremities, DTRs 2+ intact and symmetric    LABS:      CBC Full  -  ( 29 Jul 2019 10:15 )  WBC Count : 5.02 K/uL  RBC Count : 3.35 M/uL  Hemoglobin : 9.9 g/dL  Hematocrit : 32.2 %  Platelet Count - Automated : 193 K/uL  Mean Cell Volume : 96.1 fl  Mean Cell Hemoglobin : 29.6 pg  Mean Cell Hemoglobin Concentration : 30.7 gm/dL  Auto Neutrophil # : x  Auto Lymphocyte # : x  Auto Monocyte # : x  Auto Eosinophil # : x  Auto Basophil # : x  Auto Neutrophil % : x  Auto Lymphocyte % : x  Auto Monocyte % : x  Auto Eosinophil % : x  Auto Basophil % : x

## 2019-07-30 NOTE — PROGRESS NOTE ADULT - SUBJECTIVE AND OBJECTIVE BOX
INTERVAL HPI/OVERNIGHT EVENTS: Patient seen and examined; in no acute distress. She denied chest pain/sob/ dizziness/palpitations.     MEDICATIONS  (STANDING):  acetaminophen   Tablet .. 975 milliGRAM(s) Oral every 8 hours  amiodarone    Tablet 200 milliGRAM(s) Oral daily  amLODIPine   Tablet 5 milliGRAM(s) Oral daily  artificial  tears Solution 1 Drop(s) Both EYES four times a day  docusate sodium 100 milliGRAM(s) Oral three times a day  losartan 100 milliGRAM(s) Oral daily  pantoprazole    Tablet 40 milliGRAM(s) Oral before breakfast  polyethylene glycol 3350 17 Gram(s) Oral daily  simvastatin 20 milliGRAM(s) Oral at bedtime    MEDICATIONS  (PRN):  artificial  tears Solution 1 Drop(s) Both EYES four times a day PRN Dry Eyes  mirtazapine 15 milliGRAM(s) Oral at bedtime PRN insomnia      Allergies  No Known Drug Allergies  Peaches (Rash)    Intolerances      ROS:  General: Pt denies fevers/ constitutional discomfort   Cardiovascular: denies chest pain/palpitations.   Respiratory and Thorax: denies SOB  Gastrointestinal: denies abdominal pain/diarrhea/bloody stool  Genitourinary: denies dysuria/ hematuria   Hematologic: denies abnormal bleeding    Vital Signs Last 24 Hrs  T(C): 36.5 (30 Jul 2019 12:40), Max: 36.9 (29 Jul 2019 20:08)  T(F): 97.7 (30 Jul 2019 12:40), Max: 98.5 (29 Jul 2019 20:08)  HR: 64 (30 Jul 2019 12:40) (62 - 85)  BP: 149/63 (30 Jul 2019 12:40) (116/52 - 149/63)  BP(mean): --  RR: 18 (30 Jul 2019 12:40) (18 - 18)  SpO2: 95% (30 Jul 2019 12:40) (94% - 95%)    Physical Exam:  Constitutional: well developed, well nourished and in no acute distress  Neurological: Alert & Oriented x 3,  AGUIRRE   Respiratory: Breathing nonlabored; CTA bilaterally.   Cardiovascular: (+) S1 & S2, RRR  Gastrointestinal: soft, NT/ no rebound, (+) BS  Extremities: +2 bilateral radial pulses. No pedal edema.   Skin:  normal skin color and pigmentation, no skin lesions    LABS:                        9.9    5.02  )-----------( 193      ( 29 Jul 2019 10:15 )             32.2       RADIOLOGY & ADDITIONAL TESTS: < from: Transthoracic Echocardiogram (06.25.19 @ 15:20) >    Patient name: BRAD NUÑEZ  YOB: 1925   Age: 89 (F)   MR#: [id number]  Study Date: 6/25/2019  Location: [address] [place], [place] [address]Njtofaexgfq: Aleksandar Pacheco RDCS  Study quality: Technically fair  Referring Physician: Tyson Barraza MD  BloodPressure: 124/67 mmHg  Height: 165 cm  Weight: 83 kg  BSA: 1.9 m2  Heart Rhythm: Normal sinus  ------------------------------------------------------------------------  PROCEDURE: Transthoracic echocardiogram with 2-D, M-Mode  and complete spectral and color flow Doppler.  INDICATION: Unspecified atrial fibrillation (I48.91)  ------------------------------------------------------------------------  Dimensions:    Normal Values:  LA:     3.2    2.0 - 4.0 cm  Ao:     3.0    2.0 - 3.8 cm  SEPTUM: 1.2    0.6 - 1.2 cm  PWT:    1.0    0.6 - 1.1 cm  LVIDd:  4.3    3.0 - 5.6 cm  LVIDs:  2.8    1.8 - 4.0 cm  Derived variables:  LVMI: 86 g/m2  RWT: 0.46  EF (Visual Estimate): 70 %  Doppler Peak Velocity (m/sec): AoV=2.3  ------------------------------------------------------------------------  Observations:  Mitral Valve: Mitral annular calcification. Mild mitral  valve thickening.  Minimal mitral regurgitation.  Aortic Valve/Aorta: Fibrocalcific aortic valve without  stenosis.  Normal aortic root size. (Ao: 3.0 cm at the sinuses of  Valsalva).  Left Atrium: Normal left atrium.  LA volume index = 33  cc/m2.  Left Ventricle: Normal left ventricular systolic function.  No segmental wall motion abnormalities. Normal left  ventricular internal dimensions and wall thicknesses.  Right Heart: Normal right atrium. Normal right ventricular  size and function. Normal tricuspid valve. Mild tricuspid  regurgitation. Normal pulmonic valve.  Pericardium/Pleura: Normal pericardium with no pericardial  effusion.  Hemodynamic: Estimated right atrial pressure is normal.  Pulmonary artery pressure is normal.  Agitated saline injection demonstrates no evidence of a  patent foramen ovale.  ------------------------------------------------------------------------  Conclusions:  Normal left ventricular systolic function. No segmental  wall motion abnormalities.  Agitated saline injection demonstrates no evidence of a  patent foramen ovale.  ------------------------------------------------------------------------  Confirmed on  6/25/2019 - 17:59:30 by Jonh Rader M.D.  ------------------------------------------------------------------------

## 2019-07-31 ENCOUNTER — TRANSCRIPTION ENCOUNTER (OUTPATIENT)
Age: 84
End: 2019-07-31

## 2019-07-31 VITALS
HEART RATE: 59 BPM | RESPIRATION RATE: 18 BRPM | OXYGEN SATURATION: 97 % | DIASTOLIC BLOOD PRESSURE: 68 MMHG | SYSTOLIC BLOOD PRESSURE: 127 MMHG | TEMPERATURE: 98 F

## 2019-07-31 PROBLEM — K92.2 GASTROINTESTINAL HEMORRHAGE, UNSPECIFIED: Chronic | Status: ACTIVE | Noted: 2019-07-23

## 2019-07-31 LAB
ANION GAP SERPL CALC-SCNC: 12 MMOL/L — SIGNIFICANT CHANGE UP (ref 5–17)
BUN SERPL-MCNC: 24 MG/DL — HIGH (ref 7–23)
CALCIUM SERPL-MCNC: 9.1 MG/DL — SIGNIFICANT CHANGE UP (ref 8.4–10.5)
CHLORIDE SERPL-SCNC: 107 MMOL/L — SIGNIFICANT CHANGE UP (ref 96–108)
CO2 SERPL-SCNC: 23 MMOL/L — SIGNIFICANT CHANGE UP (ref 22–31)
CREAT SERPL-MCNC: 1.41 MG/DL — HIGH (ref 0.5–1.3)
GLUCOSE SERPL-MCNC: 93 MG/DL — SIGNIFICANT CHANGE UP (ref 70–99)
HCT VFR BLD CALC: 29.6 % — LOW (ref 34.5–45)
HGB BLD-MCNC: 10.3 G/DL — LOW (ref 11.5–15.5)
MCHC RBC-ENTMCNC: 31.4 PG — SIGNIFICANT CHANGE UP (ref 27–34)
MCHC RBC-ENTMCNC: 34.7 GM/DL — SIGNIFICANT CHANGE UP (ref 32–36)
MCV RBC AUTO: 90.5 FL — SIGNIFICANT CHANGE UP (ref 80–100)
PLATELET # BLD AUTO: 212 K/UL — SIGNIFICANT CHANGE UP (ref 150–400)
POTASSIUM SERPL-MCNC: 3.9 MMOL/L — SIGNIFICANT CHANGE UP (ref 3.5–5.3)
POTASSIUM SERPL-SCNC: 3.9 MMOL/L — SIGNIFICANT CHANGE UP (ref 3.5–5.3)
RBC # BLD: 3.28 M/UL — LOW (ref 3.8–5.2)
RBC # FLD: 14.4 % — SIGNIFICANT CHANGE UP (ref 10.3–14.5)
SODIUM SERPL-SCNC: 142 MMOL/L — SIGNIFICANT CHANGE UP (ref 135–145)
WBC # BLD: 5.7 K/UL — SIGNIFICANT CHANGE UP (ref 3.8–10.5)
WBC # FLD AUTO: 5.7 K/UL — SIGNIFICANT CHANGE UP (ref 3.8–10.5)

## 2019-07-31 PROCEDURE — 84100 ASSAY OF PHOSPHORUS: CPT

## 2019-07-31 PROCEDURE — 84436 ASSAY OF TOTAL THYROXINE: CPT

## 2019-07-31 PROCEDURE — 84295 ASSAY OF SERUM SODIUM: CPT

## 2019-07-31 PROCEDURE — 82272 OCCULT BLD FECES 1-3 TESTS: CPT

## 2019-07-31 PROCEDURE — 97530 THERAPEUTIC ACTIVITIES: CPT

## 2019-07-31 PROCEDURE — 73502 X-RAY EXAM HIP UNI 2-3 VIEWS: CPT

## 2019-07-31 PROCEDURE — 82947 ASSAY GLUCOSE BLOOD QUANT: CPT

## 2019-07-31 PROCEDURE — 85730 THROMBOPLASTIN TIME PARTIAL: CPT

## 2019-07-31 PROCEDURE — 80048 BASIC METABOLIC PNL TOTAL CA: CPT

## 2019-07-31 PROCEDURE — 80053 COMPREHEN METABOLIC PANEL: CPT

## 2019-07-31 PROCEDURE — 93005 ELECTROCARDIOGRAM TRACING: CPT

## 2019-07-31 PROCEDURE — 99291 CRITICAL CARE FIRST HOUR: CPT | Mod: 25

## 2019-07-31 PROCEDURE — 86850 RBC ANTIBODY SCREEN: CPT

## 2019-07-31 PROCEDURE — 84443 ASSAY THYROID STIM HORMONE: CPT

## 2019-07-31 PROCEDURE — 86901 BLOOD TYPING SEROLOGIC RH(D): CPT

## 2019-07-31 PROCEDURE — 82435 ASSAY OF BLOOD CHLORIDE: CPT

## 2019-07-31 PROCEDURE — 82803 BLOOD GASES ANY COMBINATION: CPT

## 2019-07-31 PROCEDURE — 97110 THERAPEUTIC EXERCISES: CPT

## 2019-07-31 PROCEDURE — 36430 TRANSFUSION BLD/BLD COMPNT: CPT

## 2019-07-31 PROCEDURE — 86900 BLOOD TYPING SEROLOGIC ABO: CPT

## 2019-07-31 PROCEDURE — 85027 COMPLETE CBC AUTOMATED: CPT

## 2019-07-31 PROCEDURE — 84480 ASSAY TRIIODOTHYRONINE (T3): CPT

## 2019-07-31 PROCEDURE — 97161 PT EVAL LOW COMPLEX 20 MIN: CPT

## 2019-07-31 PROCEDURE — 83605 ASSAY OF LACTIC ACID: CPT

## 2019-07-31 PROCEDURE — P9016: CPT

## 2019-07-31 PROCEDURE — 82962 GLUCOSE BLOOD TEST: CPT

## 2019-07-31 PROCEDURE — 82330 ASSAY OF CALCIUM: CPT

## 2019-07-31 PROCEDURE — 85610 PROTHROMBIN TIME: CPT

## 2019-07-31 PROCEDURE — 83735 ASSAY OF MAGNESIUM: CPT

## 2019-07-31 PROCEDURE — 96374 THER/PROPH/DIAG INJ IV PUSH: CPT

## 2019-07-31 PROCEDURE — 84132 ASSAY OF SERUM POTASSIUM: CPT

## 2019-07-31 PROCEDURE — 83880 ASSAY OF NATRIURETIC PEPTIDE: CPT

## 2019-07-31 PROCEDURE — 86923 COMPATIBILITY TEST ELECTRIC: CPT

## 2019-07-31 PROCEDURE — 85014 HEMATOCRIT: CPT

## 2019-07-31 RX ADMIN — Medication 1 DROP(S): at 05:56

## 2019-07-31 RX ADMIN — Medication 1 DROP(S): at 11:17

## 2019-07-31 RX ADMIN — Medication 975 MILLIGRAM(S): at 13:40

## 2019-07-31 RX ADMIN — Medication 1 DROP(S): at 13:10

## 2019-07-31 RX ADMIN — LOSARTAN POTASSIUM 100 MILLIGRAM(S): 100 TABLET, FILM COATED ORAL at 05:58

## 2019-07-31 RX ADMIN — Medication 1 DROP(S): at 09:03

## 2019-07-31 RX ADMIN — Medication 1 DROP(S): at 09:04

## 2019-07-31 RX ADMIN — Medication 975 MILLIGRAM(S): at 05:58

## 2019-07-31 RX ADMIN — AMLODIPINE BESYLATE 5 MILLIGRAM(S): 2.5 TABLET ORAL at 05:58

## 2019-07-31 RX ADMIN — Medication 975 MILLIGRAM(S): at 13:10

## 2019-07-31 RX ADMIN — PANTOPRAZOLE SODIUM 40 MILLIGRAM(S): 20 TABLET, DELAYED RELEASE ORAL at 05:57

## 2019-07-31 NOTE — PROGRESS NOTE ADULT - PROVIDER SPECIALTY LIST ADULT
Electrophysiology
Electrophysiology
Gastroenterology
Internal Medicine
Gastroenterology
Gastroenterology
Internal Medicine

## 2019-07-31 NOTE — PROGRESS NOTE ADULT - SUBJECTIVE AND OBJECTIVE BOX
Patient is a 89y old  Female who presents with a chief complaint of Bloody bowel movements.      HPI:  c/o left eye blurring and receiving eyedrops to improve ophthalmologic hydration.  No fever,  chills or SOB  She decided to not pursue a Watchman procedure and is awaiting return to rehabilitation, so as to resume physical therapy for  right hip fracture.  Family wishes to go to [facility] to see Dr Thakkar to reevaluate her cardiac status. [facility] has asked that amiodarone be stopped  as it is a " dangerous drug".  Risk of recurrent supraventricular tachycardia and atrial fibrillation explained to patient who adamantly wishes this drug  to be discontinued. 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  saw patient prior to transfer to rehab  Patietn not a candidate for Watchman  procedure due to age and refusal to take anticoagulation.  Patient also resues to go for carotid surgry for high grade stenosis and is refusing to take her amiodarone.  THE CONSEQUENCES OF HER DECISION WERE EXPLAINED TO BOTH THE PATIENT AND HER DAUGHTER  - CVA, RAPID A FIB,  DVT ,PE AND DEATH.    MEDICATIONS  (STANDING):  acetaminophen   Tablet .. 975 milliGRAM(s) Oral every 8 hours  amiodarone    Tablet 200 milliGRAM(s) Oral daily  amLODIPine   Tablet 5 milliGRAM(s) Oral daily  artificial  tears Solution 1 Drop(s) Both EYES four times a day  artificial tears (preservative free) Ophthalmic Solution 1 Drop(s) Both EYES every 4 hours  docusate sodium 100 milliGRAM(s) Oral three times a day  losartan 100 milliGRAM(s) Oral daily  pantoprazole    Tablet 40 milliGRAM(s) Oral before breakfast  petrolatum Ophthalmic Ointment 1 Application(s) Both EYES at bedtime  polyethylene glycol 3350 17 Gram(s) Oral daily  simvastatin 20 milliGRAM(s) Oral at bedtime    MEDICATIONS  (PRN):  artificial  tears Solution 1 Drop(s) Both EYES four times a day PRN Dry Eyes  mirtazapine 15 milliGRAM(s) Oral at bedtime PRN insomnia    Allergies    No Known Drug Allergies  Peaches (Rash)    VITALS:     Vital Signs Last 24 Hrs  T(C): 36.4 (31 Jul 2019 13:23), Max: 36.8 (31 Jul 2019 00:08)  T(F): 97.5 (31 Jul 2019 13:23), Max: 98.2 (31 Jul 2019 00:08)  HR: 59 (31 Jul 2019 13:23) (59 - 77)  BP: 127/68 (31 Jul 2019 13:23) (127/68 - 152/59)  BP(mean): --  RR: 18 (31 Jul 2019 13:23) (18 - 18)  SpO2: 97% (31 Jul 2019 13:23) (93% - 97%)    PHYSICAL EXAM:  GENERAL: NAD, well nourished and conversant  HEAD:  Atraumatic  EYES: EOM, PERRLA, conjunctiva pink and sclera white  ENT: No tonsillar erythema, exudates, or enlargement, moist mucous membranes, good dentition, no lesions  NECK: Supple, No JVD, normal thyroid, carotids with normal upstrokes and no bruits  CHEST/LUNG: Clear to auscultation bilaterally, No rales, rhonchi, wheezing, or rubs  HEART: Regular rate and rhythm, No murmurs, rubs, or gallops  ABDOMEN: Soft, nondistended, no masses, guarding, tenderness or rebound, bowel sounds present  EXTREMITIES:  2+ Peripheral Pulses, No clubbing, cyanosis, or edema. No arthritis of shoulders, elbows, hands, hips, knees, ankles, or feet. No DJD C spine, T spine, or L/S spine  LYMPH: No lymphadenopathy noted  SKIN: No rashes or lesions  NERVOUS SYSTEM:  Alert & Oriented X3, normal cognitive function. Motor Strength 5/5 right upper and right lower.  5/5 left upper and left lower extremities, DTRs 2+ intact and symmetric    LABS:   STABLE      CBC Full  -  ( 31 Jul 2019 07:04 )  WBC Count : 5.7 K/uL  RBC Count : 3.28 M/uL  Hemoglobin : 10.3 g/dL  Hematocrit : 29.6 %  Platelet Count - Automated : 212 K/uL  Mean Cell Volume : 90.5 fl  Mean Cell Hemoglobin : 31.4 pg  Mean Cell Hemoglobin Concentration : 34.7 gm/dL  Auto Neutrophil # : x  Auto Lymphocyte # : x  Auto Monocyte # : x  Auto Eosinophil # : x  Auto Basophil # : x  Auto Neutrophil % : x  Auto Lymphocyte % : x  Auto Monocyte % : x  Auto Eosinophil % : x  Auto Basophil % : x    07-31    142  |  107  |  24<H>  ----------------------------<  93  3.9   |  23  |  1.41<H>    Ca    9.1      31 Jul 2019 07:02

## 2019-07-31 NOTE — PROGRESS NOTE ADULT - REASON FOR ADMISSION
Bloody bowel movements
bloody stool
Bloody bowel movements
black stool and anemia

## 2019-07-31 NOTE — PROGRESS NOTE ADULT - SUBJECTIVE AND OBJECTIVE BOX
BRAD JOAQUIN:[id number],   89yFemale followed for:  No Known Drug Allergies  Peaches (Rash)    PAST MEDICAL & SURGICAL HISTORY:  Gastrointestinal bleed  Pleural effusion on left  Splenic abscess  Polio  High cholesterol  Hypertension  History of right hip replacement  History of appendectomy    FAMILY HISTORY:  No pertinent family history in first degree relatives    MEDICATIONS  (STANDING):  acetaminophen   Tablet .. 975 milliGRAM(s) Oral every 8 hours  amLODIPine   Tablet 5 milliGRAM(s) Oral daily  artificial  tears Solution 1 Drop(s) Both EYES four times a day  artificial tears (preservative free) Ophthalmic Solution 1 Drop(s) Both EYES every 4 hours  docusate sodium 100 milliGRAM(s) Oral three times a day  losartan 100 milliGRAM(s) Oral daily  pantoprazole    Tablet 40 milliGRAM(s) Oral before breakfast  petrolatum Ophthalmic Ointment 1 Application(s) Both EYES at bedtime  polyethylene glycol 3350 17 Gram(s) Oral daily  simvastatin 20 milliGRAM(s) Oral at bedtime    MEDICATIONS  (PRN):  artificial  tears Solution 1 Drop(s) Both EYES four times a day PRN Dry Eyes  mirtazapine 15 milliGRAM(s) Oral at bedtime PRN insomnia      Vital Signs Last 24 Hrs  T(C): 36.5 (31 Jul 2019 05:54), Max: 36.8 (31 Jul 2019 00:08)  T(F): 97.7 (31 Jul 2019 05:54), Max: 98.2 (31 Jul 2019 00:08)  HR: 77 (31 Jul 2019 05:54) (60 - 77)  BP: 152/59 (31 Jul 2019 05:54) (120/72 - 152/59)  BP(mean): --  RR: 18 (31 Jul 2019 05:54) (18 - 18)  SpO2: 96% (31 Jul 2019 05:54) (93% - 97%)  nc/at  s1s2  cta  soft, nt, nd no guarding or rebound  no c/c/e    CBC Full  -  ( 31 Jul 2019 07:04 )  WBC Count : 5.7 K/uL  RBC Count : 3.28 M/uL  Hemoglobin : 10.3 g/dL  Hematocrit : 29.6 %  Platelet Count - Automated : 212 K/uL  Mean Cell Volume : 90.5 fl  Mean Cell Hemoglobin : 31.4 pg  Mean Cell Hemoglobin Concentration : 34.7 gm/dL  Auto Neutrophil # : x  Auto Lymphocyte # : x  Auto Monocyte # : x  Auto Eosinophil # : x  Auto Basophil # : x  Auto Neutrophil % : x  Auto Lymphocyte % : x  Auto Monocyte % : x  Auto Eosinophil % : x  Auto Basophil % : x    07-31    142  |  107  |  24<H>  ----------------------------<  93  3.9   |  23  |  1.41<H>    Ca    9.1      31 Jul 2019 07:02

## 2019-07-31 NOTE — DISCHARGE NOTE NURSING/CASE MANAGEMENT/SOCIAL WORK - NSDCDPATPORTLINK_GEN_ALL_CORE
You can access the [facility] Portal, offered by [facility], by registering with the following website: [email or web address]

## 2019-07-31 NOTE — PROGRESS NOTE ADULT - ATTENDING COMMENTS
GI bleeding appears to be controlled and the patient is now in normal sinus rhythm, off anticoagulants.  She has no recurrent DVT on recent Doppler study.  She complains of inability to walk after right hip fracture and requires return to rehabilitation, so is she can resume physical therapy and walk.  She wishes not proceed with watchman procedure,, at this time. after comprehensive discussion and explanation,, amiodarone has been discontinued at patient's request  2 proceed with rehabilitation.  Arrangements and transfer upon bed availability.  Right carotid endarterectomy is still needed BUT PATIENT IS REFUSING.
seen and examined with NP. I agree with H & P, A & P.  I had a very lengthy discussion with the patient, daughter, and son-in-law.  We discussed that there is a theoretical risk for stroke that is high given age, history of hypertension, CVA, and vascular disease which needs to be balanced against the actual risk of bleeding (2 hospitalizations in 2 months on oral AC).  Additional history of mechanical fall.  She is NOT a candidate for long term AC.      While there is certainly data on CHARLENE occlusion with the Watchman device in such patients, current recommendations require oral AC AND aspirin x 45 days followed by aspirin AND Plavix until 6 months.  Furthermore this is not a class I recommendation, ie the data is not robust and its benefit is debated.  We discussed possible KATHRYN to assess her candidacy.  She does have a low burden of arrhythmia and we discussed possible amio to minimize burden and minimize the amount of time that she is exposed to form thrombus.  Black boxed warning was discussed.     I explained that CHARLENE occlusion is an elective procedure and she can follow up as an outpatient, ie does not need to decide now.
GI bleeding appears to be controlled and the patient is now in normal sinus rhythm, off anticoagulants.  She has no recurrent DVT on recent Doppler study.  She complains of inability to walk after right hip fracture and requires return to rehabilitation, so is she can resume physical therapy and walk.  She wishes not proceed with watchman procedure,, at this time. after comprehensive discussion and explanation,, amiodarone has been discontinued at patient's request  2 proceed with rehabilitation.  Arrangements and transfer upon bed availability.  Right carotid endarterectomy is still needed

## 2019-07-31 NOTE — PROGRESS NOTE ADULT - ASSESSMENT
c/o left eye blurring and receiving eyedrops to improve ophthalmologic hydration.  No fever,  chills or SOB  She decided to not pursue a Watchman procedure and is awaiting return to rehabilitation, so as to resume physical therapy for  right hip fracture.  Family wishes to go to [facility] to see Dr Thakkar to reevaluate her cardiac status. [facility] has asked that amiodarone be stopped  as it is a " dangerous drug".  Risk of recurrent supraventricular tachycardia and atrial fibrillation explained to patient who adamantly wishes this drug  to be discontinued. 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

## 2019-07-31 NOTE — PROGRESS NOTE ADULT - NSHPATTENDINGPLANDISCUSS_GEN_ALL_CORE
patient and staff
Patient and family
Patient and DAUGHTER
patient and staff

## 2019-07-31 NOTE — CHART NOTE - NSCHARTNOTEFT_GEN_A_CORE
follow up- cleared by MD for discharge to rehab; discussed discharge medication/follow up.  Navya Street(NP)  [facility], [phone]

## 2019-08-19 ENCOUNTER — INBOUND DOCUMENT (OUTPATIENT)
Age: 84
End: 2019-08-19

## 2020-01-02 NOTE — PROGRESS NOTE ADULT - SUBJECTIVE AND OBJECTIVE BOX
Patient/Father/Mother/Chart(s) Patient is a 89y old  Female who presents with a chief complaint of hip fracture.  s/p mech fall in the kitchen onto R hip when right leg gave way, now c/o severe right hip pain and inability to ambulate.  Patient denies headstrike or LOC. Localizes pain to right hip/femur/knee. Patient denies radiation of pain. Patient denies numbness/tingling/burning in the RLE. No other bone/joint complaints. Patient is a community ambulator at baseline with a rolling walker. Patient has no issues w/ ADLs/IADLs. The patient underwent a right hemiarthroplasty ORIF surgery on 6/22/2019. The patient is now postop, feeling well and complaining of moderate right hip incisional pain. When last seen on the morning of 6/23/19, she is wanted to be out of bed to chair and begin ambulation. She had an uneventful day until 6:30 PM when she noted weakness of her left hand and altered ability to move her fingers.  Her daughter noticed facial weakness and slurring of words.  No staff was notified and the patient remained this way until next examined by physical therapy on the morning of 6/24/19.  She was found to have  significant left hemiparesis of the left arm greater than left leg, with facial droop and difficulty articulating.  The patient was alert and oriented x3 and she answered questions appropriately.  She had clear weakness in the left arm and left leg as compared to the day before.  EKG was obtained and she was in normal sinus rhythm,  with no ST-T wave changes. Blood pressure was low between 100- 120 systolic.  Blood pressure medications were held, unless blood pressure was greater than 130 systolic.  The patient had a neurological consultation and  right brain CVA was suspected.  The patient underwent CT of the brain at this time showed no hemorrhages or masses.  There was no hydrocephalus.  Possibility of infarction was present and an MRI was advised.  After the CT scan of the brain the patient received 325 mg of aspirin.  It was now approximately 16 hours after the onset of left hemiparesis.  The patient was transferred to a monitored bed to rule out  episodic paroxysmal atrial fibrillation that could have caused her  acute CVA.  The patient had previously been on 2.5 mg Eliquis twice a day and dosage wasnow increased to 5 mg twice a day for therapeutic anticoagulation.  Her rhythm has remained  NSR with a pulse of 64.  The patient has no pain and with the exception of left-sided weakness, has no changes in her condition.  She complains bitterly of right leg postoperative pain  The patient has no new medical complaints at this time. 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  The patient and daughter were fully  informed of right carotid critical stenosis. Telemetry continues with NSR  and no arrhythmias. Patient seen resting comfortably. speech is minimally slurred and Patient is moving all extremities. Patient found to have worsening anemia. Pt states she has been feeling well wants to get OOB and move    MEDICATIONS  (STANDING):  acetaminophen   Tablet .. 975 milliGRAM(s) Oral every 8 hours  amLODIPine   Tablet 5 milliGRAM(s) Oral daily  apixaban 5 milliGRAM(s) Oral two times a day  aspirin 325 milliGRAM(s) Oral daily  docusate sodium 100 milliGRAM(s) Oral three times a day  lidocaine   Patch 1 Patch Transdermal every 24 hours  losartan 100 milliGRAM(s) Oral daily  oxybutynin 5 milliGRAM(s) Oral daily  pantoprazole    Tablet 40 milliGRAM(s) Oral two times a day  polyethylene glycol 3350 17 Gram(s) Oral daily  senna 2 Tablet(s) Oral at bedtime  simvastatin 20 milliGRAM(s) Oral at bedtime    MEDICATIONS  (PRN):  bisacodyl Suppository 10 milliGRAM(s) Rectal daily PRN If no bowel movement  magnesium hydroxide Suspension 30 milliLiter(s) Oral daily PRN Constipation  ondansetron Injectable 4 milliGRAM(s) IV Push every 6 hours PRN Nausea and/or Vomiting          VITALS:   T(C): 36.8 (07-05-19 @ 11:00), Max: 36.9 (07-04-19 @ 21:36)  HR: 72 (07-05-19 @ 15:30) (68 - 78)  BP: 138/68 (07-05-19 @ 15:30) (108/65 - 138/68)  RR: 18 (07-05-19 @ 15:30) (18 - 18)  SpO2: 97% (07-05-19 @ 15:30) (96% - 98%)  Wt(kg): --      PHYSICAL EXAM:  GENERAL: NAD, well nourished and conversant  HEAD:  Atraumatic  EYES: EOM, PERRLA, conjunctiva pink and sclera white  ENT: No tonsillar erythema, exudates, or enlargement, moist mucous membranes, good dentition, no lesions  NECK: Supple, No JVD, normal thyroid, carotids with normal upstrokes and no bruits  CHEST/LUNG: Clear to auscultation bilaterally, No rales, rhonchi, wheezing, or rubs  HEART: Regular rate and rhythm, No murmurs, rubs, or gallops  ABDOMEN: Soft, nondistended, no masses, guarding, tenderness or rebound, bowel sounds present  EXTREMITIES:  swelling of right LE  right groins          LYMPH: No lymphadenopathy noted  SKIN: No rashes or lesions  NERVOUS SYSTEM:  Alert & Oriented X3, normal cognitive function. Motor Strength 5/5 right upper and right lower.  5/5 left upper and left lower extremities, DTRs 2+ intact and symmetric    LABS:        CBC Full  -  ( 05 Jul 2019 06:48 )  WBC Count : 7.3 K/uL  RBC Count : 2.94 M/uL  Hemoglobin : 9.1 g/dL  Hematocrit : 27.0 %  Platelet Count - Automated : 347 K/uL  Mean Cell Volume : 91.7 fl  Mean Cell Hemoglobin : 30.9 pg  Mean Cell Hemoglobin Concentration : 33.6 gm/dL  Auto Neutrophil # : x  Auto Lymphocyte # : x  Auto Monocyte # : x  Auto Eosinophil # : x  Auto Basophil # : x  Auto Neutrophil % : x  Auto Lymphocyte % : x  Auto Monocyte % : x  Auto Eosinophil % : x  Auto Basophil % : x    07-05    143  |  110<H>  |  52<H>  ----------------------------<  98  4.4   |  24  |  1.51<H>    Ca    9.0      05 Jul 2019 06:48  Phos  4.1     07-05  Mg     2.1     07-05    TPro  5.4<L>  /  Alb  2.9<L>  /  TBili  0.3  /  DBili  <0.1  /  AST  12  /  ALT  6<L>  /  AlkPhos  104  07-05    LIVER FUNCTIONS - ( 05 Jul 2019 06:48 )  Alb: 2.9 g/dL / Pro: 5.4 g/dL / ALK PHOS: 104 U/L / ALT: 6 U/L / AST: 12 U/L / GGT: x               CAPILLARY BLOOD GLUCOSE          RADIOLOGY & ADDITIONAL TESTS:

## 2020-04-24 NOTE — PRE-OP CHECKLIST - ALLERGIES REVIEWED
Lexis Nelson is a 63 year old female presenting with sore throat duration of three days.    Patient c/c of SOB on Wednesday, knees to ankles very painful, and dry cough.     Temp taken at home reading at 100 F.    Medication verified, no changes  Patient would like communication of their results via:       Cell Phone:   Telephone Information:   Mobile [phone]     Okay to leave a message containing results? Yes    Letter    Health Maintenance Due   Topic Date Due   • Shingles Vaccine (1 of 2) 10/31/2006   • Colorectal Cancer Screening-Colonoscopy  07/01/2015     Patient is due for the topics as listed above and wishes to proceed with them. Education provided for Immunization(s) Shingles..     done

## 2020-07-02 NOTE — OCCUPATIONAL THERAPY INITIAL EVALUATION ADULT - VISUAL ASSESSMENT: SCANNING
Chief Complaint:   Foot Injury (right foot, stepped on nail)      History of Present Illness   Source of history provided by:  patient and relative(s). Kati Sarkar is a 12 y.o. old male presenting to express care for evaluation of right foot pain starting a couple hours prior to arrival. Pt states he stepped on a jaleesa nail that went through his shoe and into his foot. Reports associated no swelling or bruising but there is some pain. Denies any paresthesias, knee pain, weakness, fever, chills, or abrasions. Pt states there is mild pain with ambulation. Has been taking nothing for the symptoms. Denies any hx of previous injuries or surgeries at the site. Unsure of exact last tetanus shot. Review of Systems   Unless otherwise stated in this report or unable to obtain because of the patient's clinical or mental status as evidenced by the medical record, this patients's positive and negative responses for Review of Systems, constitutional, psych, eyes, ENT, cardiovascular, respiratory, gastrointestinal, neurological, genitourinary, musculoskeletal, integument systems and systems related to the presenting problem are either stated in the preceding or were negative for the symptoms and/or complaints related to the medical problem. Past Medical History:  has no past medical history on file. Past Surgical History:  has no past surgical history on file. Social History:    Family History: family history is not on file. Allergies: Patient has no known allergies. Physical Exam   Constitutional:  Alert, development consistent with age. Neck:  Normal ROM. Supple. Chest: Heart RRR without pathologic murmurs or gallops. Lungs CTAB without W/R/R. Foot: right            Tenderness: To mid plantar aspect of foot            Swelling: none noted              Deformity: No obvious deformity. ROM: Full ROM             Skin:  No rash, abrasions, or erythema noted.  Small puncture wound WFL

## 2020-07-15 ENCOUNTER — EMERGENCY (EMERGENCY)
Facility: HOSPITAL | Age: 85
LOS: 1 days | Discharge: HOME | End: 2020-07-15
Attending: STUDENT IN AN ORGANIZED HEALTH CARE EDUCATION/TRAINING PROGRAM
Payer: MEDICARE

## 2020-07-15 VITALS
RESPIRATION RATE: 18 BRPM | HEART RATE: 75 BPM | DIASTOLIC BLOOD PRESSURE: 73 MMHG | SYSTOLIC BLOOD PRESSURE: 171 MMHG | OXYGEN SATURATION: 97 %

## 2020-07-15 VITALS
HEIGHT: 66 IN | RESPIRATION RATE: 20 BRPM | SYSTOLIC BLOOD PRESSURE: 160 MMHG | WEIGHT: 177.91 LBS | OXYGEN SATURATION: 94 % | DIASTOLIC BLOOD PRESSURE: 80 MMHG | HEART RATE: 70 BPM | TEMPERATURE: 98 F

## 2020-07-15 DIAGNOSIS — Z98.890 OTHER SPECIFIED POSTPROCEDURAL STATES: Chronic | ICD-10-CM

## 2020-07-15 DIAGNOSIS — Z96.641 PRESENCE OF RIGHT ARTIFICIAL HIP JOINT: Chronic | ICD-10-CM

## 2020-07-15 DIAGNOSIS — Z90.49 ACQUIRED ABSENCE OF OTHER SPECIFIED PARTS OF DIGESTIVE TRACT: Chronic | ICD-10-CM

## 2020-07-15 LAB
ALBUMIN SERPL ELPH-MCNC: 3.8 G/DL — SIGNIFICANT CHANGE UP (ref 3.3–5)
ALP SERPL-CCNC: 105 U/L — SIGNIFICANT CHANGE UP (ref 40–120)
ALT FLD-CCNC: 9 U/L — LOW (ref 10–45)
ANION GAP SERPL CALC-SCNC: 11 MMOL/L — SIGNIFICANT CHANGE UP (ref 5–17)
APTT BLD: 43.7 SEC — HIGH (ref 27.5–35.5)
AST SERPL-CCNC: 23 U/L — SIGNIFICANT CHANGE UP (ref 10–40)
BASOPHILS # BLD AUTO: 0.07 K/UL — SIGNIFICANT CHANGE UP (ref 0–0.2)
BASOPHILS NFR BLD AUTO: 1.3 % — SIGNIFICANT CHANGE UP (ref 0–2)
BILIRUB SERPL-MCNC: 0.2 MG/DL — SIGNIFICANT CHANGE UP (ref 0.2–1.2)
BUN SERPL-MCNC: 36 MG/DL — HIGH (ref 7–23)
CALCIUM SERPL-MCNC: 9.9 MG/DL — SIGNIFICANT CHANGE UP (ref 8.4–10.5)
CHLORIDE SERPL-SCNC: 105 MMOL/L — SIGNIFICANT CHANGE UP (ref 96–108)
CO2 SERPL-SCNC: 26 MMOL/L — SIGNIFICANT CHANGE UP (ref 22–31)
CREAT SERPL-MCNC: 1.4 MG/DL — HIGH (ref 0.5–1.3)
CRP SERPL-MCNC: 1.04 MG/DL — HIGH (ref 0–0.4)
EOSINOPHIL # BLD AUTO: 0.37 K/UL — SIGNIFICANT CHANGE UP (ref 0–0.5)
EOSINOPHIL NFR BLD AUTO: 7 % — HIGH (ref 0–6)
GLUCOSE SERPL-MCNC: 113 MG/DL — HIGH (ref 70–99)
HCT VFR BLD CALC: 41.2 % — SIGNIFICANT CHANGE UP (ref 34.5–45)
HGB BLD-MCNC: 12.8 G/DL — SIGNIFICANT CHANGE UP (ref 11.5–15.5)
IMM GRANULOCYTES NFR BLD AUTO: 0.2 % — SIGNIFICANT CHANGE UP (ref 0–1.5)
INR BLD: 1.89 RATIO — HIGH (ref 0.88–1.16)
LYMPHOCYTES # BLD AUTO: 2.03 K/UL — SIGNIFICANT CHANGE UP (ref 1–3.3)
LYMPHOCYTES # BLD AUTO: 38.2 % — SIGNIFICANT CHANGE UP (ref 13–44)
MCHC RBC-ENTMCNC: 30.1 PG — SIGNIFICANT CHANGE UP (ref 27–34)
MCHC RBC-ENTMCNC: 31.1 GM/DL — LOW (ref 32–36)
MCV RBC AUTO: 96.9 FL — SIGNIFICANT CHANGE UP (ref 80–100)
MONOCYTES # BLD AUTO: 0.55 K/UL — SIGNIFICANT CHANGE UP (ref 0–0.9)
MONOCYTES NFR BLD AUTO: 10.4 % — SIGNIFICANT CHANGE UP (ref 2–14)
NEUTROPHILS # BLD AUTO: 2.28 K/UL — SIGNIFICANT CHANGE UP (ref 1.8–7.4)
NEUTROPHILS NFR BLD AUTO: 42.9 % — LOW (ref 43–77)
NRBC # BLD: 0 /100 WBCS — SIGNIFICANT CHANGE UP (ref 0–0)
PLATELET # BLD AUTO: 221 K/UL — SIGNIFICANT CHANGE UP (ref 150–400)
POTASSIUM SERPL-MCNC: 4.5 MMOL/L — SIGNIFICANT CHANGE UP (ref 3.5–5.3)
POTASSIUM SERPL-SCNC: 4.5 MMOL/L — SIGNIFICANT CHANGE UP (ref 3.5–5.3)
PROT SERPL-MCNC: 7 G/DL — SIGNIFICANT CHANGE UP (ref 6–8.3)
PROTHROM AB SERPL-ACNC: 21.8 SEC — HIGH (ref 10.6–13.6)
RBC # BLD: 4.25 M/UL — SIGNIFICANT CHANGE UP (ref 3.8–5.2)
RBC # FLD: 13.9 % — SIGNIFICANT CHANGE UP (ref 10.3–14.5)
SODIUM SERPL-SCNC: 142 MMOL/L — SIGNIFICANT CHANGE UP (ref 135–145)
WBC # BLD: 5.31 K/UL — SIGNIFICANT CHANGE UP (ref 3.8–10.5)
WBC # FLD AUTO: 5.31 K/UL — SIGNIFICANT CHANGE UP (ref 3.8–10.5)

## 2020-07-15 PROCEDURE — 99284 EMERGENCY DEPT VISIT MOD MDM: CPT | Mod: CS,GC

## 2020-07-15 PROCEDURE — 70450 CT HEAD/BRAIN W/O DYE: CPT | Mod: 26

## 2020-07-15 PROCEDURE — 93010 ELECTROCARDIOGRAM REPORT: CPT

## 2020-07-15 RX ORDER — ACETAMINOPHEN 500 MG
650 TABLET ORAL ONCE
Refills: 0 | Status: COMPLETED | OUTPATIENT
Start: 2020-07-15 | End: 2020-07-15

## 2020-07-15 NOTE — ED ADULT NURSE NOTE - CHIEF COMPLAINT QUOTE
blurry vision left eye/ from [facility] (covid unit) was covid + May 16, retested last week, no results available.

## 2020-07-15 NOTE — ED ADULT NURSE NOTE - OBJECTIVE STATEMENT
89 female brought in by ambulance from assisted living for 4 days of left eye burning and blurred vision. denies recent head trauma (fell out of wheelchair 5 weeks ago, but no more recent falls). Denies CP, SOB, dizziness. No abd pain, has no other complaints other than eye/vision. VSS with HTN. hx. stroke, global weakness, pt. states she is not independently ambulatory at baseline. PEERL, no nystagmus, no FB visible to either eye. pt. is very well appearing, alert and oriented x 4.

## 2020-07-15 NOTE — CONSULT NOTE ADULT - ASSESSMENT
Assessment and Recommendations:  89y female w/ pmhx/ochx of afib, htn, bilateral cataract surgery over 25 years ago, and severe dry eye consulted for decreased vision of the left eye noticed incidentally and burning pain c/w her dry eye, found to have evidence of dryness on examination. Fundus exam ***    1. Dry eye syndrome of both eyes  - artificial tears, one drop to both eyes, 4 times a day  - artificial tear ointment, a small amount into both eyes, twice a day    2. s/p cataract surgery OU  - seeing well overall, glasses over one year old, recommend follow-up with ophthalmologist for refraction as outpatient    3. Retinal pigmentary changes of right eye  - recommended last year for outpatient follow-up and testing/workup for possible early macular degeneration  - recommend seeing an ophthalmologist, this was stressed to the patient that she should have yearly dilated exams    Outpatient follow-up: Patient should follow-up with his/her ophthalmologist or with [facility] Department of Ophthalmology within 1 week of after discharge at:    [address]  [place] [address]  [phone]    Kirill Zarate MD, PGY-III  Pager: [phone]/[facility]: [phone]  Also available on Microsoft Teams Assessment and Recommendations:  89y female w/ pmhx/ochx of afib, htn, bilateral cataract surgery over 25 years ago, and severe dry eye consulted for decreased vision of the left eye noticed incidentally and burning pain c/w her dry eye, found to have evidence of dryness on examination. Fundus exam consistent with examination 1 year prior, retinal pigmented epithelium changes of the right eye possible representing macular degeneration. Confirmed history and reviewed recommendations with patient's daughter Sierra over the phone ([phone])    1. Dry eye syndrome of both eyes  - artificial tears, one drop to both eyes, 4 times a day  - artificial tear ointment, a small amount into both eyes, twice a day (morning and before bedtime)    2. s/p cataract surgery OU  - seeing well overall, glasses about 6 months old, recommend follow-up with ophthalmologist     3. Retinal pigmentary changes of right eye  - recommended last year for outpatient follow-up and testing/workup for possible early macular degeneration  - daughter reports patient saw her ophthalmologist 6 months ago, has been having difficulty obtaining follow-up with nursing home coordination  - currently taking vitamins for macular degeneration    Outpatient follow-up: Patient should follow-up with his/her ophthalmologist or with [facility] Department of Ophthalmology within 1 week of after discharge at:    [address]  [place] [address]  [phone]    Kirill Zarate MD, PGY-III  Pager: [phone]/[facility]: [phone]  Also available on [employer] Teams

## 2020-07-15 NOTE — ED ADULT NURSE NOTE - INTERVENTIONS DEFINITIONS
Non-slip footwear when patient is off stretcher/Provide visual cue, wrist band, yellow gown, etc./Physically safe environment: no spills, clutter or unnecessary equipment/Stretcher in lowest position, wheels locked, appropriate side rails in place/Instruct patient to call for assistance/Monitor gait and stability

## 2020-07-15 NOTE — ED ADULT NURSE NOTE - CCCP TRG CHIEF CMPLNT
blurry vision left eye/ from [facility] (covid unit) was covid + May 16, retested last week, no results available.

## 2020-07-15 NOTE — ED PROVIDER NOTE - NSFOLLOWUPINSTRUCTIONS_ED_ALL_ED_FT
You were seen for dry eye.     To Nursing staff:  Please apply dry eye drops 4 times per day.   Please apply dry eye ointment 2 times per day.     You must follow up with your eye doctor within a week.     Seek immediate medical assistance for any new or worsening symptoms such as worsening vision.

## 2020-07-15 NOTE — ED PROVIDER NOTE - PROGRESS NOTE DETAILS
Memo VILLALOBOS: Patient seen at bedside, no acute neurological deficits other than left sided blurry vision. IOP measured with attending, Ayo VILLALOBOS. Memo VILLALOBOS: spoke with ophtho resident on call who asks that we complete fluoroscein exam before they come see her. Woods lamp exam pending. Memo VILLALOBOS: Fluoroscein exam by woods lamp performed with Dr. García. Exam negative. Ophtho called again, will come in to see the patient.

## 2020-07-15 NOTE — ED PROVIDER NOTE - CHPI ED SYMPTOMS NEG
no photophobia/no purulent drainage/no discharge/no drainage/no eye lid swelling/no foreign body/no itching

## 2020-07-15 NOTE — ED PROVIDER NOTE - CCCP TRG CHIEF CMPLNT
blurry vision left eye/ from [facility] (covid unit) was covid + May 16, retested last week, no results available. eye vision change/blurry vision left eye/ from [facility] (covid unit) was covid + May 16, retested last week, no results available.

## 2020-07-15 NOTE — ED PROVIDER NOTE - PSH
History of appendectomy    History of right hip replacement    Status post spinal surgery  lumbar region

## 2020-07-15 NOTE — ED PROVIDER NOTE - CLINICAL SUMMARY MEDICAL DECISION MAKING FREE TEXT BOX
[Patient Intake Form Reviewed] : Patient intake form was reviewed 89 yo F with 3 days of constant unilateral blurry vision ass'd with burning pain. Symptoms concerning for glaucoma, CRAO or CRVO, acute CVA. CT head ordered. IOPs obtained. Ophtho called. 89 yo F with 3 days of constant unilateral blurry vision ass'd with burning pain. Symptoms concerning for glaucoma, CRAO or CRVO, acute CVA. CT head ordered to r/o acute ICH. IOPs obtained. Ophtho called. CBC, CMP and coags ordered. 89 yo F with 3 days of constant unilateral blurry vision ass'd with burning pain. Symptoms concerning for glaucoma, CRAO or CRVO, acute CVA. CT head ordered to r/o acute ICH. IOPs obtained. Ophtho called. CBC, CMP and coags ordered.

## 2020-07-15 NOTE — ED ADULT TRIAGE NOTE - CHIEF COMPLAINT QUOTE
blurry vision left eye/ from [facility] (covid unit) was covid + May 16, retested last week, no results available.

## 2020-07-15 NOTE — ED PROVIDER NOTE - ATTENDING CONTRIBUTION TO CARE
Attending MD Rollins:   I personally have seen and examined this patient.  ACP, Resident, medical student note reviewed and agree on plan of care and except where noted.     89y F PMH Afib on eliquis, HTN, HLD, CVA in past brought in by EMS from nursing home for blurry vision left eye since Sunday associated with burning sensation. Wears glasses.    On exam, patient is well appearing, no acute distress, clear conjunctiva, lungs clear to auscultation bilaterally, appears to breathe comfortably, RRR S1S2, no peripheral edema, equal bilateral pulses in arms and legs, abdomen soft non-tender, no rebound or guarding, no spinal deformity, no CVA tenderness, moving all 4 extremities without obvious impairment to ROM, no obvious weakness, A+O x 3, fluid speech, normal affect, skin warm and well perfused, no diffuse rash, OD 20/50 OS 20/70 OU 20/40, IOP left 13, IOP right 12.    Differential includes but is not limited to glaucoma, CRAO/CRVO, CVA though outside the window for tPA, GCA. Will obtain, labs, CT, ophtho consult.

## 2020-07-15 NOTE — ED PROVIDER NOTE - PATIENT PORTAL LINK FT
You can access the FollowMyHealth Patient Portal offered by [facility] by registering at the following website: [email or web address]. By joining [facility]’s FollowMyHealth portal, you will also be able to view your health information using other applications (apps) compatible with our system.

## 2020-07-15 NOTE — CONSULT NOTE ADULT - SUBJECTIVE AND OBJECTIVE BOX
[facility] DEPARTMENT OF OPHTHALMOLOGY - INITIAL ADULT CONSULT  -----------------------------------------------------------------------------------------------------------------  Kirill Zarate MD PGY-III  Pager: [phone]/[facility]: [phone]  -----------------------------------------------------------------------------------------------------------------    HPI: 89 year old female, hx of afib, htn, bilateral cataract surgery over 25 years ago, and severe dry eye presents with decreased vision of the left eye for 3 days. Patient reports on sunday, she was doing her usual activities in her nursing home, and covered her right eye briefly when she noticed her vision was blurry. She uncovered and checked the right eye by itself and her vision was fine, then covered the right eye again and found the left eye to still be blurry. She denies checking her eyes individually previously. She reports the nursing home gives her artificial tears only twice a day and sometimes forgets as well. She denies any trauma, new floaters, flashes of light, curtain vision loss, or eye pain besides her burning symptoms which are typical of her dry eye, and currently resolved at the time of exam due to recent instillation of eye drops (tetracaine).     PAST MEDICAL & SURGICAL HISTORY:  Atrial fibrillation, unspecified type: patient on eliquis  Gastrointestinal bleed  Pleural effusion on left  Splenic abscess  Polio  High cholesterol  Hypertension  Status post spinal surgery: lumbar region  History of right hip replacement  History of appendectomy    Past Ocular History: EMILY OU, seen 1 year ago by our service, noted to have RPE changes and possible atrophy of right eye suggestive of possible early ARMD. Also with severe dry eye at that time, recommended to use tears SIX times a day and ointment twice a day.    FAMILY HISTORY: denies    Social History: from nursing home    Ophthalmic Medications: tears twice a day (nursing home forgets)    Allergies & Intolerances:   Peaches (Rash)    Review of Systems:  Constitutional: No fever, chills  Eyes: No  flashes, new floaters, FBS, erythema, discharge, double vision, OU  Neuro: No tremors  Cardiovascular: No chest pain, palpitations  Respiratory: No SOB, no cough  GI: No nausea, vomiting, abdominal pain  : No dysuria  Skin: no rash  Psych: no depression  Endocrine: no polyuria, polydipsia  Heme/lymph: no swelling    VITALS: T(C): 36.8 (07-15-20 @ 19:30)  T(F): 98.2 (07-15-20 @ 19:30), Max: 98.4 (07-15-20 @ 18:20)  HR: 68 (07-15-20 @ 22:27) (68 - 74)  BP: 184/89 (07-15-20 @ 22:27) (160/80 - 184/89)  RR:  (18 - 20)  SpO2:  (94% - 96%)  Wt(kg): --  General: AAO x 3, appropriate mood and affect    Ophthalmology Exam:  Visual acuity (cc at near): 20/25 OD 20/40 OS  Pupils: PERRL OU, no APD  Ttono: 12 OU  Extraocular movements (EOMs): Full OU, no pain, no diplopia  Confrontational Visual Field (CVF): Full OU  Color Plates: 12/12 OU    Pen Light Exam (PLE)  External: Flat OU  Lids/Lashes/Lacrimal Ducts: Flat OU    Sclera/Conjunctiva: W+Q OU  Cornea: 3+ spk OU  Anterior Chamber: D+F OU    Iris: Flat OU  Lens: PCIOL OU    Fundus Exam: dilated with 1% tropicamide and 2.5% phenylephrine  Approval obtained from primary team for dilation  Patient aware that pupils can remained dilated for at least 4-6 hours  Exam performed with 20D lens    Vitreous: wnl OU  Disc, cup/disc: sharp and pink, 0.4 OU  Macula:  small area of RPE atrophy and RPE changes OD, wnl OS   Vessels:  wnl OU  Periphery: wnl OU      Labs/Imaging:  < from: CT Head No Cont (07.15.20 @ 18:35) >    EXAM:  CT BRAIN                            PROCEDURE DATE:  07/15/2020            INTERPRETATION:  CLINICAL INFORMATION: Left eye burning, left eye blurry vision    TECHNIQUE: Noncontrast axial CT images were acquired through the head. Two-dimensional sagittal and coronal reformats were generated.    COMPARISON STUDY: CT 6/24/2019, MRI brain  6/24/2019.    FINDINGS:     Unchanged enlargement of the sulci greater than expected for age present with volume loss.,. 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 Redemonstration, unchanged punctate calcifications correlate with any prior infectious/inflammatory change. No new acute intra or extra axial hemorrhage or midline shift. Basal cisterns remain patent.    Absent orbital lenses with cataract surgery. Calvarium demonstrateslucency and sclerosis without interval change from prior. The visualized paranasal sinuses, mastoid air cells and middle ear cavities are clear.    The soft tissues of the scalp are unremarkable. The calvarium is intact.    IMPRESSION:     Redemonstration, volume loss progressive microvascular disease, age indeterminate lacunar infarcts, no hemorrhage or midline shift. If symptoms persist consider follow-up head CT or MRI if no contraindications.                RUBÉN KO M.D., RADIOLOGY RESIDENT  This document has been electronically signed.  SARAH LYNN M.D., ATTENDING RADIOLOGIST  This document has been electronically signed. Jul 15 2020  7:11PM                < end of copied text > [facility] DEPARTMENT OF OPHTHALMOLOGY - INITIAL ADULT CONSULT  -----------------------------------------------------------------------------------------------------------------  Kirill Zarate MD PGY-III  Pager: [phone]/[facility]: [phone]  -----------------------------------------------------------------------------------------------------------------    HPI: 89 year old female, hx of afib, htn, bilateral cataract surgery over 25 years ago, and severe dry eye presents with decreased vision of the left eye for 3 days. Patient reports on sunday, she was doing her usual activities in her nursing home, and covered her right eye briefly when she noticed her vision was blurry. She uncovered and checked the right eye by itself and her vision was fine, then covered the right eye again and found the left eye to still be blurry. She denies checking her eyes individually previously. She reports the nursing home gives her artificial tears only twice a day and sometimes forgets as well. She denies any trauma, new floaters, flashes of light, curtain vision loss, or eye pain besides her burning symptoms which are typical of her dry eye, and currently resolved at the time of exam due to recent instillation of eye drops (tetracaine).     PAST MEDICAL & SURGICAL HISTORY:  Atrial fibrillation, unspecified type: patient on eliquis  Gastrointestinal bleed  Pleural effusion on left  Splenic abscess  Polio  High cholesterol  Hypertension  Status post spinal surgery: lumbar region  History of right hip replacement  History of appendectomy    Past Ocular History: EMILY OU, seen 1 year ago by our service, noted to have RPE changes and possible atrophy of right eye suggestive of possible early ARMD. Also with severe dry eye at that time, recommended to use tears SIX times a day and ointment twice a day.    FAMILY HISTORY: denies    Social History: from nursing home    Ophthalmic Medications: tears twice a day (nursing home forgets), vitamins for vision    Allergies & Intolerances:   Peaches (Rash)    Review of Systems:  Constitutional: No fever, chills  Eyes: No  flashes, new floaters, FBS, erythema, discharge, double vision, OU  Neuro: No tremors  Cardiovascular: No chest pain, palpitations  Respiratory: No SOB, no cough  GI: No nausea, vomiting, abdominal pain  : No dysuria  Skin: no rash  Psych: no depression  Endocrine: no polyuria, polydipsia  Heme/lymph: no swelling    VITALS: T(C): 36.8 (07-15-20 @ 19:30)  T(F): 98.2 (07-15-20 @ 19:30), Max: 98.4 (07-15-20 @ 18:20)  HR: 68 (07-15-20 @ 22:27) (68 - 74)  BP: 184/89 (07-15-20 @ 22:27) (160/80 - 184/89)  RR:  (18 - 20)  SpO2:  (94% - 96%)  Wt(kg): --  General: AAO x 3, appropriate mood and affect    Ophthalmology Exam:  Visual acuity (cc at near): 20/25 OD 20/40 OS  Pupils: PERRL OU, no APD  Ttono: 12 OU  Extraocular movements (EOMs): Full OU, no pain, no diplopia  Confrontational Visual Field (CVF): Full OU  Color Plates: 12/12 OU    Pen Light Exam (PLE)  External: Flat OU  Lids/Lashes/Lacrimal Ducts: Flat OU    Sclera/Conjunctiva: W+Q OU  Cornea: 3+ spk OU  Anterior Chamber: D+F OU    Iris: Flat OU  Lens: PCIOL OU    Fundus Exam: dilated with 1% tropicamide and 2.5% phenylephrine  Approval obtained from primary team for dilation  Patient aware that pupils can remained dilated for at least 4-6 hours  Exam performed with 20D lens    Vitreous: wnl OU  Disc, cup/disc: sharp and pink, 0.4 OU  Macula:  small area of RPE atrophy and RPE changes OD, wnl OS   Vessels:  wnl OU  Periphery: wnl OU      Labs/Imaging:  < from: CT Head No Cont (07.15.20 @ 18:35) >    EXAM:  CT BRAIN                            PROCEDURE DATE:  07/15/2020            INTERPRETATION:  CLINICAL INFORMATION: Left eye burning, left eye blurry vision    TECHNIQUE: Noncontrast axial CT images were acquired through the head. Two-dimensional sagittal and coronal reformats were generated.    COMPARISON STUDY: CT 6/24/2019, MRI brain  6/24/2019.    FINDINGS:     Unchanged enlargement of the sulci greater than expected for age present with volume loss.,. 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 Redemonstration, unchanged punctate calcifications correlate with any prior infectious/inflammatory change. No new acute intra or extra axial hemorrhage or midline shift. Basal cisterns remain patent.    Absent orbital lenses with cataract surgery. Calvarium demonstrateslucency and sclerosis without interval change from prior. The visualized paranasal sinuses, mastoid air cells and middle ear cavities are clear.    The soft tissues of the scalp are unremarkable. The calvarium is intact.    IMPRESSION:     Redemonstration, volume loss progressive microvascular disease, age indeterminate lacunar infarcts, no hemorrhage or midline shift. If symptoms persist consider follow-up head CT or MRI if no contraindications.                RUBÉN KO M.D., RADIOLOGY RESIDENT  This document has been electronically signed.  SARAH LYNN M.D., ATTENDING RADIOLOGIST  This document has been electronically signed. Jul 15 2020  7:11PM                < end of copied text >

## 2020-07-15 NOTE — ED ADULT NURSE NOTE - CAS EDN DISCHARGE ASSESSMENT
A&Ox2 (Person Place), Pt transferred back to facility on stretcher with non-emergent ambulance/Patient baseline mental status

## 2020-07-15 NOTE — ED ADULT TRIAGE NOTE - CCCP TRG CHIEF CMPLNT
blurry vision left eye/ from [facility] (covid unit) was covid + May 16, retested last week, no results available.

## 2020-07-15 NOTE — ED ADULT NURSE REASSESSMENT NOTE - NS ED NURSE REASSESS COMMENT FT1
Report received from Jennifer HIDALGO. Pt A&Ox2 (person and place) calm and cooperative, states her burning and blurry vision has gotten slightly better. Pt awaiting Opthalmology consult. Denies headache chest pain, sob, n/v/d, abdominal pain, f/c, urinary symptoms, hematuria

## 2020-07-15 NOTE — ED PROVIDER NOTE - PMH
Atrial fibrillation, unspecified type  patient on eliquis  Gastrointestinal bleed    High cholesterol    Hypertension    Pleural effusion on left    Polio    Splenic abscess

## 2020-07-15 NOTE — ED ADULT NURSE REASSESSMENT NOTE - NS ED NURSE REASSESS COMMENT FT1
As per Ayo VILLALOBOS, Pt cleared for discharge, Pt calm and cooperative awaiting non emergent ambulance to transfer back to [facility].

## 2020-07-16 LAB — ERYTHROCYTE [SEDIMENTATION RATE] IN BLOOD: 48 MM/HR — HIGH (ref 0–20)

## 2020-07-16 PROCEDURE — 80053 COMPREHEN METABOLIC PANEL: CPT

## 2020-07-16 PROCEDURE — 93005 ELECTROCARDIOGRAM TRACING: CPT

## 2020-07-16 PROCEDURE — 86140 C-REACTIVE PROTEIN: CPT

## 2020-07-16 PROCEDURE — 85610 PROTHROMBIN TIME: CPT

## 2020-07-16 PROCEDURE — 82962 GLUCOSE BLOOD TEST: CPT

## 2020-07-16 PROCEDURE — 85652 RBC SED RATE AUTOMATED: CPT

## 2020-07-16 PROCEDURE — 99284 EMERGENCY DEPT VISIT MOD MDM: CPT | Mod: 25

## 2020-07-16 PROCEDURE — 85027 COMPLETE CBC AUTOMATED: CPT

## 2020-07-16 PROCEDURE — 70450 CT HEAD/BRAIN W/O DYE: CPT

## 2020-07-16 PROCEDURE — 85730 THROMBOPLASTIN TIME PARTIAL: CPT

## 2020-07-16 RX ADMIN — Medication 650 MILLIGRAM(S): at 00:08

## 2020-07-16 NOTE — ED POST DISCHARGE NOTE - ADDITIONAL DOCUMENTATION
7/16: Spoke with opthalmology resident Christa. States that based on patient's gender/age ESR within normal range. Per [employer]odate, "One can roughly correct ESR for age by using the following formulas: the upper limit of the reference range equals (age in years)/2 for men and (age in years + 10)/2 for women." Patient given opthalmology follow up at discharge. Per chart review, plan discussed with patient's daughter. -Leticia Barton PA-C

## 2020-09-18 NOTE — PROGRESS NOTE ADULT - SUBJECTIVE AND OBJECTIVE BOX
Pre-Endoscopy Evaluation      Referring Physician:                                    Procedure:    Indication for Procedure:    Pertinent History:    Sedation by Anesthesia [ ]    PAST MEDICAL & SURGICAL HISTORY:  Pleural effusion on left  Splenic abscess  Polio  High cholesterol  Hypertension  History of appendectomy      PMH of Gastroparesis [ ]  Gastric Surgery [ ]  Gastric Outlet Obstruction [ ]    Allergies    No Known Drug Allergies  Peaches (Rash)    Intolerances        Latex allergy: [ ] yes [ ] no    Medications:MEDICATIONS  (STANDING):  acetaminophen   Tablet .. 975 milliGRAM(s) Oral every 8 hours  amLODIPine   Tablet 5 milliGRAM(s) Oral daily  aspirin 325 milliGRAM(s) Oral daily  docusate sodium 100 milliGRAM(s) Oral three times a day  heparin  Infusion 950 Unit(s)/Hr (9.5 mL/Hr) IV Continuous <Continuous>  lidocaine   Patch 1 Patch Transdermal every 24 hours  losartan 100 milliGRAM(s) Oral daily  oxybutynin 5 milliGRAM(s) Oral daily  pantoprazole    Tablet 40 milliGRAM(s) Oral two times a day  polyethylene glycol 3350 17 Gram(s) Oral daily  senna 2 Tablet(s) Oral at bedtime  simvastatin 20 milliGRAM(s) Oral at bedtime  zinc oxide 20% Ointment 1 Application(s) Topical two times a day    MEDICATIONS  (PRN):  bisacodyl Suppository 10 milliGRAM(s) Rectal daily PRN If no bowel movement  magnesium hydroxide Suspension 30 milliLiter(s) Oral daily PRN Constipation  ondansetron Injectable 4 milliGRAM(s) IV Push every 6 hours PRN Nausea and/or Vomiting      Smoking: [ ] yes  [ ] no    AICD/PPM: [ ] yes   [ ] no    Pertinent lab data:                        7.4    7.1   )-----------( 350      ( 2019 05:57 )             21.0     07-    144  |  111<H>  |  52<H>  ----------------------------<  106<H>  4.2   |  20<L>  |  1.57<H>    Ca    9.3      2019 05:57      PT/INR - ( 2019 14:51 )   PT: 22.6 sec;   INR: 1.93 ratio         PTT - ( 2019 05:57 )  PTT:62.4 sec              Physical Examination:  Daily     Daily Weight in k.4 (2019 07:16)  Vital Signs Last 24 Hrs  T(C): 36.4 (2019 11:32), Max: 36.9 (2019 21:14)  T(F): 97.6 (2019 11:32), Max: 98.5 (2019 21:14)  HR: 72 (2019 11:32) (72 - 80)  BP: 115/65 (2019 11:32) (109/61 - 117/64)  BP(mean): --  RR: 17 (2019 11:32) (17 - 18)  SpO2: 100% (2019 11:32) (98% - 100%)    Drug Dosing Weight  Height (cm): 165.1 (2019 18:17)  Weight (kg): 83 (2019 18:17)  BMI (kg/m2): 30.4 (2019 18:17)  BSA (m2): 1.9 (2019 18:17)    Constitutional: NAD    HEENT: PERRLA, EOMI,       Neck:  No JVD    Respiratory: CTAB/L    Cardiovascular: S1 and S2    Gastrointestinal: BS+, soft, NT/ND    Extremities: No peripheral edema    Neurological: A/O x 3, no focal deficits    Psychiatric: Normal mood, normal affect    : No Snyder    Skin: No rashes    Comments:    ASA Class: I [ ]  II [ ]  III [ ]  IV [ ]    The patient is a suitable candidate for the planned procedure unless box checked [ ]  No, explain: Pre-Endoscopy Evaluation      Referring Physician:  dr. kavin morgan                                 Procedure: egd    Indication for Procedure: guiac positive anemia    Pertinent History: 89y female with PMH below including HTN, HLD, Open reduction and internal fixation of hip and cerebrovascular accident due to carotid stenosis. found to have guaiac positive stool    Sedation by Anesthesia [X]    PAST MEDICAL & SURGICAL HISTORY:  Pleural effusion on left  Splenic abscess  Polio  High cholesterol  Hypertension  History of appendectomy      PMH of Gastroparesis [ ]  Gastric Surgery [ ]  Gastric Outlet Obstruction [ ]    Allergies    No Known Drug Allergies  Peaches (Rash)    Intolerances:    Latex allergy: [ ] yes [x] no    Medications:MEDICATIONS  (STANDING):  acetaminophen   Tablet .. 975 milliGRAM(s) Oral every 8 hours  amLODIPine   Tablet 5 milliGRAM(s) Oral daily  aspirin 325 milliGRAM(s) Oral daily  docusate sodium 100 milliGRAM(s) Oral three times a day  heparin  Infusion 950 Unit(s)/Hr (9.5 mL/Hr) IV Continuous <Continuous>  lidocaine   Patch 1 Patch Transdermal every 24 hours  losartan 100 milliGRAM(s) Oral daily  oxybutynin 5 milliGRAM(s) Oral daily  pantoprazole    Tablet 40 milliGRAM(s) Oral two times a day  polyethylene glycol 3350 17 Gram(s) Oral daily  senna 2 Tablet(s) Oral at bedtime  simvastatin 20 milliGRAM(s) Oral at bedtime  zinc oxide 20% Ointment 1 Application(s) Topical two times a day    MEDICATIONS  (PRN):  bisacodyl Suppository 10 milliGRAM(s) Rectal daily PRN If no bowel movement  magnesium hydroxide Suspension 30 milliLiter(s) Oral daily PRN Constipation  ondansetron Injectable 4 milliGRAM(s) IV Push every 6 hours PRN Nausea and/or Vomiting      Smoking: [ ] yes  [x] no    AICD/PPM: [ ] yes   [x] no    Pertinent lab data:                        7.4    7.1   )-----------( 350      ( 2019 05:57 )             21.0     07-    144  |  111<H>  |  52<H>  ----------------------------<  106<H>  4.2   |  20<L>  |  1.57<H>    Ca    9.3      2019 05:57      PT/INR - ( 2019 14:51 )   PT: 22.6 sec;   INR: 1.93 ratio         PTT - ( 2019 05:57 )  PTT:62.4 sec    < from: Transthoracic Echocardiogram (19 @ 15:20) >  EF (Visual Estimate): 70 %  Doppler Peak Velocity (m/sec): AoV=2.3  ------------------------------------------------------------------------  Observations:  Mitral Valve: Mitral annular calcification. Mild mitral  valve thickening.  Minimal mitral regurgitation.  Aortic Valve/Aorta: Fibrocalcific aortic valve without  stenosis.  Normal aortic root size. (Ao: 3.0 cm at the sinuses of  Valsalva).  Left Atrium: Normal left atrium.  LA volume index = 33  cc/m2.  Left Ventricle: Normal left ventricular systolic function.  No segmental wall motion abnormalities. Normal left  ventricular internal dimensions and wall thicknesses.  Right Heart: Normal right atrium. Normal right ventricular  size and function. Normal tricuspid valve. Mild tricuspid  regurgitation. Normal pulmonic valve.  Pericardium/Pleura: Normal pericardium with no pericardial  effusion.  Hemodynamic: Estimated right atrial pressure is normal.  Pulmonary artery pressure is normal.  Agitated saline injection demonstrates no evidence of a  patent foramen ovale.  ------------------------------------------------------------------------  Conclusions:  Normal left ventricular systolic function. No segmental  wall motion abnormalities.  Agitated saline injection demonstrates no evidence of a  patent foramen ovale.  ----------------------------------------------------------------------------------            Physical Examination:  Daily     Daily Weight in k.4 (2019 07:16)  Vital Signs Last 24 Hrs  T(C): 36.4 (2019 11:32), Max: 36.9 (2019 21:14)  T(F): 97.6 (2019 11:32), Max: 98.5 (2019 21:14)  HR: 72 (:) (72 - 80)  BP: 115/65 (2019 11:32) (109/61 - 117/64)  BP(mean): --  RR: 17 (2019 11:32) (17 - 18)  SpO2: 100% (:32) (98% - 100%)    Drug Dosing Weight  Height (cm): 165.1 (2019 18:17)  Weight (kg): 83 (2019 18:17)  BMI (kg/m2): 30.4 (2019 18:17)  BSA (m2): 1.9 (2019 18:17)    Constitutional: NAD     Neck:  No JVD    Respiratory: CTAB/L    Cardiovascular: S1 and S2    Gastrointestinal: BS+, soft, NT/ND    Extremities: No peripheral edema    Neurological: A/O x 3    : No Snyder    Skin: No rashes    Comments:    ASA Class: I [ ]  II [ ]  III [ ]  IV [x]    The patient is a suitable candidate for the planned procedure unless box checked [ ]  No, explain: Fall with Harm Risk

## 2021-09-23 NOTE — DISCHARGE NOTE PROVIDER - REASON FOR ADMISSION
5212 - called Ranjit per consult  Re: seizure  [address] MD called back to speak with SEVEN Gabriel  09/23/21 [id number]
Bed: 04  Expected date:   Expected time:   Means of arrival:   Comments:  MEDIC 15 (8 month old seizure)      Tere Bowser, NATALIA  09/23/21 [id number]
Bloody bowel movements

## 2021-12-29 NOTE — PHYSICAL THERAPY INITIAL EVALUATION ADULT - LEVEL OF CONSCIOUSNESS, REHAB EVAL
[FreeTextEntry1] : \par Patient compliant to CPAP therapy and having positive clinical response to treatment. Continue present settings.\par \par cont Flonase\par trial Allegra in addition\par r/t 6 months\par Follow-up sooner on a as needed basis.\par 
alert

## 2023-03-07 NOTE — ED PROVIDER NOTE - OBJECTIVE STATEMENT
Take the ceftin as ordered until completed  Eat yogurt or take a probiotic to restore good bacteria to your gut; this helps prevent stomach irritation/diarrhea while on an antibiotic  After 24 hours, symptoms should not get worse  It may take a few days to start to see improvement  If symptoms are worsening after 24 hours or if you are not seeing improvement, you should be seen again  Eunice max dose of ibuprofen is 272 mg--she may take up to 1 full adult tab (200 mg)  Her max dose of tylenol/acetaminophen is 408 mg--so she could take one 325 mg tab but not a 500 mg tab  Cellulitis in Children   AMBULATORY CARE:   Cellulitis  is a skin infection caused by bacteria  Cellulitis is common and can become severe  Cellulitis usually appears on your child's lower legs  It can also appear on his or her arms, face, and other areas  Cellulitis develops when bacteria enter a crack or break in your child's skin, such as a scratch, bite, or cut  Common signs and symptoms:  Signs and symptoms usually appear on one side of your child's body  Your child may have any of the following:  A fever    A red, warm, swollen area on your child's skin    Pain when the area is touched    Red spots, bumps, or blisters that may drain pus    Bumpy, raised skin that feels like an orange peel    Seek care immediately if:   Your child's wound gets larger and more painful  You feel a crackling under your child's skin when you touch it  Your child has purple dots or bumps on his or her skin  You see red streaks coming from your child's infected area  Call your child's doctor if:   The red, warm, swollen area gets larger  Your child's fever or pain does not go away or gets worse  The area does not get smaller after 3 days of antibiotics  You have questions or concerns about your child's condition or care  Treatment:  You should start to see improvement in your child's symptoms in 3 days   If your child's cellulitis is severe, he or she may need IV antibiotics in the hospital  If cellulitis is not treated, the infection can spread through your child's body and become life-threatening  Your child may need any of the following medicines:  Antibiotics  help treat the bacterial infection  Acetaminophen  decreases pain and fever  It is available without a doctor's order  Ask how much to give your child and how often to give it  Follow directions  Read the labels of all other medicines your child uses to see if they also contain acetaminophen, or ask your child's doctor or pharmacist  Acetaminophen can cause liver damage if not taken correctly  NSAIDs , such as ibuprofen, help decrease swelling, pain, and fever  This medicine is available with or without a doctor's order  NSAIDs can cause stomach bleeding or kidney problems in certain people  If your child takes blood thinner medicine, always ask if NSAIDs are safe for him or her  Always read the medicine label and follow directions  Do not give these medicines to children under 10months of age without direction from your child's healthcare provider  Do not give aspirin to children under 25years of age  Your child could develop Reye syndrome if he takes aspirin  Reye syndrome can cause life-threatening brain and liver damage  Check your child's medicine labels for aspirin, salicylates, or oil of wintergreen  Give your child's medicine as directed  Contact your child's healthcare provider if you think the medicine is not working as expected  Tell him or her if your child is allergic to any medicine  Keep a current list of the medicines, vitamins, and herbs your child takes  Include the amounts, and when, how, and why they are taken  Bring the list or the medicines in their containers to follow-up visits  Carry your child's medicine list with you in case of an emergency      Manage your child's symptoms:   Help your child wash the area with soap and water every day  Gently pat dry  Use bandages if directed by your child's healthcare provider  Elevate (raise) the area above the level of your child's heart  as often as you can  This will help decrease swelling and pain  Prop the area on pillows or blankets to keep it elevated comfortably  Place a cool, damp cloth on the area  Use clean cloths and clean water  Cool, damp cloths may help decrease pain  Help your child apply cream or ointment as directed  These help protect the area  Most over-the-counter products, such as petroleum jelly, are good to use  Ask your child's healthcare provider about specific creams or ointments to use  Prevent cellulitis:   Remind your child to not scratch bug bites or areas of injury  Your child increases his or her risk for cellulitis by scratching these areas  Do not let your child share personal items, such as towels, clothing, and razors  Treat athlete's foot or any other skin condition  This can help prevent the spread of a bacterial skin infection  Have your child wear protective gear during sports  Some examples include knee or elbow pads, and a helmet  Have your child wash his or her hands often  Make sure he or she washes with soap and water after using the bathroom or sneezing  He or she also needs to wash his or her hands before eating  Use lotion to prevent dry, cracked skin  Follow up with your child's doctor within 3 days or as directed:  He or she will check if your child's cellulitis is getting better  Write down your questions so you remember to ask them during your child's visits  © Copyright [employer] 2022 Information is for End User's use only and may not be sold, redistributed or otherwise used for commercial purposes  All illustrations and images included in CareNotes® are the copyrighted property of [employer]  or [employer]ín [employer]  The above information is an  only   It is not intended as medical advice for individual conditions or treatments  Talk to your doctor, nurse or pharmacist before following any medical regimen to see if it is safe and effective for you  89 yo F with a relevant history of HTN and afib, on eliquis, presenting with constant unilateral blurry vision in her left eye since Sunday, 3 days ago. Along with the vision changes, she reports a "burning pain" in the left eye. She denies any other complaints such as chest pain, headache, shortness of breath, focal weakness, change in mental status, palpitations, abdominal pain, eye discharge, eye redness or any other complaints. Trilobed Flap Text: The defect edges were debeveled with a #15 scalpel blade.  Given the location of the defect and the proximity to free margins a trilobed flap was deemed most appropriate.  Using a sterile surgical marker, an appropriate trilobed flap drawn around the defect.    The area thus outlined was incised deep to adipose tissue with a #15 scalpel blade.  The skin margins were undermined to an appropriate distance in all directions utilizing iris scissors.

## 2023-05-04 NOTE — PROVIDER CONTACT NOTE (OTHER) - ASSESSMENT
pt AO4 no complaints of pain or discomfort, no s/s dizziness, SOB, CP at this time  pt history of bradycardia into 40s on tele, asymptomatic Drysol Counseling:  I discussed with the patient the risks of drysol/aluminum chloride including but not limited to skin rash, itching, irritation, burning.

## 2023-09-20 NOTE — PATIENT PROFILE ADULT - NSPROEXTENSIONSOFSELF_GEN_A_NUR
eyeglasses/dentures Area H Indication Text: Tumors in this location are included in Area H (eyelids, eyebrows, nose, lips, chin, ear, pre-auricular, post-auricular, temple, genitalia, hands, feet, ankles and areola).  Tissue conservation is critical in these anatomic locations.

## 2023-10-22 NOTE — PACU DISCHARGE NOTE - PAIN:
cardiac disease that she is aware of. Initial vital signs are reassuring with evidence of fever or tachycardia. She is saturating 100% on room air. She is nontoxic-appearing on exam but does appear anxious with some tachypnea. She has slightly diminished breath sounds bilaterally without definitive wheezing, rhonchi, or rales. At this time I doubt pulmonary embolism as she has low risk Wells score and is PERC negative. We will obtain EKG, troponin, CBC, BMP, chest x-ray, VBG, beta hydroxybutyrate testing, ethanol level, urine drug screen. In the interim we will provide her with DuoNeb therapy given the diminished breath sounds on exam although this may be secondary to some splinting from her tachypnea. CBC is without leukocytosis. BMP is reassuring is without hyperglycemia. EKG is without acute ischemic changes and troponin is nonelevated. VBG is reassuring with evidence of hypercapnia or hypoxia with pH of 7.39. Hydroxybutyrate is nonelevated. BNP is within normal limits. Ethanol is nonelevated. Chest x-ray shows evidence of some cardiac silhouette enlargement although no evidence of pleural effusions. There is also areas of interstitial changes suggestive of interstitial pneumonitis. On reassessment after patient received DuoNeb therapy she has improved aeration. She is without oxygen saturation while here and I ambulated the patient myself in the hallway with continuous pulse oximetry and she was without oxygen desaturation or tachycardia with ambulation. At this time I had further discussion with the patient and it seems she was actually using several strong chemical  this evening while cleaning the house which she typically does not use on a regular basis and we discussed that these fumes certainly could have contributed to her experiencing her shortness of breath with chest tightness and chest x-ray evidence of pneumonitis.   We discussed that her chest x-ray shows possible Controlled with current regime

## 2023-10-25 NOTE — PATIENT PROFILE ADULT - DO YOU FEEL UNSAFE AT SCHOOL?
Patient requested to meet with the Supervising Psychologist; writer agreed. As patient looked around the room suspiciously before eventually agreeing to speak with the writer near the nurses station, patient spoke of her recent interaction with her attending psychiatrist. In hushed tones, she claimed that the "white doctor told me that all the staff thinks she is crazy," and proceeded to get upset. Patient vented her frustrations as the writer provided a safe space for her. Writer then countered this claim and asked the patient to accurately report what her doctor actually said. Patient eventually admitted that the doctor called her, "sick" but then disagreed with this statement. Writer attempted to address this misperception but the patient continuously interrupted the writer. Writer explained he would not be able to continue this interaction unless the patient was willing to listen; however, the patient was unable to allow the writer to speak and thus, the meeting was ended. no

## 2024-01-01 NOTE — DISCHARGE NOTE PROVIDER - CARE PROVIDERS DIRECT ADDRESSES
,DirectAddress_Unknown ,DirectAddress_Unknown,[email or web address]
